# Patient Record
Sex: FEMALE | Race: WHITE | NOT HISPANIC OR LATINO | Employment: OTHER | URBAN - NONMETROPOLITAN AREA
[De-identification: names, ages, dates, MRNs, and addresses within clinical notes are randomized per-mention and may not be internally consistent; named-entity substitution may affect disease eponyms.]

---

## 2018-02-05 ENCOUNTER — DOCUMENTATION ONLY (OUTPATIENT)
Dept: FAMILY MEDICINE | Facility: OTHER | Age: 73
End: 2018-02-05

## 2018-02-05 PROBLEM — J45.909 REACTIVE AIRWAY DISEASE: Status: ACTIVE | Noted: 2018-02-05

## 2018-02-05 PROBLEM — I10 HYPERTENSION: Status: ACTIVE | Noted: 2018-02-05

## 2018-02-05 PROBLEM — N60.19 FIBROCYSTIC BREAST DISEASE: Status: ACTIVE | Noted: 2018-02-05

## 2018-02-05 RX ORDER — LISINOPRIL 40 MG/1
40 TABLET ORAL DAILY
Status: ON HOLD | COMMUNITY
Start: 2015-11-05 | End: 2024-06-25

## 2018-02-05 RX ORDER — ALBUTEROL SULFATE 90 UG/1
2 AEROSOL, METERED RESPIRATORY (INHALATION) 4 TIMES DAILY PRN
COMMUNITY
Start: 2015-01-14 | End: 2024-06-18

## 2018-02-05 RX ORDER — METOPROLOL TARTRATE 50 MG
50 TABLET ORAL 2 TIMES DAILY
COMMUNITY
Start: 2016-02-01 | End: 2024-06-18

## 2018-02-05 RX ORDER — HYDROCHLOROTHIAZIDE 25 MG/1
25 TABLET ORAL DAILY
COMMUNITY
Start: 2015-11-05 | End: 2024-06-18

## 2018-02-05 RX ORDER — SCOLOPAMINE TRANSDERMAL SYSTEM 1 MG/1
PATCH, EXTENDED RELEASE TRANSDERMAL
COMMUNITY
Start: 2016-05-03 | End: 2024-06-18

## 2018-02-05 RX ORDER — BACLOFEN 10 MG/1
10 TABLET ORAL EVERY EVENING
COMMUNITY
Start: 2014-10-29 | End: 2024-07-02

## 2018-03-19 ENCOUNTER — HOSPITAL ENCOUNTER (OUTPATIENT)
Dept: MAMMOGRAPHY | Facility: OTHER | Age: 73
Discharge: HOME | End: 2018-03-19
Attending: FAMILY MEDICINE | Admitting: FAMILY MEDICINE
Payer: MEDICARE

## 2018-03-19 DIAGNOSIS — Z12.31 VISIT FOR SCREENING MAMMOGRAM: ICD-10-CM

## 2018-03-19 PROCEDURE — 77067 SCR MAMMO BI INCL CAD: CPT

## 2023-06-07 ENCOUNTER — TRANSFERRED RECORDS (OUTPATIENT)
Dept: MULTI SPECIALTY CLINIC | Facility: CLINIC | Age: 78
End: 2023-06-07

## 2024-06-18 ENCOUNTER — HOSPITAL ENCOUNTER (INPATIENT)
Facility: OTHER | Age: 79
LOS: 7 days | Discharge: HOME | DRG: 871 | End: 2024-06-25
Attending: STUDENT IN AN ORGANIZED HEALTH CARE EDUCATION/TRAINING PROGRAM | Admitting: INTERNAL MEDICINE
Payer: MEDICARE

## 2024-06-18 ENCOUNTER — APPOINTMENT (OUTPATIENT)
Dept: CARDIOLOGY | Facility: OTHER | Age: 79
DRG: 871 | End: 2024-06-18
Attending: FAMILY MEDICINE
Payer: MEDICARE

## 2024-06-18 ENCOUNTER — APPOINTMENT (OUTPATIENT)
Dept: GENERAL RADIOLOGY | Facility: OTHER | Age: 79
DRG: 871 | End: 2024-06-18
Attending: STUDENT IN AN ORGANIZED HEALTH CARE EDUCATION/TRAINING PROGRAM
Payer: MEDICARE

## 2024-06-18 DIAGNOSIS — A41.9 SEPSIS WITHOUT ACUTE ORGAN DYSFUNCTION, DUE TO UNSPECIFIED ORGANISM (H): ICD-10-CM

## 2024-06-18 DIAGNOSIS — I63.9 ACUTE CVA (CEREBROVASCULAR ACCIDENT) (H): ICD-10-CM

## 2024-06-18 DIAGNOSIS — B34.9 ACUTE VIRAL SYNDROME: ICD-10-CM

## 2024-06-18 DIAGNOSIS — I48.91 ATRIAL FIBRILLATION WITH RAPID VENTRICULAR RESPONSE (H): ICD-10-CM

## 2024-06-18 DIAGNOSIS — N10 ACUTE PYELONEPHRITIS: ICD-10-CM

## 2024-06-18 DIAGNOSIS — Z20.822 LAB TEST NEGATIVE FOR COVID-19 VIRUS: Primary | ICD-10-CM

## 2024-06-18 LAB
ACANTHOCYTES BLD QL SMEAR: ABNORMAL
ALBUMIN SERPL BCG-MCNC: 3.3 G/DL (ref 3.5–5.2)
ALBUMIN SERPL BCG-MCNC: 4 G/DL (ref 3.5–5.2)
ALBUMIN UR-MCNC: 100 MG/DL
ALP SERPL-CCNC: 111 U/L (ref 40–150)
ALP SERPL-CCNC: 147 U/L (ref 40–150)
ALT SERPL W P-5'-P-CCNC: 56 U/L (ref 0–50)
ALT SERPL W P-5'-P-CCNC: 81 U/L (ref 0–50)
ANION GAP SERPL CALCULATED.3IONS-SCNC: 10 MMOL/L (ref 7–15)
ANION GAP SERPL CALCULATED.3IONS-SCNC: 13 MMOL/L (ref 7–15)
APPEARANCE UR: ABNORMAL
AST SERPL W P-5'-P-CCNC: 106 U/L (ref 0–45)
AST SERPL W P-5'-P-CCNC: 66 U/L (ref 0–45)
ATRIAL RATE - MUSE: 174 BPM
ATRIAL RATE - MUSE: 174 BPM
AUER BODIES BLD QL SMEAR: ABNORMAL
B BURGDOR IGG+IGM SER QL: 0.07
BASO STIPL BLD QL SMEAR: ABNORMAL
BASOPHILS # BLD AUTO: 0 10E3/UL (ref 0–0.2)
BASOPHILS NFR BLD AUTO: 1 %
BILIRUB DIRECT SERPL-MCNC: 0.23 MG/DL (ref 0–0.3)
BILIRUB SERPL-MCNC: 0.4 MG/DL
BILIRUB SERPL-MCNC: 0.8 MG/DL
BILIRUB UR QL STRIP: NEGATIVE
BITE CELLS BLD QL SMEAR: ABNORMAL
BLISTER CELLS BLD QL SMEAR: ABNORMAL
BUN SERPL-MCNC: 10.4 MG/DL (ref 8–23)
BUN SERPL-MCNC: 9 MG/DL (ref 8–23)
BURR CELLS BLD QL SMEAR: ABNORMAL
C PNEUM DNA SPEC QL NAA+PROBE: NOT DETECTED
CALCIUM SERPL-MCNC: 8.5 MG/DL (ref 8.8–10.2)
CALCIUM SERPL-MCNC: 9.4 MG/DL (ref 8.8–10.2)
CHLORIDE SERPL-SCNC: 101 MMOL/L (ref 98–107)
CHLORIDE SERPL-SCNC: 90 MMOL/L (ref 98–107)
COLOR UR AUTO: YELLOW
CREAT SERPL-MCNC: 0.55 MG/DL (ref 0.51–0.95)
CREAT SERPL-MCNC: 0.76 MG/DL (ref 0.51–0.95)
DACRYOCYTES BLD QL SMEAR: ABNORMAL
DEPRECATED HCO3 PLAS-SCNC: 24 MMOL/L (ref 22–29)
DEPRECATED HCO3 PLAS-SCNC: 28 MMOL/L (ref 22–29)
DIASTOLIC BLOOD PRESSURE - MUSE: NORMAL MMHG
DIASTOLIC BLOOD PRESSURE - MUSE: NORMAL MMHG
EGFRCR SERPLBLD CKD-EPI 2021: 81 ML/MIN/1.73M2
EGFRCR SERPLBLD CKD-EPI 2021: >90 ML/MIN/1.73M2
ELLIPTOCYTES BLD QL SMEAR: ABNORMAL
EOSINOPHIL # BLD AUTO: 0 10E3/UL (ref 0–0.7)
EOSINOPHIL NFR BLD AUTO: 0 %
ERYTHROCYTE [DISTWIDTH] IN BLOOD BY AUTOMATED COUNT: 11.7 % (ref 10–15)
ERYTHROCYTE [DISTWIDTH] IN BLOOD BY AUTOMATED COUNT: 11.8 % (ref 10–15)
FLUAV H1 2009 PAND RNA SPEC QL NAA+PROBE: NOT DETECTED
FLUAV H1 RNA SPEC QL NAA+PROBE: NOT DETECTED
FLUAV H3 RNA SPEC QL NAA+PROBE: NOT DETECTED
FLUAV RNA SPEC QL NAA+PROBE: NEGATIVE
FLUAV RNA SPEC QL NAA+PROBE: NOT DETECTED
FLUBV RNA RESP QL NAA+PROBE: NEGATIVE
FLUBV RNA SPEC QL NAA+PROBE: NOT DETECTED
FRAGMENTS BLD QL SMEAR: ABNORMAL
GIANT PLATELETS BLD QL SMEAR: ABNORMAL
GLUCOSE BLDC GLUCOMTR-MCNC: 129 MG/DL (ref 70–99)
GLUCOSE SERPL-MCNC: 121 MG/DL (ref 70–99)
GLUCOSE SERPL-MCNC: 163 MG/DL (ref 70–99)
GLUCOSE UR STRIP-MCNC: NEGATIVE MG/DL
HADV DNA SPEC QL NAA+PROBE: NOT DETECTED
HCOV PNL SPEC NAA+PROBE: NOT DETECTED
HCT VFR BLD AUTO: 39.8 % (ref 35–47)
HCT VFR BLD AUTO: 43.8 % (ref 35–47)
HGB BLD-MCNC: 13.9 G/DL (ref 11.7–15.7)
HGB BLD-MCNC: 15.5 G/DL (ref 11.7–15.7)
HGB C CRYSTALS: ABNORMAL
HGB UR QL STRIP: ABNORMAL
HMPV RNA SPEC QL NAA+PROBE: NOT DETECTED
HOLD SPECIMEN: NORMAL
HOWELL-JOLLY BOD BLD QL SMEAR: ABNORMAL
HPIV1 RNA SPEC QL NAA+PROBE: NOT DETECTED
HPIV2 RNA SPEC QL NAA+PROBE: NOT DETECTED
HPIV3 RNA SPEC QL NAA+PROBE: NOT DETECTED
HPIV4 RNA SPEC QL NAA+PROBE: NOT DETECTED
HYALINE CASTS: 42 /LPF
IMM GRANULOCYTES # BLD: 0 10E3/UL
IMM GRANULOCYTES NFR BLD: 0 %
INTERPRETATION ECG - MUSE: NORMAL
INTERPRETATION ECG - MUSE: NORMAL
KETONES UR STRIP-MCNC: 10 MG/DL
LACTATE SERPL-SCNC: 2 MMOL/L (ref 0.7–2)
LEUKOCYTE ESTERASE UR QL STRIP: ABNORMAL
LVEF ECHO: NORMAL
LYMPHOCYTES # BLD AUTO: 1.3 10E3/UL (ref 0.8–5.3)
LYMPHOCYTES NFR BLD AUTO: 28 %
M PNEUMO DNA SPEC QL NAA+PROBE: NOT DETECTED
MAGNESIUM SERPL-MCNC: 1.8 MG/DL (ref 1.7–2.3)
MCH RBC QN AUTO: 31.7 PG (ref 26.5–33)
MCH RBC QN AUTO: 32 PG (ref 26.5–33)
MCHC RBC AUTO-ENTMCNC: 34.9 G/DL (ref 31.5–36.5)
MCHC RBC AUTO-ENTMCNC: 35.4 G/DL (ref 31.5–36.5)
MCV RBC AUTO: 90 FL (ref 78–100)
MCV RBC AUTO: 92 FL (ref 78–100)
MONOCYTES # BLD AUTO: 0.5 10E3/UL (ref 0–1.3)
MONOCYTES NFR BLD AUTO: 11 %
MUCOUS THREADS #/AREA URNS LPF: PRESENT /LPF
NEUTROPHILS # BLD AUTO: 2.9 10E3/UL (ref 1.6–8.3)
NEUTROPHILS NFR BLD AUTO: 60 %
NEUTS HYPERSEG BLD QL SMEAR: ABNORMAL
NITRATE UR QL: NEGATIVE
NRBC # BLD AUTO: 0 10E3/UL
NRBC BLD AUTO-RTO: 0 /100
P AXIS - MUSE: NORMAL DEGREES
P AXIS - MUSE: NORMAL DEGREES
PATH REV: ABNORMAL
PH UR STRIP: 6 [PH] (ref 5–9)
PLAT MORPH BLD: ABNORMAL
PLATELET # BLD AUTO: 70 10E3/UL (ref 150–450)
PLATELET # BLD AUTO: 83 10E3/UL (ref 150–450)
POLYCHROMASIA BLD QL SMEAR: ABNORMAL
POTASSIUM SERPL-SCNC: 2.9 MMOL/L (ref 3.4–5.3)
POTASSIUM SERPL-SCNC: 3 MMOL/L (ref 3.4–5.3)
POTASSIUM SERPL-SCNC: 4.4 MMOL/L (ref 3.4–5.3)
PR INTERVAL - MUSE: NORMAL MS
PR INTERVAL - MUSE: NORMAL MS
PROCALCITONIN SERPL IA-MCNC: 0.52 NG/ML
PROT SERPL-MCNC: 6.2 G/DL (ref 6.4–8.3)
PROT SERPL-MCNC: 7.4 G/DL (ref 6.4–8.3)
QRS DURATION - MUSE: 84 MS
QRS DURATION - MUSE: 84 MS
QT - MUSE: 284 MS
QT - MUSE: 330 MS
QTC - MUSE: 419 MS
QTC - MUSE: 467 MS
R AXIS - MUSE: -12 DEGREES
R AXIS - MUSE: -14 DEGREES
RBC # BLD AUTO: 4.34 10E6/UL (ref 3.8–5.2)
RBC # BLD AUTO: 4.89 10E6/UL (ref 3.8–5.2)
RBC AGGLUT BLD QL: ABNORMAL
RBC MORPH BLD: ABNORMAL
RBC URINE: 9 /HPF
ROULEAUX BLD QL SMEAR: ABNORMAL
RSV RNA SPEC NAA+PROBE: NEGATIVE
RSV RNA SPEC QL NAA+PROBE: NOT DETECTED
RSV RNA SPEC QL NAA+PROBE: NOT DETECTED
RV+EV RNA SPEC QL NAA+PROBE: NOT DETECTED
SARS-COV-2 RNA RESP QL NAA+PROBE: NEGATIVE
SICKLE CELLS BLD QL SMEAR: ABNORMAL
SMUDGE CELLS BLD QL SMEAR: ABNORMAL
SODIUM SERPL-SCNC: 131 MMOL/L (ref 135–145)
SODIUM SERPL-SCNC: 135 MMOL/L (ref 135–145)
SP GR UR STRIP: 1.02 (ref 1–1.03)
SPHEROCYTES BLD QL SMEAR: ABNORMAL
SQUAMOUS EPITHELIAL: 1 /HPF
STOMATOCYTES BLD QL SMEAR: ABNORMAL
SYSTOLIC BLOOD PRESSURE - MUSE: NORMAL MMHG
SYSTOLIC BLOOD PRESSURE - MUSE: NORMAL MMHG
T AXIS - MUSE: -22 DEGREES
T AXIS - MUSE: 14 DEGREES
T4 FREE SERPL-MCNC: 1.24 NG/DL (ref 0.9–1.7)
TARGETS BLD QL SMEAR: ABNORMAL
TOXIC GRANULES BLD QL SMEAR: ABNORMAL
TRANSITIONAL EPI: 2 /HPF
TROPONIN T SERPL HS-MCNC: 23 NG/L
TROPONIN T SERPL HS-MCNC: 24 NG/L
TSH SERPL DL<=0.005 MIU/L-ACNC: 4.5 UIU/ML (ref 0.3–4.2)
UROBILINOGEN UR STRIP-MCNC: NORMAL MG/DL
VARIANT LYMPHS BLD QL SMEAR: PRESENT
VENTRICULAR RATE- MUSE: 163 BPM
VENTRICULAR RATE- MUSE: 97 BPM
WBC # BLD AUTO: 4 10E3/UL (ref 4–11)
WBC # BLD AUTO: 4.8 10E3/UL (ref 4–11)
WBC URINE: 52 /HPF

## 2024-06-18 PROCEDURE — 250N000011 HC RX IP 250 OP 636: Mod: JZ | Performed by: STUDENT IN AN ORGANIZED HEALTH CARE EDUCATION/TRAINING PROGRAM

## 2024-06-18 PROCEDURE — 84145 PROCALCITONIN (PCT): CPT | Performed by: FAMILY MEDICINE

## 2024-06-18 PROCEDURE — 82247 BILIRUBIN TOTAL: CPT | Performed by: FAMILY MEDICINE

## 2024-06-18 PROCEDURE — 80048 BASIC METABOLIC PNL TOTAL CA: CPT | Performed by: STUDENT IN AN ORGANIZED HEALTH CARE EDUCATION/TRAINING PROGRAM

## 2024-06-18 PROCEDURE — 250N000011 HC RX IP 250 OP 636: Mod: JZ | Performed by: FAMILY MEDICINE

## 2024-06-18 PROCEDURE — 258N000003 HC RX IP 258 OP 636: Mod: JZ | Performed by: STUDENT IN AN ORGANIZED HEALTH CARE EDUCATION/TRAINING PROGRAM

## 2024-06-18 PROCEDURE — 99222 1ST HOSP IP/OBS MODERATE 55: CPT | Mod: AI | Performed by: INTERNAL MEDICINE

## 2024-06-18 PROCEDURE — 86665 EPSTEIN-BARR CAPSID VCA: CPT | Performed by: FAMILY MEDICINE

## 2024-06-18 PROCEDURE — 84439 ASSAY OF FREE THYROXINE: CPT | Performed by: STUDENT IN AN ORGANIZED HEALTH CARE EDUCATION/TRAINING PROGRAM

## 2024-06-18 PROCEDURE — 96366 THER/PROPH/DIAG IV INF ADDON: CPT | Performed by: STUDENT IN AN ORGANIZED HEALTH CARE EDUCATION/TRAINING PROGRAM

## 2024-06-18 PROCEDURE — 82374 ASSAY BLOOD CARBON DIOXIDE: CPT | Performed by: FAMILY MEDICINE

## 2024-06-18 PROCEDURE — 99291 CRITICAL CARE FIRST HOUR: CPT | Mod: 25 | Performed by: STUDENT IN AN ORGANIZED HEALTH CARE EDUCATION/TRAINING PROGRAM

## 2024-06-18 PROCEDURE — 93005 ELECTROCARDIOGRAM TRACING: CPT | Mod: 76 | Performed by: STUDENT IN AN ORGANIZED HEALTH CARE EDUCATION/TRAINING PROGRAM

## 2024-06-18 PROCEDURE — 250N000013 HC RX MED GY IP 250 OP 250 PS 637: Performed by: FAMILY MEDICINE

## 2024-06-18 PROCEDURE — 93010 ELECTROCARDIOGRAM REPORT: CPT | Mod: 76 | Performed by: INTERNAL MEDICINE

## 2024-06-18 PROCEDURE — 84484 ASSAY OF TROPONIN QUANT: CPT | Performed by: STUDENT IN AN ORGANIZED HEALTH CARE EDUCATION/TRAINING PROGRAM

## 2024-06-18 PROCEDURE — 86666 EHRLICHIA ANTIBODY: CPT | Performed by: FAMILY MEDICINE

## 2024-06-18 PROCEDURE — 99207 PR NO CHARGE LOS: CPT | Performed by: FAMILY MEDICINE

## 2024-06-18 PROCEDURE — 93306 TTE W/DOPPLER COMPLETE: CPT

## 2024-06-18 PROCEDURE — 87637 SARSCOV2&INF A&B&RSV AMP PRB: CPT | Performed by: STUDENT IN AN ORGANIZED HEALTH CARE EDUCATION/TRAINING PROGRAM

## 2024-06-18 PROCEDURE — 36415 COLL VENOUS BLD VENIPUNCTURE: CPT | Performed by: FAMILY MEDICINE

## 2024-06-18 PROCEDURE — 86618 LYME DISEASE ANTIBODY: CPT | Performed by: STUDENT IN AN ORGANIZED HEALTH CARE EDUCATION/TRAINING PROGRAM

## 2024-06-18 PROCEDURE — 96376 TX/PRO/DX INJ SAME DRUG ADON: CPT | Mod: XS | Performed by: STUDENT IN AN ORGANIZED HEALTH CARE EDUCATION/TRAINING PROGRAM

## 2024-06-18 PROCEDURE — 87040 BLOOD CULTURE FOR BACTERIA: CPT | Performed by: FAMILY MEDICINE

## 2024-06-18 PROCEDURE — 36416 COLLJ CAPILLARY BLOOD SPEC: CPT | Performed by: FAMILY MEDICINE

## 2024-06-18 PROCEDURE — 85025 COMPLETE CBC W/AUTO DIFF WBC: CPT | Performed by: STUDENT IN AN ORGANIZED HEALTH CARE EDUCATION/TRAINING PROGRAM

## 2024-06-18 PROCEDURE — 93005 ELECTROCARDIOGRAM TRACING: CPT | Performed by: STUDENT IN AN ORGANIZED HEALTH CARE EDUCATION/TRAINING PROGRAM

## 2024-06-18 PROCEDURE — 99291 CRITICAL CARE FIRST HOUR: CPT | Performed by: STUDENT IN AN ORGANIZED HEALTH CARE EDUCATION/TRAINING PROGRAM

## 2024-06-18 PROCEDURE — 87086 URINE CULTURE/COLONY COUNT: CPT | Performed by: STUDENT IN AN ORGANIZED HEALTH CARE EDUCATION/TRAINING PROGRAM

## 2024-06-18 PROCEDURE — 84132 ASSAY OF SERUM POTASSIUM: CPT | Performed by: FAMILY MEDICINE

## 2024-06-18 PROCEDURE — 36415 COLL VENOUS BLD VENIPUNCTURE: CPT | Performed by: STUDENT IN AN ORGANIZED HEALTH CARE EDUCATION/TRAINING PROGRAM

## 2024-06-18 PROCEDURE — 87040 BLOOD CULTURE FOR BACTERIA: CPT | Performed by: STUDENT IN AN ORGANIZED HEALTH CARE EDUCATION/TRAINING PROGRAM

## 2024-06-18 PROCEDURE — 82248 BILIRUBIN DIRECT: CPT | Performed by: STUDENT IN AN ORGANIZED HEALTH CARE EDUCATION/TRAINING PROGRAM

## 2024-06-18 PROCEDURE — 96375 TX/PRO/DX INJ NEW DRUG ADDON: CPT | Mod: XS | Performed by: STUDENT IN AN ORGANIZED HEALTH CARE EDUCATION/TRAINING PROGRAM

## 2024-06-18 PROCEDURE — 200N000001 HC R&B ICU

## 2024-06-18 PROCEDURE — 84443 ASSAY THYROID STIM HORMONE: CPT | Performed by: STUDENT IN AN ORGANIZED HEALTH CARE EDUCATION/TRAINING PROGRAM

## 2024-06-18 PROCEDURE — 71045 X-RAY EXAM CHEST 1 VIEW: CPT | Mod: TC

## 2024-06-18 PROCEDURE — 86645 CMV ANTIBODY IGM: CPT | Performed by: FAMILY MEDICINE

## 2024-06-18 PROCEDURE — 36600 WITHDRAWAL OF ARTERIAL BLOOD: CPT | Performed by: FAMILY MEDICINE

## 2024-06-18 PROCEDURE — 93306 TTE W/DOPPLER COMPLETE: CPT | Mod: 26 | Performed by: STUDENT IN AN ORGANIZED HEALTH CARE EDUCATION/TRAINING PROGRAM

## 2024-06-18 PROCEDURE — 83735 ASSAY OF MAGNESIUM: CPT | Performed by: STUDENT IN AN ORGANIZED HEALTH CARE EDUCATION/TRAINING PROGRAM

## 2024-06-18 PROCEDURE — 85027 COMPLETE CBC AUTOMATED: CPT | Performed by: FAMILY MEDICINE

## 2024-06-18 PROCEDURE — 96365 THER/PROPH/DIAG IV INF INIT: CPT | Performed by: STUDENT IN AN ORGANIZED HEALTH CARE EDUCATION/TRAINING PROGRAM

## 2024-06-18 PROCEDURE — 87899 AGENT NOS ASSAY W/OPTIC: CPT | Performed by: FAMILY MEDICINE

## 2024-06-18 PROCEDURE — 96367 TX/PROPH/DG ADDL SEQ IV INF: CPT | Mod: XS | Performed by: STUDENT IN AN ORGANIZED HEALTH CARE EDUCATION/TRAINING PROGRAM

## 2024-06-18 PROCEDURE — 87633 RESP VIRUS 12-25 TARGETS: CPT | Performed by: FAMILY MEDICINE

## 2024-06-18 PROCEDURE — 250N000013 HC RX MED GY IP 250 OP 250 PS 637: Performed by: INTERNAL MEDICINE

## 2024-06-18 PROCEDURE — 999N000157 HC STATISTIC RCP TIME EA 10 MIN

## 2024-06-18 PROCEDURE — 81001 URINALYSIS AUTO W/SCOPE: CPT | Performed by: STUDENT IN AN ORGANIZED HEALTH CARE EDUCATION/TRAINING PROGRAM

## 2024-06-18 PROCEDURE — 83605 ASSAY OF LACTIC ACID: CPT | Performed by: STUDENT IN AN ORGANIZED HEALTH CARE EDUCATION/TRAINING PROGRAM

## 2024-06-18 RX ORDER — MAGNESIUM SULFATE HEPTAHYDRATE 40 MG/ML
2 INJECTION, SOLUTION INTRAVENOUS ONCE
Status: COMPLETED | OUTPATIENT
Start: 2024-06-18 | End: 2024-06-18

## 2024-06-18 RX ORDER — CEFTRIAXONE 2 G/1
2 INJECTION, POWDER, FOR SOLUTION INTRAMUSCULAR; INTRAVENOUS EVERY 24 HOURS
Status: DISCONTINUED | OUTPATIENT
Start: 2024-06-18 | End: 2024-06-22

## 2024-06-18 RX ORDER — ONDANSETRON 2 MG/ML
4 INJECTION INTRAMUSCULAR; INTRAVENOUS EVERY 6 HOURS PRN
Status: DISCONTINUED | OUTPATIENT
Start: 2024-06-18 | End: 2024-06-25 | Stop reason: HOSPADM

## 2024-06-18 RX ORDER — BACLOFEN 10 MG/1
10 TABLET ORAL EVERY EVENING
Status: DISCONTINUED | OUTPATIENT
Start: 2024-06-18 | End: 2024-06-25 | Stop reason: HOSPADM

## 2024-06-18 RX ORDER — MONTELUKAST SODIUM 5 MG/1
10 TABLET, CHEWABLE ORAL AT BEDTIME
Status: DISCONTINUED | OUTPATIENT
Start: 2024-06-18 | End: 2024-06-25 | Stop reason: HOSPADM

## 2024-06-18 RX ORDER — AMLODIPINE BESYLATE 2.5 MG/1
2.5 TABLET ORAL DAILY
COMMUNITY
End: 2024-06-22

## 2024-06-18 RX ORDER — POTASSIUM CHLORIDE 7.45 MG/ML
10 INJECTION INTRAVENOUS ONCE
Status: COMPLETED | OUTPATIENT
Start: 2024-06-18 | End: 2024-06-18

## 2024-06-18 RX ORDER — POTASSIUM CHLORIDE 1500 MG/1
40 TABLET, EXTENDED RELEASE ORAL ONCE
Status: COMPLETED | OUTPATIENT
Start: 2024-06-18 | End: 2024-06-18

## 2024-06-18 RX ORDER — CALCIUM GLUCONATE 94 MG/ML
1 INJECTION, SOLUTION INTRAVENOUS ONCE
Status: COMPLETED | OUTPATIENT
Start: 2024-06-18 | End: 2024-06-18

## 2024-06-18 RX ORDER — ACETAMINOPHEN 325 MG/1
650 TABLET ORAL EVERY 4 HOURS PRN
Status: DISCONTINUED | OUTPATIENT
Start: 2024-06-18 | End: 2024-06-25 | Stop reason: HOSPADM

## 2024-06-18 RX ORDER — KETOROLAC TROMETHAMINE 15 MG/ML
15 INJECTION, SOLUTION INTRAMUSCULAR; INTRAVENOUS ONCE
Status: COMPLETED | OUTPATIENT
Start: 2024-06-18 | End: 2024-06-18

## 2024-06-18 RX ORDER — MONTELUKAST SODIUM 10 MG/1
10 TABLET ORAL AT BEDTIME
COMMUNITY
End: 2024-07-02

## 2024-06-18 RX ORDER — METOPROLOL TARTRATE 50 MG
50 TABLET ORAL 2 TIMES DAILY
Status: DISCONTINUED | OUTPATIENT
Start: 2024-06-18 | End: 2024-06-25 | Stop reason: HOSPADM

## 2024-06-18 RX ORDER — POTASSIUM CHLORIDE 1500 MG/1
20 TABLET, EXTENDED RELEASE ORAL ONCE
Status: COMPLETED | OUTPATIENT
Start: 2024-06-18 | End: 2024-06-18

## 2024-06-18 RX ORDER — ACETAMINOPHEN 500 MG
1000 TABLET ORAL ONCE
Status: DISCONTINUED | OUTPATIENT
Start: 2024-06-18 | End: 2024-06-19

## 2024-06-18 RX ORDER — NICOTINE POLACRILEX 4 MG
15-30 LOZENGE BUCCAL
Status: DISCONTINUED | OUTPATIENT
Start: 2024-06-18 | End: 2024-06-20

## 2024-06-18 RX ORDER — DOXYCYCLINE 100 MG/10ML
100 INJECTION, POWDER, LYOPHILIZED, FOR SOLUTION INTRAVENOUS EVERY 12 HOURS
Status: DISCONTINUED | OUTPATIENT
Start: 2024-06-18 | End: 2024-06-19

## 2024-06-18 RX ORDER — AMLODIPINE BESYLATE 2.5 MG/1
2.5 TABLET ORAL DAILY
Status: DISCONTINUED | OUTPATIENT
Start: 2024-06-18 | End: 2024-06-18

## 2024-06-18 RX ORDER — ONDANSETRON 2 MG/ML
4 INJECTION INTRAMUSCULAR; INTRAVENOUS ONCE
Status: COMPLETED | OUTPATIENT
Start: 2024-06-18 | End: 2024-06-18

## 2024-06-18 RX ORDER — ONDANSETRON 4 MG/1
4 TABLET, ORALLY DISINTEGRATING ORAL EVERY 6 HOURS PRN
Status: DISCONTINUED | OUTPATIENT
Start: 2024-06-18 | End: 2024-06-25 | Stop reason: HOSPADM

## 2024-06-18 RX ORDER — DEXTROSE MONOHYDRATE 25 G/50ML
25-50 INJECTION, SOLUTION INTRAVENOUS
Status: DISCONTINUED | OUTPATIENT
Start: 2024-06-18 | End: 2024-06-20

## 2024-06-18 RX ORDER — DILTIAZEM HYDROCHLORIDE 5 MG/ML
10 INJECTION INTRAVENOUS ONCE
Status: COMPLETED | OUTPATIENT
Start: 2024-06-18 | End: 2024-06-18

## 2024-06-18 RX ORDER — PRAVASTATIN SODIUM 10 MG
20 TABLET ORAL AT BEDTIME
Status: DISCONTINUED | OUTPATIENT
Start: 2024-06-18 | End: 2024-06-25 | Stop reason: HOSPADM

## 2024-06-18 RX ORDER — CEFTRIAXONE 1 G/1
1 INJECTION, POWDER, FOR SOLUTION INTRAMUSCULAR; INTRAVENOUS EVERY 24 HOURS
Status: DISCONTINUED | OUTPATIENT
Start: 2024-06-18 | End: 2024-06-18

## 2024-06-18 RX ORDER — DILTIAZEM HCL/D5W 125 MG/125
5-15 PLASTIC BAG, INJECTION (ML) INTRAVENOUS CONTINUOUS
Status: DISCONTINUED | OUTPATIENT
Start: 2024-06-18 | End: 2024-06-19

## 2024-06-18 RX ORDER — SODIUM CHLORIDE 9 MG/ML
INJECTION, SOLUTION INTRAVENOUS CONTINUOUS
Status: DISCONTINUED | OUTPATIENT
Start: 2024-06-18 | End: 2024-06-19

## 2024-06-18 RX ORDER — CEFTRIAXONE 1 G/1
1 INJECTION, POWDER, FOR SOLUTION INTRAMUSCULAR; INTRAVENOUS ONCE
Status: COMPLETED | OUTPATIENT
Start: 2024-06-18 | End: 2024-06-18

## 2024-06-18 RX ORDER — PRAVASTATIN SODIUM 20 MG
20 TABLET ORAL DAILY
COMMUNITY
End: 2024-07-02

## 2024-06-18 RX ORDER — FLUTICASONE PROPIONATE 50 MCG
1 SPRAY, SUSPENSION (ML) NASAL DAILY
COMMUNITY

## 2024-06-18 RX ADMIN — ACETAMINOPHEN 650 MG: 325 TABLET, FILM COATED ORAL at 19:56

## 2024-06-18 RX ADMIN — DILTIAZEM HYDROCHLORIDE 10 MG: 5 INJECTION INTRAVENOUS at 01:06

## 2024-06-18 RX ADMIN — ONDANSETRON 4 MG: 2 INJECTION INTRAMUSCULAR; INTRAVENOUS at 01:25

## 2024-06-18 RX ADMIN — PRAVASTATIN SODIUM 20 MG: 10 TABLET ORAL at 22:28

## 2024-06-18 RX ADMIN — AMIODARONE HYDROCHLORIDE 0.5 MG/MIN: 1.8 INJECTION, SOLUTION INTRAVENOUS at 19:56

## 2024-06-18 RX ADMIN — CEFTRIAXONE 2 G: 2 INJECTION, POWDER, FOR SOLUTION INTRAMUSCULAR; INTRAVENOUS at 20:44

## 2024-06-18 RX ADMIN — AMIODARONE HYDROCHLORIDE 1 MG/MIN: 1.8 INJECTION, SOLUTION INTRAVENOUS at 03:01

## 2024-06-18 RX ADMIN — DOXYCYCLINE 100 MG: 100 INJECTION, POWDER, LYOPHILIZED, FOR SOLUTION INTRAVENOUS at 09:18

## 2024-06-18 RX ADMIN — Medication 5 MG/HR: at 01:40

## 2024-06-18 RX ADMIN — CEFTRIAXONE SODIUM 1 G: 1 INJECTION, POWDER, FOR SOLUTION INTRAMUSCULAR; INTRAVENOUS at 03:50

## 2024-06-18 RX ADMIN — POTASSIUM CHLORIDE 10 MEQ: 7.46 INJECTION, SOLUTION INTRAVENOUS at 01:27

## 2024-06-18 RX ADMIN — CALCIUM GLUCONATE 1 G: 98 INJECTION, SOLUTION INTRAVENOUS at 01:17

## 2024-06-18 RX ADMIN — AMIODARONE HYDROCHLORIDE 150 MG: 1.5 INJECTION, SOLUTION INTRAVENOUS at 02:41

## 2024-06-18 RX ADMIN — MAGNESIUM SULFATE HEPTAHYDRATE 2 G: 40 INJECTION, SOLUTION INTRAVENOUS at 01:14

## 2024-06-18 RX ADMIN — METOPROLOL TARTRATE 50 MG: 50 TABLET, FILM COATED ORAL at 21:26

## 2024-06-18 RX ADMIN — SODIUM CHLORIDE 1000 ML: 9 INJECTION, SOLUTION INTRAVENOUS at 01:24

## 2024-06-18 RX ADMIN — DOXYCYCLINE 100 MG: 100 INJECTION, POWDER, LYOPHILIZED, FOR SOLUTION INTRAVENOUS at 21:26

## 2024-06-18 RX ADMIN — AMIODARONE HYDROCHLORIDE 0.5 MG/MIN: 1.8 INJECTION, SOLUTION INTRAVENOUS at 08:56

## 2024-06-18 RX ADMIN — POTASSIUM CHLORIDE 20 MEQ: 1500 TABLET, EXTENDED RELEASE ORAL at 12:31

## 2024-06-18 RX ADMIN — BACLOFEN 10 MG: 10 TABLET ORAL at 20:46

## 2024-06-18 RX ADMIN — KETOROLAC TROMETHAMINE 15 MG: 15 INJECTION, SOLUTION INTRAMUSCULAR; INTRAVENOUS at 01:48

## 2024-06-18 RX ADMIN — POTASSIUM CHLORIDE 40 MEQ: 1500 TABLET, EXTENDED RELEASE ORAL at 09:52

## 2024-06-18 RX ADMIN — SODIUM CHLORIDE: 900 INJECTION INTRAVENOUS at 02:09

## 2024-06-18 RX ADMIN — Medication 5 MG/HR: at 11:05

## 2024-06-18 RX ADMIN — MONTELUKAST SODIUM 10 MG: 5 TABLET, CHEWABLE ORAL at 21:26

## 2024-06-18 RX ADMIN — METOPROLOL TARTRATE 50 MG: 50 TABLET, FILM COATED ORAL at 12:31

## 2024-06-18 RX ADMIN — SODIUM CHLORIDE: 900 INJECTION INTRAVENOUS at 11:29

## 2024-06-18 RX ADMIN — ACETAMINOPHEN 650 MG: 325 TABLET, FILM COATED ORAL at 10:30

## 2024-06-18 ASSESSMENT — ACTIVITIES OF DAILY LIVING (ADL)
ADLS_ACUITY_SCORE: 33
ADLS_ACUITY_SCORE: 35
DIFFICULTY_COMMUNICATING: NO
ADLS_ACUITY_SCORE: 29
HEARING_DIFFICULTY_OR_DEAF: NO
ADLS_ACUITY_SCORE: 29
DRESSING/BATHING_DIFFICULTY: NO
ADLS_ACUITY_SCORE: 20
ADLS_ACUITY_SCORE: 22
FALL_HISTORY_WITHIN_LAST_SIX_MONTHS: NO
CHANGE_IN_FUNCTIONAL_STATUS_SINCE_ONSET_OF_CURRENT_ILLNESS/INJURY: NO
ADLS_ACUITY_SCORE: 29
ADLS_ACUITY_SCORE: 33
ADLS_ACUITY_SCORE: 20
ADLS_ACUITY_SCORE: 20
ADLS_ACUITY_SCORE: 35
CONCENTRATING,_REMEMBERING_OR_MAKING_DECISIONS_DIFFICULTY: NO
ADLS_ACUITY_SCORE: 35
ADLS_ACUITY_SCORE: 20
ADLS_ACUITY_SCORE: 22
ADLS_ACUITY_SCORE: 35
ADLS_ACUITY_SCORE: 29
TOILETING_ISSUES: NO
ADLS_ACUITY_SCORE: 35
DOING_ERRANDS_INDEPENDENTLY_DIFFICULTY: NO
ADLS_ACUITY_SCORE: 33
ADLS_ACUITY_SCORE: 35
WALKING_OR_CLIMBING_STAIRS_DIFFICULTY: NO
ADLS_ACUITY_SCORE: 33
ADLS_ACUITY_SCORE: 29
ADLS_ACUITY_SCORE: 22
WEAR_GLASSES_OR_BLIND: NO
ADLS_ACUITY_SCORE: 22
DIFFICULTY_EATING/SWALLOWING: NO

## 2024-06-18 ASSESSMENT — COLUMBIA-SUICIDE SEVERITY RATING SCALE - C-SSRS
2. HAVE YOU ACTUALLY HAD ANY THOUGHTS OF KILLING YOURSELF IN THE PAST MONTH?: NO
1. IN THE PAST MONTH, HAVE YOU WISHED YOU WERE DEAD OR WISHED YOU COULD GO TO SLEEP AND NOT WAKE UP?: NO
6. HAVE YOU EVER DONE ANYTHING, STARTED TO DO ANYTHING, OR PREPARED TO DO ANYTHING TO END YOUR LIFE?: NO

## 2024-06-18 NOTE — ED NOTES
Heart rate 110's to 120's with the Cardizem and Amiodarone.     Assisted pt to the bedside commode.   Heart rate jumped to the 150's-160's.   Pt was SOB with the activity.   Heart rate recovered a short time after getting back into bed.  Analia Gardner RN on 6/18/2024 at 4:02 AM

## 2024-06-18 NOTE — PHARMACY-ADMISSION MEDICATION HISTORY
Pharmacist Admission Medication History    Admission medication history is complete. The information provided in this note is only as accurate as the sources available at the time of the update.    Information Source(s): Patient and CareEverywhere/SureScripts via in-person    Pertinent Information: Patient was good historian of medications. Able to speak on medications without prompting. Patient attests to good adherence.  Patient has had poor side effects with albuterol (extreme headache), Scopolamine patch (extreme dry mouth). Patient attests to using an un-identified OTC eyedrop. Patient does not always take baclofen as Rxed. Sometimes will do additional 5mg during the day for pain in neck and back.     Changes made to PTA medication list:  Added: singulair, Flonase, pravastatin, amlodipine   Deleted: Scopolamine patch, hydrochlorothiazide, lopressor, albuterol,   Changed: Baclofen sig TID -> QPM     Allergies reviewed with patient: yes    Medication History Completed By: Adonis Argueta [facility] 6/18/2024 11:08 AM    PTA Med List   Medication Sig Last Dose    amLODIPine (NORVASC) 2.5 MG tablet Take 2.5 mg by mouth daily 6/17/2024 at PM    baclofen (LIORESAL) 10 MG tablet Take 10 mg by mouth every evening 6/17/2024 at PM    fluticasone (FLONASE) 50 MCG/ACT nasal spray Spray 1 spray into both nostrils daily 6/17/2024 at AM    lisinopril (PRINIVIL/ZESTRIL) 40 MG tablet Take 40 mg by mouth daily 6/17/2024 at PM    montelukast (SINGULAIR) 10 MG tablet Take 10 mg by mouth at bedtime 6/17/2024 at PM    pravastatin (PRAVACHOL) 20 MG tablet Take 20 mg by mouth daily 6/17/2024 at PM

## 2024-06-18 NOTE — H&P
[facility]    History and Physical - Hospitalist Service       Date of Admission:  6/18/2024    Assessment & Plan    Sepsis  Viral syndrome  -blood and urine cultures pending  -CXR: clear  -COVID/flu/RSV negative  -lactic acid wnl  -continue IVF and symptomatic treatment    UTI  -ceftriaxone IV daily  -urine culture pending    Afib with RVR  -on amio drip currently, off diltiazem; now rate controlled  -new onset  -CHADS-VASC 4; would need anticoagulation however given thrombocytopenia, will hold off for now    Hyponatremia  -cont IVF, repeat BMP    Hypokalemia  -replacement protocol    Transaminitis  thrombocytopenia  -may be due to underlying viral illness  -CMP and CBC in AM    Prediabetes    HTN  amlodipine     Diet:  regular  DVT Prophylaxis: scds  Tran Catheter: Not present  Lines: None     Code Status:  FULL    Clinically Significant Risk Factors Present on Admission        # Hypokalemia: Lowest K = 3 mmol/L in last 2 days, will replace as needed         # Thrombocytopenia: Lowest platelets = 83 in last 2 days, will monitor for bleeding   # Hypertension: Noted on problem list                 # Asthma: noted on problem list        Disposition Plan     The patient's care was discussed with the Patient.        ISABELA PENDLETON MD  [facility]  Securely message with the Vocera Web Console (learn more here)  Text page via [facility] Paging/Directory      Visit/Communication Style   Virtual (Video) communication was used to evaluate Sherri.  Sherri consented to the use of video communication: yes  Video START time: 0625 , 6/18/2024  Video STOP time: 0635, 6/18/2024   Patient's location: [facility]   Provider's location during the visit: [facility] Tele-medicine site        ______________________________________________________________________    Chief Complaint   Fever    History of Present Illness   78yoF with HTN, HLD, prediabetes, and asthma presented  to the ED with seven days of dry cough, fever, malaise, body aches, anorexia, and nausea.  Her  had a short bout of diarrhea but no other symptoms.  She otherwise denies sick contacts.  She denies a history of afib.     Review of Systems    General: negative for , , sweats,   Eyes: negative for blurred vision, loss of vision  Ear Nose and Throat: negative for pharyngitis, speech or swallowing difficulties  Respiratory:  negative for sputum production, wheezing, FAYE, pleuritic pain, sob or   Cardiology:  negative for chest pain, palpitations, orthopnea, PND, edema, syncope   Gastrointestinal: negative for abdominal pain, , vomiting, diarrhea, constipation, hematemesis, melena or hematochezia  Genitourinary: negative for frequency, urgency, dysuria, hematuria   Neurological: negative for focal weakness, paresthesia    Past Medical History    I have reviewed this patient's medical history and updated it with pertinent information if needed.   Past Medical History:   Diagnosis Date    Hormone replacement therapy (postmenopausal)     for surgical menopause       Past Surgical History   I have reviewed this patient's surgical history and updated it with pertinent information if needed.  Past Surgical History:   Procedure Laterality Date    BIOPSY BREAST       2002, left breast    EXTRACAPSULAR CATARACT EXTRATION WITH INTRAOCULAR LENS IMPLANT      No Comments Provided    HYSTERECTOMY TOTAL ABDOMINAL, BILATERAL SALPINGO-OOPHORECTOMY, COMBINED      because of endometriosis    RELEASE CARPAL TUNNEL      No Comments Provided       Social History   I have reviewed this patient's social history and updated it with pertinent information if needed.  Social History     Tobacco Use    Smoking status: Never    Smokeless tobacco: Never   Substance Use Topics    Alcohol use: Yes     Comment: Alcoholic Drinks/day: social    Drug use: Unknown     Types: Other     Comment: Drug use: No       Family History   I have reviewed this  patient's family history and updated it with pertinent information if needed.  Family History   Problem Relation Age of Onset    Other - See Comments Father         Stroke    Heart Disease Father         Heart Disease    Unknown/Adopted Mother         Unknown    Other - See Comments Other         No history of female cancers, thyroid cancer, diabetes, no significant early heart disease    Breast Cancer No family hx of         Cancer-breast       Prior to Admission Medications   Prior to Admission Medications   Prescriptions Last Dose Informant Patient Reported? Taking?   albuterol (PROAIR HFA/PROVENTIL HFA/VENTOLIN HFA) 108 (90 BASE) MCG/ACT Inhaler   Yes No   Sig: Inhale 2 puffs into the lungs 4 times daily as needed   baclofen (LIORESAL) 10 MG tablet   Yes No   Sig: Take 10 mg by mouth 3 times daily   hydrochlorothiazide (HYDRODIURIL) 25 MG tablet   Yes No   Sig: Take 25 mg by mouth daily   lisinopril (PRINIVIL/ZESTRIL) 40 MG tablet   Yes No   Sig: Take 40 mg by mouth daily   metoprolol tartrate (LOPRESSOR) 50 MG tablet   Yes No   Sig: Take 50 mg by mouth 2 times daily   order for DME   Yes No   Sig: Neoprene glove for right hand for DJD and Raynaud's, custom.   scopolamine (TRANSDERM) 72 hr patch   Yes No   Sig: Apply 1 Patch on dry, clean, hairless skin every 72 hours.      Facility-Administered Medications: None     Allergies   Allergies   Allergen Reactions    Amoxicillin-Pot Clavulanate Diarrhea and GI Disturbance    Aspirin      Other reaction(s): Bronchospasm  Tolerates 81 mg aspirin    Milk (Cow) GI Disturbance       Physical Exam   Vital Signs: Temp: 100.4  F (38  C) Temp src: Tympanic BP: 118/84 Pulse: 92   Resp: 23 SpO2: 91 % O2 Device: None (Room air)    Weight: 125 lbs 0 oz    Gen:  ill-appearing; lying semi-supine in hospital stretcher  HEENT:  Anicteric sclera, PER, hearing intact to voice  Resp:  No accessory muscle use, breath sounds clear; no wheezes no rales no rhonchi  Card:  irreg irreg  Abd:   Soft per RN exam, no TTP, non-distended, normoactive bowel sounds are present  Musc:  Normal strength and movement of the major muscle groups without obvious deformity  Psych:  Good insight, oriented to person, place and time, not anxious, not agitated    Data     Recent Labs   Lab 06/18/24  0031   WBC 4.8   HGB 15.5   MCV 90   PLT 83*   *   POTASSIUM 3.0*   CHLORIDE 90*   CO2 28   BUN 10.4   CR 0.76   ANIONGAP 13   NANI 9.4   *   ALBUMIN 4.0   PROTTOTAL 7.4   BILITOTAL 0.8   ALKPHOS 147   ALT 81*   *         Recent Results (from the past 24 hour(s))   XR Chest Port 1 View    Narrative    PROCEDURE INFORMATION:   Exam: XR Chest   Exam date and time: 6/18/2024 1:40 AM   Age: 78 years old   Clinical indication: Cough and fever; Additional info: Cough fever     TECHNIQUE:   Imaging protocol: Radiologic exam of the chest.   Views: 1 view.     COMPARISON:   No relevant prior studies available.     FINDINGS:   Lungs: Unremarkable. No consolidation.   Pleural spaces: No evidence of pleural effusion. No pneumothorax.   Heart/Mediastinum: The cardiac silhouette is enlarged. Cardiac monitor leads   overlie the thorax.   Vasculature: There is atherosclerotic calcification of the aortic arch.   Bones/joints: There are degenerative changes of the spine.       Impression    IMPRESSION:   Cardiomegaly. Otherwise, no radiographic evidence seen for an acute   cardiopulmonary process.     THIS DOCUMENT HAS BEEN ELECTRONICALLY SIGNED BY TARA IZQUIERDO MD

## 2024-06-18 NOTE — PROGRESS NOTES
"[facility] [place] [facility] ADMISSION NOTE    Patient admitted to room 916 at approximately 0600 via cart from emergency room. Patient was accompanied by nurse.     No change in nursing care team with transfer    Patient trasferred to bed via assist of 3. Patient alert and oriented X 4. The patient is not having any pain.  . Admission vital signs: Blood pressure 137/85, pulse 108, temperature 98.6  F (37  C), temperature source Tympanic, resp. rate 20, height 1.6 m (5' 3\"), weight 56.7 kg (125 lb), SpO2 98%. Patient was oriented to plan of care, call light, bed controls, tv, telephone, bathroom, and visiting hours.     Risk Assessment    The following safety risks were identified during admission: fall and skin. Yellow risk band applied: YES.     Skin Initial Assessment    This writer admitted this patient and completed a full skin assessment and Nuno score in the Adult PCS flowsheet.   Photo documentation of skin problem and/or wound competed via Mob[employer] application (located under Media):  No    Appropriate interventions initiated as needed.       Nuno Risk Assessment  Sensory Perception: 4-->no impairment  Moisture: 3-->occasionally moist  Activity: 2-->chairfast  Mobility: 3-->slightly limited  Nutrition: 2-->probably inadequate  Friction and Shear: 2-->potential problem  Nuno Score: 16  Moisture Interventions: Encourage regular toileting, No brief in bed  Nutrition Interventions: Encourage protein intake, Maintain adequate hydration  Friction/Shear Interventions: HOB 30 degrees or less  Sensory/Activity/Mobility Interventions: Encourage activity/OOB  Mattress: Standard gel/foam mattress (IsoFlex, Atmos Air, etc.)  Bed Frame: Standard width and length    Education    Patient has a [place] to Observation order: No  Observation education completed and documented: N/A      Analia Gardner RN      "

## 2024-06-18 NOTE — PROGRESS NOTES
Interdisciplinary Discharge Planning Note    Anticipated Discharge Date: 6/20-6/21    Anticipated Discharge Location: Home    Clinical Needs Before Discharge:  stable cardiac status (angina, heart failure, arrhythmia), fluids, culture results    Treatment Needs After Discharge:  None identified    Potential Barriers to Discharge: None identified     NIKOLE Sommer  6/18/2024,  12:53 PM

## 2024-06-18 NOTE — ED TRIAGE NOTES
Pt arrives to ED with c/o not feeling well. Pt has body aches, dry cough, fatigue. Pt has fever in triage. Pt has some nausea as well.    Sue Mcrae RN on 6/18/2024 at 12:18 AM       Triage Assessment (Adult)       Row Name 06/18/24 0017          Respiratory WDL    Respiratory WDL X;cough     Cough Frequency infrequent     Cough Type dry        Skin Circulation/Temperature WDL    Skin Circulation/Temperature WDL X  hot        Cognitive/Neuro/Behavioral WDL    Cognitive/Neuro/Behavioral WDL WDL

## 2024-06-18 NOTE — ED PROVIDER NOTES
History     Chief Complaint   Patient presents with    Flu Symptoms    Irregular Heart Beat       Sherri Bennett is a 78 year old female who presents for viral type symptoms.  Onset 4 days ago.  Symptoms include nonproductive cough, body aches, fatigue, nausea, anorexia, body aches.   did have 1 day of diarrhea but patient has not had this.  Denies known history of atrial fibrillation.  Denies sense of heart racing or palpitations currently.  She has had some lower intermittent lower chest pain. No known tick exposures.    Allergies   Allergen Reactions    Amoxicillin-Pot Clavulanate Diarrhea and GI Disturbance    Aspirin      Other reaction(s): Bronchospasm  Tolerates 81 mg aspirin    Milk (Cow) GI Disturbance       Patient Active Problem List    Diagnosis Date Noted    Atrial fibrillation with rapid ventricular response (H) 06/18/2024     Priority: Medium    Acute viral syndrome 06/18/2024     Priority: Medium    Sepsis without acute organ dysfunction, due to unspecified organism (H) 06/18/2024     Priority: Medium    Fibrocystic breast disease 02/05/2018     Priority: Medium    Hypertension 02/05/2018     Priority: Medium    Reactive airway disease 02/05/2018     Priority: Medium    Backache 09/05/2013     Priority: Medium    CMC arthritis, thumb, degenerative 12/19/2012     Priority: Medium    Raynaud's syndrome 12/19/2012     Priority: Medium    Arthritis of hand 05/24/2011     Priority: Medium       Past Medical History:   Diagnosis Date    Hormone replacement therapy (postmenopausal)        Past Surgical History:   Procedure Laterality Date    BIOPSY BREAST       2002, left breast    EXTRACAPSULAR CATARACT EXTRATION WITH INTRAOCULAR LENS IMPLANT      No Comments Provided    HYSTERECTOMY TOTAL ABDOMINAL, BILATERAL SALPINGO-OOPHORECTOMY, COMBINED      because of endometriosis    RELEASE CARPAL TUNNEL      No Comments Provided       Family History   Problem Relation Age of Onset    Other - See Comments  "Father         Stroke    Heart Disease Father         Heart Disease    Unknown/Adopted Mother         Unknown    Other - See Comments Other         No history of female cancers, thyroid cancer, diabetes, no significant early heart disease    Breast Cancer No family hx of         Cancer-breast       Social History     Tobacco Use    Smoking status: Never    Smokeless tobacco: Never   Substance Use Topics    Alcohol use: Yes     Comment: Alcoholic Drinks/day: social    Drug use: Unknown     Types: Other     Comment: Drug use: No       Medications:    albuterol (PROAIR HFA/PROVENTIL HFA/VENTOLIN HFA) 108 (90 BASE) MCG/ACT Inhaler  baclofen (LIORESAL) 10 MG tablet  hydrochlorothiazide (HYDRODIURIL) 25 MG tablet  lisinopril (PRINIVIL/ZESTRIL) 40 MG tablet  metoprolol tartrate (LOPRESSOR) 50 MG tablet  order for DME  scopolamine (TRANSDERM) 72 hr patch        Review of Systems: See HPI for pertinent negatives and positives. All other systems reviewed and found to be negative.    Physical Exam   /84   Pulse 92   Temp 100.4  F (38  C) (Tympanic)   Resp 23   Ht 1.6 m (5' 3\")   Wt 56.7 kg (125 lb)   SpO2 91%   BMI 22.14 kg/m       General: awake, comfortable  HEENT: atraumatic  Respiratory: normal effort, clear to auscultation bilaterally  Cardiovascular: tachycardic, radial pulses pulses 2+  Abdomen: soft, nontender, nondistended  Extremities: no deformities, tenderness, edema  Skin: warm, dry, no rashes  Neuro: alert, no focal deficits    ED Course      ED Course as of 06/18/24 0527   Tue Jun 18, 2024   0121 EKG: AF with RVR rate 163 with a couple PVCs, nonischemic with no ST abnormalities, LBBB, I/II/V3-6 TWI.      0335 Plan to board patient in ED until near morning shift change with RN staffing will be available for ICU transfer.   0355 EKG: Rate-controlled AF HR 97, nonischemic with no ST abnormalities, LBBB, I/II/V3-6 TWI.         Results for orders placed or performed during the hospital encounter of " 06/18/24 (from the past 24 hour(s))   CBC with platelets differential    Narrative    The following orders were created for panel order CBC with platelets differential.  Procedure                               Abnormality         Status                     ---------                               -----------         ------                     CBC with platelets and d...[[id number]]  Abnormal            Final result               RBC and Platelet Morphology[[id number]]  Abnormal            Final result                 Please view results for these tests on the individual orders.   Basic metabolic panel   Result Value Ref Range    Sodium 131 (L) 135 - 145 mmol/L    Potassium 3.0 (L) 3.4 - 5.3 mmol/L    Chloride 90 (L) 98 - 107 mmol/L    Carbon Dioxide (CO2) 28 22 - 29 mmol/L    Anion Gap 13 7 - 15 mmol/L    Urea Nitrogen 10.4 8.0 - 23.0 mg/dL    Creatinine 0.76 0.51 - 0.95 mg/dL    GFR Estimate 81 >60 mL/min/1.73m2    Calcium 9.4 8.8 - 10.2 mg/dL    Glucose 163 (H) 70 - 99 mg/dL   [place] Draw    Narrative    The following orders were created for panel order [place] Draw.  Procedure                               Abnormality         Status                     ---------                               -----------         ------                     Extra Blue Top Tube[[id number]]                              Final result               Extra Red Top Tube[[id number]]                               Final result               Extra Green Top (Lithium...[[id number]]                                                 Extra Purple Top Tube[[id number]]                                                       Extra Green Top (Lithium...[[id number]]                      Final result                 Please view results for these tests on the individual orders.   CBC with platelets and differential   Result Value Ref Range    WBC Count 4.8 4.0 - 11.0 10e3/uL    RBC Count 4.89 3.80 - 5.20 10e6/uL    Hemoglobin 15.5 11.7 - 15.7 g/dL    Hematocrit 43.8  35.0 - 47.0 %    MCV 90 78 - 100 fL    MCH 31.7 26.5 - 33.0 pg    MCHC 35.4 31.5 - 36.5 g/dL    RDW 11.7 10.0 - 15.0 %    Platelet Count 83 (L) 150 - 450 10e3/uL    % Neutrophils 60 %    % Lymphocytes 28 %    % Monocytes 11 %    % Eosinophils 0 %    % Basophils 1 %    % Immature Granulocytes 0 %    NRBCs per 100 WBC 0 <1 /100    Absolute Neutrophils 2.9 1.6 - 8.3 10e3/uL    Absolute Lymphocytes 1.3 0.8 - 5.3 10e3/uL    Absolute Monocytes 0.5 0.0 - 1.3 10e3/uL    Absolute Eosinophils 0.0 0.0 - 0.7 10e3/uL    Absolute Basophils 0.0 0.0 - 0.2 10e3/uL    Absolute Immature Granulocytes 0.0 <=0.4 10e3/uL    Absolute NRBCs 0.0 10e3/uL   Extra Blue Top Tube   Result Value Ref Range    Hold Specimen JIC    Extra Red Top Tube   Result Value Ref Range    Hold Specimen JIC    Extra Green Top (Lithium Heparin) ON ICE   Result Value Ref Range    Hold Specimen JIC    RBC and Platelet Morphology   Result Value Ref Range    RBC Morphology Confirmed RBC Indices     Platelet Assessment  Automated Count Confirmed. Platelet morphology is normal.     Automated Count Confirmed. Platelet morphology is normal.    Giant Platelets      Acanthocytes      Kenyetta Rods      Basophilic Stippling      Bite Cells      Blister Cells      [place] Cells      Elliptocytes      Hgb C Crystals      [place] Bodies      Hypersegmented Neutrophils      Polychromasia      RBC agglutination      RBC Fragments      Reactive Lymphocytes Present (A) None Seen    Rouleaux      Sickle Cells      Smudge Cells      Spherocytes      Stomatocytes      Target Cells      Teardrop Cells      Toxic Neutrophils      Pathologist Review Comments (Blood)     Lactic Acid Whole Blood w/ 1x repeat in 2 hrs when >2   Result Value Ref Range    Lactic Acid, Initial 2.0 0.7 - 2.0 mmol/L   Magnesium   Result Value Ref Range    Magnesium 1.8 1.7 - 2.3 mg/dL   TSH Reflex GH   Result Value Ref Range    TSH 4.50 (H) 0.30 - 4.20 uIU/mL   Troponin T, High Sensitivity   Result Value Ref  Range    Troponin T, High Sensitivity 24 (H) <=14 ng/L   T4 free   Result Value Ref Range    Free T4 1.24 0.90 - 1.70 ng/dL   Hepatic panel   Result Value Ref Range    Protein Total 7.4 6.4 - 8.3 g/dL    Albumin 4.0 3.5 - 5.2 g/dL    Bilirubin Total 0.8 <=1.2 mg/dL    Alkaline Phosphatase 147 40 - 150 U/L     (H) 0 - 45 U/L    ALT 81 (H) 0 - 50 U/L    Bilirubin Direct 0.23 0.00 - 0.30 mg/dL   Symptomatic Influenza A/B, RSV, & SARS-CoV2 PCR (COVID-19) Nose    Specimen: Nose; Swab   Result Value Ref Range    Influenza A PCR Negative Negative    Influenza B PCR Negative Negative    RSV PCR Negative Negative    SARS CoV2 PCR Negative Negative    Narrative    Testing was performed using the Xpert Xpress CoV2/Flu/RSV Assay on the [employer] GeneXpert Instrument. This test should be ordered for the detection of SARS-CoV-2, influenza, and RSV viruses in individuals who meet clinical and/or epidemiological criteria. Test performance is unknown in asymptomatic patients. This test is for in vitro diagnostic use under the FDA EUA for laboratories certified under CLIA to perform high or moderate complexity testing. This test has not been FDA cleared or approved. A negative result does not rule out the presence of PCR inhibitors in the specimen or target RNA in concentration below the limit of detection for the assay. If only one viral target is positive but coinfection with multiple targets is suspected, the sample should be re-tested with another FDA cleared, approved, or authorized test, if coinfection would change clinical management. This test was validated by the [facility]. These laboratories are certified under the Clinical Laboratory Improvement Amendments of 1988 (CLIA-88) as qualified to perform high complexity laboratory testing.   XR Chest Port 1 View    Narrative    PROCEDURE INFORMATION:   Exam: XR Chest   Exam date and time: 6/18/2024 1:40 AM   Age: 78 years old   Clinical indication: Cough  and fever; Additional info: Cough fever     TECHNIQUE:   Imaging protocol: Radiologic exam of the chest.   Views: 1 view.     COMPARISON:   No relevant prior studies available.     FINDINGS:   Lungs: Unremarkable. No consolidation.   Pleural spaces: No evidence of pleural effusion. No pneumothorax.   Heart/Mediastinum: The cardiac silhouette is enlarged. Cardiac monitor leads   overlie the thorax.   Vasculature: There is atherosclerotic calcification of the aortic arch.   Bones/joints: There are degenerative changes of the spine.       Impression    IMPRESSION:   Cardiomegaly. Otherwise, no radiographic evidence seen for an acute   cardiopulmonary process.     THIS DOCUMENT HAS BEEN ELECTRONICALLY SIGNED BY TARA IZQUIERDO MD   Troponin T, High Sensitivity   Result Value Ref Range    Troponin T, High Sensitivity 23 (H) <=14 ng/L   UA with Microscopic reflex to Culture    Specimen: Urine, Clean Catch   Result Value Ref Range    Color Urine Yellow Colorless, Straw, Light Yellow, Yellow    Appearance Urine Slightly Cloudy (A) Clear    Glucose Urine Negative Negative mg/dL    Bilirubin Urine Negative Negative    Ketones Urine 10 (A) Negative mg/dL    Specific Gravity Urine 1.020 1.000 - 1.030    Blood Urine Moderate (A) Negative    pH Urine 6.0 5.0 - 9.0    Protein Albumin Urine 100 (A) Negative mg/dL    Urobilinogen Urine Normal Normal, 2.0 mg/dL    Nitrite Urine Negative Negative    Leukocyte Esterase Urine Large (A) Negative    Mucus Urine Present (A) None Seen /LPF    RBC Urine 9 (H) <=2 /HPF    WBC Urine 52 (H) <=5 /HPF    Squamous Epithelials Urine 1 <=1 /HPF    Transitional Epithelials Urine 2 (H) <=1 /HPF    Hyaline Casts Urine 42 (H) <=2 /LPF    Narrative    Urine Culture ordered based on laboratory criteria       Medications   diltiazem (CARDIZEM) 125 mg in dextrose 5 % 125 mL infusion (0 mg/hr Intravenous Paused 6/18/24 2017)   sodium chloride 0.9 % infusion ( Intravenous Canceled Entry 6/18/24 0402)    amiodarone (NEXTERONE) 1.8 mg/mL in dextrose 5% 200 mL ADULT STANDARD infusion (1 mg/min Intravenous $New Bag 6/18/24 0301)   amiodarone (NEXTERONE) 1.8 mg/mL in dextrose 5% 200 mL ADULT STANDARD infusion (has no administration in time range)   acetaminophen (TYLENOL) tablet 1,000 mg (0 mg Oral Hold 6/18/24 0252)   cefTRIAXone (ROCEPHIN) 1 g vial to attach to  mL bag for ADULTS or NS 50 mL bag for PEDS (has no administration in time range)   diltiazem (CARDIZEM) injection 10 mg (10 mg Intravenous $Given 6/18/24 0106)   magnesium sulfate 2 g in 50 mL sterile water intermittent infusion (0 g Intravenous Stopped 6/18/24 0205)   calcium gluconate 10 % injection 1 g (0 g Intravenous Stopped 6/18/24 0154)   sodium chloride 0.9% BOLUS 1,000 mL (0 mLs Intravenous Stopped 6/18/24 0205)   ondansetron (ZOFRAN) injection 4 mg (4 mg Intravenous $Given 6/18/24 0125)   potassium chloride 10 mEq in 100 mL sterile water infusion (0 mEq Intravenous Stopped 6/18/24 0223)   ketorolac (TORADOL) injection 15 mg (15 mg Intravenous $Given 6/18/24 0148)   amiodarone (NEXTERONE) bolus 150 mg (0 mg Intravenous Stopped 6/18/24 0304)   cefTRIAXone (ROCEPHIN) 1 g vial to attach to  mL bag for ADULTS or NS 50 mL bag for PEDS (0 g Intravenous Stopped 6/18/24 0420)       Assessments & Plan (with Medical Decision Making)     I have reviewed the nursing notes.    78 year old female evaluated for viral type symptoms including nonproductive cough, body aches, fatigue, nausea, anorexia.  Found to be febrile here.  Initially with normal rate but soon developing AF with RVR heart rate 163.  Diltiazem push given and then IV diltiazem drip started.  Ultimately required additional rate control with amiodarone eventually achieving rate control heart rate 90s.  Evaluation including labs, chest x-ray, UA with questionable UTI.  Given ceftriaxone to ensure coverage for possible bacterial etiology (potential UTI versus tickborne illness versus  other) with patient meeting sepsis.  Will need admission for management of AF RVR and to follow blood and urine cultures.  Suspect viral etiology however.  Case discussed with Dr. Bell who accepts ICU admission.     I have reviewed the findings, diagnosis, plan with the patient.    Critical care time:  45 minutes of critical care time was spent with this patient, exclusive of all other separately billable services, including EKG/labs/imaging review, managing acute condition, and communications with specialists/hospitalists.      New Prescriptions    No medications on file       Final diagnoses:   Acute viral syndrome   Atrial fibrillation with rapid ventricular response (H)   Sepsis without acute organ dysfunction, due to unspecified organism (H)       6/18/2024   [facility]       Andreas Rios MD  06/18/24 2359

## 2024-06-18 NOTE — PROGRESS NOTES
[facility]    Medicine Progress Note - Hospitalist Service    Date of Admission:  6/18/2024    Assessment & Plan     Afib with RVR  New onset. Induced by illness.  Received diltiazem in ED, then started on amio drip and off diltiazem  Rate in the 100s. Continue amiodarone  Normal TSH. Obtain ECHO  CHADS-VASC at least 3 - hypertension, age, female; would need anticoagulation however given thrombocytopenia, will hold off for now      Sepsis, potential  Viral syndrome  Blood and urine cultures pending  CXR: clear  COVID/flu/RSV negative  Lactic acid wnl  Potential anaplasma with elevated LFTs and thrombocytopenia  Start doxycycline 100 mg BID IV due to nausea  Continue IVF and symptomatic treatment    Abnormal UA  Received 1 dose ceftriaxone IV  Urine culture pending      Hyponatremia  Cont IVF, repeat BMP     Hypokalemia  Replacement protocol     Transaminitis  Thrombocytopenia  -may be due to underlying viral illness or tick disease   -Check CMV and EBV  -Trend labs    Hypertension  Stop amlodipine, use rate controlling medication after ECHO results          Diet: Combination Diet Regular Diet    DVT Prophylaxis: Pneumatic Compression Devices  Rtan Catheter: Not present  Lines: None     Cardiac Monitoring: ACTIVE order. Indication: Tachyarrhythmias, acute (48 hours)  Code Status: Full Code      Clinically Significant Risk Factors Present on Admission        # Hypokalemia: Lowest K = 3 mmol/L in last 2 days, will replace as needed         # Thrombocytopenia: Lowest platelets = 83 in last 2 days, will monitor for bleeding   # Hypertension: Noted on problem list                 # Asthma: noted on problem list        Disposition Plan     Medically Ready for Discharge: Anticipated in 2-4 Days             Kiko Cain MD  Hospitalist Service  [facility]  Securely message with ubigrate (more info)  Text page via [facility] Paging/Directory    ______________________________________________________________________    Interval History   Feeling a little better this morning. Lacked appetite, but would like to try liquids. No diarrhea. Had some diffuse body aches and general malaise before admission. Not out in the woods, but her  and cat are outside a lot.    Physical Exam   Vital Signs: Temp: 98.6  F (37  C) Temp src: Tympanic BP: 137/85 Pulse: 108   Resp: 20 SpO2: 98 % O2 Device: Nasal cannula Oxygen Delivery: 2 LPM  Weight: 125 lbs 0 oz    General Appearance: Alert. No acute distress  Chest/Respiratory Exam: Left greater than right base crackles.  Cardiovascular Exam: Regular rate and rhythm. S1, S2, no murmur, gallop, or rubs.  Gastrointestinal Exam: Soft, nontender  Extremities: No lower extremity edema.  Psychiatric: Normal affect and mentation      Medical Decision Making             Data     I have personally reviewed the following data over the past 24 hrs:    4.8  \   15.5   / 83 (L)     131 (L) 90 (L) 10.4 /  163 (H)   3.0 (L) 28 0.76 \     ALT: 81 (H) AST: 106 (H) AP: 147 TBILI: 0.8   ALB: 4.0 TOT PROTEIN: 7.4 LIPASE: N/A     Trop: 23 (H) BNP: N/A     TSH: 4.50 (H) T4: 1.24 A1C: N/A     Procal: N/A CRP: N/A Lactic Acid: 2.0         Imaging results reviewed over the past 24 hrs:   Recent Results (from the past 24 hour(s))   XR Chest Port 1 View    Narrative    PROCEDURE INFORMATION:   Exam: XR Chest   Exam date and time: 6/18/2024 1:40 AM   Age: 78 years old   Clinical indication: Cough and fever; Additional info: Cough fever     TECHNIQUE:   Imaging protocol: Radiologic exam of the chest.   Views: 1 view.     COMPARISON:   No relevant prior studies available.     FINDINGS:   Lungs: Unremarkable. No consolidation.   Pleural spaces: No evidence of pleural effusion. No pneumothorax.   Heart/Mediastinum: The cardiac silhouette is enlarged. Cardiac monitor leads   overlie the thorax.   Vasculature: There is atherosclerotic calcification of  the aortic arch.   Bones/joints: There are degenerative changes of the spine.       Impression    IMPRESSION:   Cardiomegaly. Otherwise, no radiographic evidence seen for an acute   cardiopulmonary process.     THIS DOCUMENT HAS BEEN ELECTRONICALLY SIGNED BY TARA IZQUIERDO MD

## 2024-06-18 NOTE — ED NOTES
At 0334 was able to reduce Cardizem to 10mg/hr.   At 0359 reduced the Cardizem to 5 mg.hr.   Heart rate is currently 80's to 100's. Repeat EKG preformed, shows AFIB.  BP remains stable with a good MAP.    Pt is attempting to rest at this time.  She has been able to eat ice chips.  Analia Gardner RN on 6/18/2024 at 4:06 AM

## 2024-06-19 ENCOUNTER — APPOINTMENT (OUTPATIENT)
Dept: GENERAL RADIOLOGY | Facility: OTHER | Age: 79
DRG: 871 | End: 2024-06-19
Attending: STUDENT IN AN ORGANIZED HEALTH CARE EDUCATION/TRAINING PROGRAM
Payer: MEDICARE

## 2024-06-19 LAB
ALBUMIN SERPL BCG-MCNC: 3.4 G/DL (ref 3.5–5.2)
ALLEN'S TEST: NO
ALP SERPL-CCNC: 162 U/L (ref 40–150)
ALT SERPL W P-5'-P-CCNC: 81 U/L (ref 0–50)
ANION GAP SERPL CALCULATED.3IONS-SCNC: 13 MMOL/L (ref 7–15)
AST SERPL W P-5'-P-CCNC: 91 U/L (ref 0–45)
BACTERIA UR CULT: NORMAL
BASE EXCESS BLDA CALC-SCNC: -3 MMOL/L (ref -3–3)
BILIRUB SERPL-MCNC: 0.6 MG/DL
BUN SERPL-MCNC: 14.6 MG/DL (ref 8–23)
CALCIUM SERPL-MCNC: 9 MG/DL (ref 8.8–10.2)
CHLORIDE SERPL-SCNC: 97 MMOL/L (ref 98–107)
CMV IGM SERPL IA-ACNC: <8 AU/ML
CMV IGM SERPL IA-ACNC: NEGATIVE
COHGB MFR BLD: 99.6 % (ref 95–96)
CREAT SERPL-MCNC: 0.55 MG/DL (ref 0.51–0.95)
DEPRECATED HCO3 PLAS-SCNC: 20 MMOL/L (ref 22–29)
EBV VCA IGM SER IA-ACNC: <10 U/ML
EBV VCA IGM SER IA-ACNC: NORMAL
EGFRCR SERPLBLD CKD-EPI 2021: >90 ML/MIN/1.73M2
ERYTHROCYTE [DISTWIDTH] IN BLOOD BY AUTOMATED COUNT: 12.1 % (ref 10–15)
GLUCOSE BLDC GLUCOMTR-MCNC: 153 MG/DL (ref 70–99)
GLUCOSE SERPL-MCNC: 149 MG/DL (ref 70–99)
HCO3 BLD-SCNC: 23 MMOL/L (ref 21–28)
HCT VFR BLD AUTO: 41.5 % (ref 35–47)
HGB BLD-MCNC: 14.2 G/DL (ref 11.7–15.7)
L PNEUMO1 AG UR QL IA: NEGATIVE
LACTATE SERPL-SCNC: 2.2 MMOL/L (ref 0.7–2)
MAGNESIUM SERPL-MCNC: 1.9 MG/DL (ref 1.7–2.3)
MCH RBC QN AUTO: 31.6 PG (ref 26.5–33)
MCHC RBC AUTO-ENTMCNC: 34.2 G/DL (ref 31.5–36.5)
MCV RBC AUTO: 92 FL (ref 78–100)
O2/TOTAL GAS SETTING VFR VENT: 100 %
PCO2 BLD: 45 MM HG (ref 35–45)
PH BLD: 7.32 [PH] (ref 7.35–7.45)
PLATELET # BLD AUTO: 119 10E3/UL (ref 150–450)
PO2 BLD: 145 MM HG (ref 80–105)
POTASSIUM SERPL-SCNC: 4.1 MMOL/L (ref 3.4–5.3)
PROCALCITONIN SERPL IA-MCNC: 0.71 NG/ML
PROT SERPL-MCNC: 6.6 G/DL (ref 6.4–8.3)
RBC # BLD AUTO: 4.5 10E6/UL (ref 3.8–5.2)
SAO2 % BLDA: 98 % (ref 92–100)
SODIUM SERPL-SCNC: 130 MMOL/L (ref 135–145)
WBC # BLD AUTO: 12.4 10E3/UL (ref 4–11)

## 2024-06-19 PROCEDURE — 99291 CRITICAL CARE FIRST HOUR: CPT | Performed by: STUDENT IN AN ORGANIZED HEALTH CARE EDUCATION/TRAINING PROGRAM

## 2024-06-19 PROCEDURE — 999N000157 HC STATISTIC RCP TIME EA 10 MIN

## 2024-06-19 PROCEDURE — 71045 X-RAY EXAM CHEST 1 VIEW: CPT | Mod: TC

## 2024-06-19 PROCEDURE — 84145 PROCALCITONIN (PCT): CPT | Performed by: FAMILY MEDICINE

## 2024-06-19 PROCEDURE — 36600 WITHDRAWAL OF ARTERIAL BLOOD: CPT | Performed by: STUDENT IN AN ORGANIZED HEALTH CARE EDUCATION/TRAINING PROGRAM

## 2024-06-19 PROCEDURE — 250N000011 HC RX IP 250 OP 636: Mod: JZ | Performed by: FAMILY MEDICINE

## 2024-06-19 PROCEDURE — 83735 ASSAY OF MAGNESIUM: CPT | Performed by: FAMILY MEDICINE

## 2024-06-19 PROCEDURE — 99233 SBSQ HOSP IP/OBS HIGH 50: CPT | Performed by: FAMILY MEDICINE

## 2024-06-19 PROCEDURE — 94640 AIRWAY INHALATION TREATMENT: CPT

## 2024-06-19 PROCEDURE — 250N000013 HC RX MED GY IP 250 OP 250 PS 637: Performed by: INTERNAL MEDICINE

## 2024-06-19 PROCEDURE — 82805 BLOOD GASES W/O2 SATURATION: CPT | Performed by: STUDENT IN AN ORGANIZED HEALTH CARE EDUCATION/TRAINING PROGRAM

## 2024-06-19 PROCEDURE — 80053 COMPREHEN METABOLIC PANEL: CPT | Performed by: INTERNAL MEDICINE

## 2024-06-19 PROCEDURE — 250N000013 HC RX MED GY IP 250 OP 250 PS 637: Performed by: FAMILY MEDICINE

## 2024-06-19 PROCEDURE — 94660 CPAP INITIATION&MGMT: CPT

## 2024-06-19 PROCEDURE — 250N000009 HC RX 250: Performed by: STUDENT IN AN ORGANIZED HEALTH CARE EDUCATION/TRAINING PROGRAM

## 2024-06-19 PROCEDURE — 250N000011 HC RX IP 250 OP 636: Mod: JZ | Performed by: STUDENT IN AN ORGANIZED HEALTH CARE EDUCATION/TRAINING PROGRAM

## 2024-06-19 PROCEDURE — 5A09357 ASSISTANCE WITH RESPIRATORY VENTILATION, LESS THAN 24 CONSECUTIVE HOURS, CONTINUOUS POSITIVE AIRWAY PRESSURE: ICD-10-PCS | Performed by: FAMILY MEDICINE

## 2024-06-19 PROCEDURE — 85027 COMPLETE CBC AUTOMATED: CPT | Performed by: INTERNAL MEDICINE

## 2024-06-19 PROCEDURE — 200N000001 HC R&B ICU

## 2024-06-19 PROCEDURE — 83605 ASSAY OF LACTIC ACID: CPT | Performed by: STUDENT IN AN ORGANIZED HEALTH CARE EDUCATION/TRAINING PROGRAM

## 2024-06-19 RX ORDER — SODIUM CHLORIDE 9 MG/ML
INJECTION, SOLUTION INTRAVENOUS CONTINUOUS PRN
Status: DISCONTINUED | OUTPATIENT
Start: 2024-06-19 | End: 2024-06-19

## 2024-06-19 RX ORDER — ALBUTEROL SULFATE 0.83 MG/ML
2.5 SOLUTION RESPIRATORY (INHALATION)
Status: DISCONTINUED | OUTPATIENT
Start: 2024-06-19 | End: 2024-06-25 | Stop reason: HOSPADM

## 2024-06-19 RX ORDER — DILTIAZEM HCL/D5W 125 MG/125
5-15 PLASTIC BAG, INJECTION (ML) INTRAVENOUS CONTINUOUS
Status: DISCONTINUED | OUTPATIENT
Start: 2024-06-19 | End: 2024-06-20

## 2024-06-19 RX ORDER — SODIUM CHLORIDE 9 MG/ML
INJECTION, SOLUTION INTRAVENOUS CONTINUOUS PRN
Status: DISCONTINUED | OUTPATIENT
Start: 2024-06-19 | End: 2024-06-23

## 2024-06-19 RX ORDER — METOPROLOL TARTRATE 50 MG
50 TABLET ORAL ONCE
Status: COMPLETED | OUTPATIENT
Start: 2024-06-19 | End: 2024-06-19

## 2024-06-19 RX ORDER — LANOLIN ALCOHOL/MO/W.PET/CERES
3 CREAM (GRAM) TOPICAL
Status: DISCONTINUED | OUTPATIENT
Start: 2024-06-19 | End: 2024-06-25 | Stop reason: HOSPADM

## 2024-06-19 RX ORDER — FUROSEMIDE 10 MG/ML
20 INJECTION INTRAMUSCULAR; INTRAVENOUS ONCE
Status: COMPLETED | OUTPATIENT
Start: 2024-06-19 | End: 2024-06-19

## 2024-06-19 RX ORDER — FUROSEMIDE 10 MG/ML
40 INJECTION INTRAMUSCULAR; INTRAVENOUS ONCE
Status: COMPLETED | OUTPATIENT
Start: 2024-06-19 | End: 2024-06-19

## 2024-06-19 RX ORDER — DOXYCYCLINE 100 MG/1
100 CAPSULE ORAL EVERY 12 HOURS SCHEDULED
Status: DISCONTINUED | OUTPATIENT
Start: 2024-06-19 | End: 2024-06-19

## 2024-06-19 RX ADMIN — CEFTRIAXONE 2 G: 2 INJECTION, POWDER, FOR SOLUTION INTRAMUSCULAR; INTRAVENOUS at 20:00

## 2024-06-19 RX ADMIN — METOPROLOL TARTRATE 50 MG: 50 TABLET, FILM COATED ORAL at 14:33

## 2024-06-19 RX ADMIN — PRAVASTATIN SODIUM 20 MG: 10 TABLET ORAL at 22:06

## 2024-06-19 RX ADMIN — ALBUTEROL SULFATE 2.5 MG: 2.5 SOLUTION RESPIRATORY (INHALATION) at 10:22

## 2024-06-19 RX ADMIN — ACETAMINOPHEN 650 MG: 325 TABLET, FILM COATED ORAL at 18:41

## 2024-06-19 RX ADMIN — BACLOFEN 10 MG: 10 TABLET ORAL at 22:06

## 2024-06-19 RX ADMIN — DOXYCYCLINE 100 MG: 100 INJECTION, POWDER, LYOPHILIZED, FOR SOLUTION INTRAVENOUS at 09:38

## 2024-06-19 RX ADMIN — FUROSEMIDE 20 MG: 10 INJECTION, SOLUTION INTRAVENOUS at 10:20

## 2024-06-19 RX ADMIN — FUROSEMIDE 40 MG: 10 INJECTION, SOLUTION INTRAVENOUS at 04:18

## 2024-06-19 RX ADMIN — MONTELUKAST SODIUM 10 MG: 5 TABLET, CHEWABLE ORAL at 22:06

## 2024-06-19 RX ADMIN — Medication 5 MG/HR: at 16:47

## 2024-06-19 RX ADMIN — Medication 3 MG: at 22:06

## 2024-06-19 RX ADMIN — METOPROLOL TARTRATE 50 MG: 50 TABLET, FILM COATED ORAL at 07:56

## 2024-06-19 RX ADMIN — ALBUTEROL SULFATE 2.5 MG: 2.5 SOLUTION RESPIRATORY (INHALATION) at 04:04

## 2024-06-19 RX ADMIN — NITROGLYCERIN 10 MCG/MIN: 20 INJECTION INTRAVENOUS at 04:26

## 2024-06-19 RX ADMIN — METOPROLOL TARTRATE 50 MG: 50 TABLET, FILM COATED ORAL at 22:06

## 2024-06-19 ASSESSMENT — ACTIVITIES OF DAILY LIVING (ADL)
ADLS_ACUITY_SCORE: 36
ADLS_ACUITY_SCORE: 32
ADLS_ACUITY_SCORE: 27
ADLS_ACUITY_SCORE: 33
ADLS_ACUITY_SCORE: 39
ADLS_ACUITY_SCORE: 39
ADLS_ACUITY_SCORE: 33
ADLS_ACUITY_SCORE: 27
ADLS_ACUITY_SCORE: 33
ADLS_ACUITY_SCORE: 39
ADLS_ACUITY_SCORE: 36
ADLS_ACUITY_SCORE: 27
ADLS_ACUITY_SCORE: 27
ADLS_ACUITY_SCORE: 33
ADLS_ACUITY_SCORE: 39
ADLS_ACUITY_SCORE: 27
ADLS_ACUITY_SCORE: 39
ADLS_ACUITY_SCORE: 39
ADLS_ACUITY_SCORE: 27
ADLS_ACUITY_SCORE: 27
ADLS_ACUITY_SCORE: 32
ADLS_ACUITY_SCORE: 33
ADLS_ACUITY_SCORE: 39

## 2024-06-19 NOTE — PROGRESS NOTES
Pt having new onset respiratory failure. Bipap initiated 16/8 at 50% Fio2. Pt breathing in the high 40's. Pt appears to have a hard time controlling her breathing, unable to slow her breathing down. PRN neb given inline. No other Respiratory interventions at this time. RT on standby.

## 2024-06-19 NOTE — PROGRESS NOTES
Dr. Cain called by this writer; wondering if a new lactic acid should be drawn due to elevated level at 2.2 this morning.  No new orders at this time.  Marisabel Moura RN on 6/19/2024 at 3:28 PM

## 2024-06-19 NOTE — PROGRESS NOTES
Pt up to chair for dinner. After getting up, pt complained of dizziness.  Tele-monitor showing A-Fib.  Pt had converted back to A-Fib at 1630.   Dr. Cain notified.   Diltiazem infusion started at 5 mg/hr at 1647.    Marisabel Moura RN on 6/19/2024 at 4:50 PM

## 2024-06-19 NOTE — PROGRESS NOTES
Patient location: [facility]  My location: [facility] ICU  Time: 4:20a  Mode of transmission: tele icu video    79yo f who is in the ICU for a fib with rvr on an amiodarone drip. Overnight, patient had sudden onset of dyspnea and hypoxic respiratory failure, failing nasal canula oxygen therapy. When I called into see her, she is on 100% bipap in respiratory distress using neck and abdominal accessory muscles. Her spo2 at this time was fluctuating in between the upper 80s and lower 90s.     I adminsiterd a nebulizer treatment for her with mild relief.     A chest x-ray was obtained which showed a bat wing pattern suggestive of pulmoanry edema. This pictures makes the most sense as her SBP during this hypoxic event has been in the 200s.     I have ordered her 40mg of IV lasix, asked the staff to place a elias. Additionally, I will place an order for a nitroglycerin drip to help offload her heart.    Will continue to monitor her status. VBG pending.     Critical care time independent of procedures 43 min.

## 2024-06-19 NOTE — PROGRESS NOTES
[facility]    Medicine Progress Note - Hospitalist Service    Date of Admission:  6/18/2024    Assessment & Plan   78yoF with HTN, HLD, prediabetes, and asthma presented to the ED with seven days of dry cough, fever, malaise, body aches, anorexia, and nausea.  Her  had a short bout of diarrhea but no other symptoms.  She otherwise denies sick contacts.  She denies a history of afib. Felt to have a viral syndrome triggering a-fib. Admitted to ICU for rate control.    Afib with RVR  New onset. Induced by illness.  Received diltiazem in ED, then started on amio drip and off diltiazem  Completed amiodarone load  Normal TSH. Obtained ECHO showing normal EF  CHADS-VASC at least 3 - hypertension, age, female; would need anticoagulation however given thrombocytopenia, will hold off for now  Started on metoprolol  Required restart on diltiazem 6/18, then spontaneously converted  Now just went into RVR again on 6/19, restart diltiazem    Sepsis  Viral syndrome  Blood and urine cultures pending  CXR: clear  COVID/flu/RSV negative  Lactic acid wnl  Consider anaplasma with elevated LFTs and thrombocytopenia, started doxycycline  She spiked a fever on 6/18 to 102.1  Repeat blood cultures  Started ceftriaxone, continue day 2  Most likely has pneumonia with elevated procalcitonin    Acute respiratory failure with hypoxia  Not POA, from underlying pneumonia and pulmonary edema with a-fib  Treated with BiPAP, furosemide, nitro drip  Off BiPAP, now on 3 L O2  Wean as tolerated  Treat likely pneumonia    Abnormal UA  Received 1 dose ceftriaxone IV  Urine culture with mixed hannah      Hyponatremia  Improved on IVF     Hypokalemia  Replacement protocol     Transaminitis  Thrombocytopenia  -may be due to underlying viral illness or tick disease   CMV and EBV negative  -Trend labs    Hypertension  Stop amlodipine, use medications for a-fib          Diet: Combination Diet Regular Diet    DVT Prophylaxis:  "Pneumatic Compression Devices  Tran Catheter: Not present  Lines: None     Cardiac Monitoring: ACTIVE order. Indication: Tachyarrhythmias, acute (48 hours)  Code Status: Full Code      Clinically Significant Risk Factors        # Hypokalemia: Lowest K = 2.9 mmol/L in last 2 days, will replace as needed       # Hypoalbuminemia: Lowest albumin = 3.3 g/dL at 6/18/2024  8:21 AM, will monitor as appropriate   # Thrombocytopenia: Lowest platelets = 70 in last 2 days, will monitor for bleeding   # Hypertension: Noted on problem list                # Overweight: Estimated body mass index is 25.19 kg/m  as calculated from the following:    Height as of this encounter: 1.6 m (5' 3\").    Weight as of this encounter: 64.5 kg (142 lb 3.2 oz)., PRESENT ON ADMISSION     # Asthma: noted on problem list        Disposition Plan     Medically Ready for Discharge: Anticipated in 2-4 Days             Kiko Cain MD  Hospitalist Service  [facility]  Securely message with [employer] (more info)  Text page via [employer] Paging/Directory   ______________________________________________________________________    Interval History   Had respiratory distress in the night. Required BiPAP and furosemide + nitroglycerin drip  Improved this morning, now on 3 L O2.    Physical Exam   Vital Signs: Temp: 97.5  F (36.4  C) Temp src: Tympanic BP: 125/61 Pulse: 75   Resp: 24 SpO2: 92 % O2 Device: Nasal cannula Oxygen Delivery: 3 LPM  Weight: 142 lbs 3.15 oz    General Appearance: Alert. No acute distress  Chest/Respiratory Exam: Left greater than right base crackles.  Cardiovascular Exam: Regular rate and rhythm. S1, S2, no murmur, gallop, or rubs.  Gastrointestinal Exam: Soft, nontender  Extremities: No lower extremity edema.  Psychiatric: Normal affect and mentation      Medical Decision Making             Data     I have personally reviewed the following data over the past 24 hrs:    12.4 (H)  \   14.2   / 119 (L)     130 (L) 97 " (L) 14.6 /  149 (H)   4.1 20 (L) 0.55 \     ALT: 81 (H) AST: 91 (H) AP: 162 (H) TBILI: 0.6   ALB: 3.4 (L) TOT PROTEIN: 6.6 LIPASE: N/A     Procal: 0.71 (H) CRP: N/A Lactic Acid: 2.2 (H)         Imaging results reviewed over the past 24 hrs:   Recent Results (from the past 24 hour(s))   XR Chest Port 1 View    Narrative    PROCEDURE INFORMATION:   Exam: XR Chest   Exam date and time: 6/19/2024 4:10 AM   Age: 78 years old   Clinical indication: Other: Resp distress; Additional info: Respiratory   distress     TECHNIQUE:   Imaging protocol: Radiologic exam of the chest.   Views: 1 view.     COMPARISON:   CR XR CHEST PORT 1 VIEW 6/18/2024 1:40 AM     FINDINGS:   Lungs: Diffuse interstitial thickening. Hazy ground-glass opacities bilaterally   with central predominance.   Pleural spaces:  Suspect small volume bilateral pleural effusions. No   pneumothorax.   Heart/Mediastinum: Cardiac enlargement.   Bones/joints: Unremarkable.       Impression    IMPRESSION:   Interval worsening of interstitial and alveolar pulmonary edema features.     THIS DOCUMENT HAS BEEN ELECTRONICALLY SIGNED BY AME SHERMAN MD

## 2024-06-19 NOTE — PROGRESS NOTES
SAFETY CHECKLIST  ID Bands and Risk clasps correct and in place (DNR, Fall risk, Allergy, Latex, Limb):  Yes  All Lines Reconciled and labeled correctly: Yes  Whiteboard updated:Yes  Environmental interventions: Yes    Marisabel Moura RN on 6/19/2024 at 7:16 AM

## 2024-06-19 NOTE — PROGRESS NOTES
Per report, Amiodarone infusion was stopped overnight.  Marisabel Moura RN on 6/19/2024 at 7:30 AM

## 2024-06-19 NOTE — PROGRESS NOTES
Shift Summary    Pt is currently on 1 LPM which has been used mainly when sleeping due to SpO2 dropping when sleeping on Room Air. Pt on Room Air while awake for most of the day. No further interventions at this time.    Jumana Hewitt, RT

## 2024-06-19 NOTE — PROGRESS NOTES
Pt arrived on unit this AM, at start of shift, Pt was on amiodarone gtt and NS fluids at shift change. Diltiazem was restarted due to HR increasing to 140-160's, then stopped again due to BP's decreased and HR maintained in 70's-90's, however pt's HR increased again and diltiazem was restarted which caused pt's rhythm to convert to normal sinus, EKG confirmed. Diltiazem gtt continued per provider orders but then again stopped due to decreased blood pressures at 1730, has remained off since. Pt has been on 1-2 L of O2 NC throughout shift, O2 drops while sleeping. She was able to stand pivot to bedside commode but noted generalized weakness. Pt did develop a low grade fever, and reported a headache, oral Tylenol given w/ improvement. Skin has been cool and clammy. Pt able to reposition self. SCD's applied but pt requested off shortly after application.     Milan Arevalo RN on 6/18/2024 at 7:15 PM

## 2024-06-19 NOTE — PLAN OF CARE
Goal Outcome Evaluation:  Pt is A&O X4. Headache and neck pain 5/10, PRN Tylenol given.  Temps highest at 99.6.  Taken off BIPAP this AM by RT. Attempted O2 at 1L and pt dropped to 87%, titrated up to 3L NC. Lung sounds with expiratory wheezes this morning, fine crackles now throughout. FAYE. Abdominal muscle use with breathing.  Skin intact.   Abdomen rounded, soft, audible bowel sounds. Oral intake poor.  Tran catheter removed today at 1617. Output 1,000 mL prior to removal.   No void thus far since removal. Received 1X IV Lasix this AM.  Up to chair assist of 1.  Diltiazem infusion running through rt PIV now at 15 mg/hr, left PIV saline locked.  Marisabel Moura RN on 6/19/2024 at 6:46 PM        Plan of Care Reviewed With: patient    Overall Patient Progress: improving

## 2024-06-19 NOTE — PLAN OF CARE
Problem: Risk for Delirium  Goal: Optimal Coping  Outcome: Progressing  Goal: Improved Behavioral Control  Outcome: Progressing  Intervention: Minimize Safety Risk  Recent Flowsheet Documentation  Taken 6/19/2024 0041 by Piotr Nguyen RN  Trust Relationship/Rapport:   care explained   questions answered   questions encouraged  Taken 6/18/2024 1939 by Piotr Nguyen RN  Trust Relationship/Rapport:   care explained   questions answered   questions encouraged  Goal: Improved Attention and Thought Clarity  Outcome: Progressing  Goal: Improved Sleep  Outcome: Progressing  Intervention: Promote Sleep  Recent Flowsheet Documentation  Taken 6/19/2024 0041 by Piotr Nguyen RN  Sleep/Rest Enhancement:   awakenings minimized   noise level reduced   room darkened   Goal Outcome Evaluation:

## 2024-06-19 NOTE — PROGRESS NOTES
Found patient about 0200 shivering in bed and was given warm blanket and temp in room was increased. Patient had a temp at the beginning of the shift but had been normothermic after one dose of tylenol. Patient declined any further problems at that time and no respiratory distress noted at that time. About 0325 Patient put on call light and was seen to be visibly shaking more so then prior encounter and was having a difficult time breathing. Patient O2 was turned up and then placed on Oxymask. Lung sounds were course with expiratory wheezes. Charge nurse called and TeleICU contacted. TeleICU placed orders for Blood work, chest x-ray, BiPAP, etc. Patient placed on BiPAP and after some time work of breathing decreased patient appeared more relaxed and shaking subsided. Patient was normothermic at that time. Orders were given for lasix push and nitroglycerin drip to help bring down BP which had been elevated during respiratory distress. Patient was also tachycardic and tachypnic. Both were resolved. Tran placed once patient more stable. Patient does not have any complaints at this time and appears stable. Lungs sound of fine crackles, diminished in bases, with very mild expiratory wheezes.

## 2024-06-20 ENCOUNTER — APPOINTMENT (OUTPATIENT)
Dept: CT IMAGING | Facility: OTHER | Age: 79
DRG: 871 | End: 2024-06-20
Attending: FAMILY MEDICINE
Payer: MEDICARE

## 2024-06-20 PROBLEM — J96.01 SEPSIS WITH ACUTE HYPOXIC RESPIRATORY FAILURE WITHOUT SEPTIC SHOCK (H): Status: ACTIVE | Noted: 2024-06-18

## 2024-06-20 PROBLEM — N10 ACUTE PYELONEPHRITIS: Status: ACTIVE | Noted: 2024-06-18

## 2024-06-20 PROBLEM — R65.20 SEPSIS WITH ACUTE HYPOXIC RESPIRATORY FAILURE WITHOUT SEPTIC SHOCK (H): Status: ACTIVE | Noted: 2024-06-18

## 2024-06-20 LAB
ALBUMIN SERPL BCG-MCNC: 3.2 G/DL (ref 3.5–5.2)
ALP SERPL-CCNC: 124 U/L (ref 40–150)
ALT SERPL W P-5'-P-CCNC: 57 U/L (ref 0–50)
ANION GAP SERPL CALCULATED.3IONS-SCNC: 11 MMOL/L (ref 7–15)
AST SERPL W P-5'-P-CCNC: 67 U/L (ref 0–45)
BILIRUB SERPL-MCNC: 0.7 MG/DL
BUN SERPL-MCNC: 12.2 MG/DL (ref 8–23)
CALCIUM SERPL-MCNC: 8.8 MG/DL (ref 8.8–10.2)
CHLORIDE SERPL-SCNC: 96 MMOL/L (ref 98–107)
CREAT SERPL-MCNC: 0.48 MG/DL (ref 0.51–0.95)
DEPRECATED HCO3 PLAS-SCNC: 27 MMOL/L (ref 22–29)
EGFRCR SERPLBLD CKD-EPI 2021: >90 ML/MIN/1.73M2
ERYTHROCYTE [DISTWIDTH] IN BLOOD BY AUTOMATED COUNT: 12.1 % (ref 10–15)
GLUCOSE SERPL-MCNC: 121 MG/DL (ref 70–99)
HCT VFR BLD AUTO: 37.7 % (ref 35–47)
HGB BLD-MCNC: 13.3 G/DL (ref 11.7–15.7)
HOLD SPECIMEN: NORMAL
HOLD SPECIMEN: NORMAL
LACTATE SERPL-SCNC: 1.1 MMOL/L (ref 0.7–2)
MCH RBC QN AUTO: 32.2 PG (ref 26.5–33)
MCHC RBC AUTO-ENTMCNC: 35.3 G/DL (ref 31.5–36.5)
MCV RBC AUTO: 91 FL (ref 78–100)
PLATELET # BLD AUTO: 141 10E3/UL (ref 150–450)
POTASSIUM SERPL-SCNC: 2.8 MMOL/L (ref 3.4–5.3)
POTASSIUM SERPL-SCNC: 3.8 MMOL/L (ref 3.4–5.3)
PROT SERPL-MCNC: 6.2 G/DL (ref 6.4–8.3)
RBC # BLD AUTO: 4.13 10E6/UL (ref 3.8–5.2)
SODIUM SERPL-SCNC: 134 MMOL/L (ref 135–145)
WBC # BLD AUTO: 7.1 10E3/UL (ref 4–11)

## 2024-06-20 PROCEDURE — 36416 COLLJ CAPILLARY BLOOD SPEC: CPT | Performed by: FAMILY MEDICINE

## 2024-06-20 PROCEDURE — 250N000011 HC RX IP 250 OP 636: Performed by: FAMILY MEDICINE

## 2024-06-20 PROCEDURE — 99233 SBSQ HOSP IP/OBS HIGH 50: CPT | Performed by: FAMILY MEDICINE

## 2024-06-20 PROCEDURE — 83605 ASSAY OF LACTIC ACID: CPT | Performed by: FAMILY MEDICINE

## 2024-06-20 PROCEDURE — 84132 ASSAY OF SERUM POTASSIUM: CPT | Performed by: FAMILY MEDICINE

## 2024-06-20 PROCEDURE — 74177 CT ABD & PELVIS W/CONTRAST: CPT | Mod: MA

## 2024-06-20 PROCEDURE — 85027 COMPLETE CBC AUTOMATED: CPT | Performed by: FAMILY MEDICINE

## 2024-06-20 PROCEDURE — 36415 COLL VENOUS BLD VENIPUNCTURE: CPT | Performed by: FAMILY MEDICINE

## 2024-06-20 PROCEDURE — 93010 ELECTROCARDIOGRAM REPORT: CPT | Performed by: INTERNAL MEDICINE

## 2024-06-20 PROCEDURE — 80053 COMPREHEN METABOLIC PANEL: CPT | Performed by: FAMILY MEDICINE

## 2024-06-20 PROCEDURE — 250N000009 HC RX 250: Performed by: STUDENT IN AN ORGANIZED HEALTH CARE EDUCATION/TRAINING PROGRAM

## 2024-06-20 PROCEDURE — 250N000011 HC RX IP 250 OP 636: Mod: JZ | Performed by: FAMILY MEDICINE

## 2024-06-20 PROCEDURE — 93005 ELECTROCARDIOGRAM TRACING: CPT

## 2024-06-20 PROCEDURE — 120N000001 HC R&B MED SURG/OB

## 2024-06-20 PROCEDURE — 250N000013 HC RX MED GY IP 250 OP 250 PS 637: Performed by: FAMILY MEDICINE

## 2024-06-20 PROCEDURE — 250N000013 HC RX MED GY IP 250 OP 250 PS 637: Performed by: INTERNAL MEDICINE

## 2024-06-20 RX ORDER — POTASSIUM CHLORIDE 1500 MG/1
40 TABLET, EXTENDED RELEASE ORAL ONCE
Status: COMPLETED | OUTPATIENT
Start: 2024-06-20 | End: 2024-06-20

## 2024-06-20 RX ORDER — IOPAMIDOL 755 MG/ML
80 INJECTION, SOLUTION INTRAVASCULAR ONCE
Status: COMPLETED | OUTPATIENT
Start: 2024-06-20 | End: 2024-06-20

## 2024-06-20 RX ORDER — LORAZEPAM 1 MG/1
1 TABLET ORAL EVERY 4 HOURS PRN
Status: DISCONTINUED | OUTPATIENT
Start: 2024-06-20 | End: 2024-06-25 | Stop reason: HOSPADM

## 2024-06-20 RX ORDER — POTASSIUM CHLORIDE 1500 MG/1
20 TABLET, EXTENDED RELEASE ORAL ONCE
Status: COMPLETED | OUTPATIENT
Start: 2024-06-20 | End: 2024-06-20

## 2024-06-20 RX ORDER — DILTIAZEM HYDROCHLORIDE 180 MG/1
180 CAPSULE, COATED, EXTENDED RELEASE ORAL DAILY
Status: DISCONTINUED | OUTPATIENT
Start: 2024-06-20 | End: 2024-06-21

## 2024-06-20 RX ORDER — FUROSEMIDE 10 MG/ML
20 INJECTION INTRAMUSCULAR; INTRAVENOUS ONCE
Status: COMPLETED | OUTPATIENT
Start: 2024-06-20 | End: 2024-06-20

## 2024-06-20 RX ADMIN — Medication 10 MG/HR: at 02:17

## 2024-06-20 RX ADMIN — CEFTRIAXONE 2 G: 2 INJECTION, POWDER, FOR SOLUTION INTRAMUSCULAR; INTRAVENOUS at 20:03

## 2024-06-20 RX ADMIN — FUROSEMIDE 20 MG: 10 INJECTION, SOLUTION INTRAMUSCULAR; INTRAVENOUS at 16:59

## 2024-06-20 RX ADMIN — POTASSIUM CHLORIDE 40 MEQ: 1500 TABLET, EXTENDED RELEASE ORAL at 07:34

## 2024-06-20 RX ADMIN — LORAZEPAM 1 MG: 1 TABLET ORAL at 14:00

## 2024-06-20 RX ADMIN — IOPAMIDOL 80 ML: 755 INJECTION, SOLUTION INTRAVENOUS at 10:21

## 2024-06-20 RX ADMIN — DILTIAZEM HYDROCHLORIDE 180 MG: 180 CAPSULE, COATED, EXTENDED RELEASE ORAL at 09:06

## 2024-06-20 RX ADMIN — BACLOFEN 10 MG: 10 TABLET ORAL at 21:09

## 2024-06-20 RX ADMIN — PRAVASTATIN SODIUM 20 MG: 10 TABLET ORAL at 21:09

## 2024-06-20 RX ADMIN — METOPROLOL TARTRATE 50 MG: 50 TABLET, FILM COATED ORAL at 21:09

## 2024-06-20 RX ADMIN — POTASSIUM CHLORIDE 20 MEQ: 1500 TABLET, EXTENDED RELEASE ORAL at 09:06

## 2024-06-20 RX ADMIN — MONTELUKAST SODIUM 10 MG: 5 TABLET, CHEWABLE ORAL at 21:09

## 2024-06-20 RX ADMIN — ALBUTEROL SULFATE 2.5 MG: 2.5 SOLUTION RESPIRATORY (INHALATION) at 02:25

## 2024-06-20 RX ADMIN — METOPROLOL TARTRATE 50 MG: 50 TABLET, FILM COATED ORAL at 09:06

## 2024-06-20 RX ADMIN — ACETAMINOPHEN 650 MG: 325 TABLET, FILM COATED ORAL at 07:30

## 2024-06-20 ASSESSMENT — ACTIVITIES OF DAILY LIVING (ADL)
ADLS_ACUITY_SCORE: 30
ADLS_ACUITY_SCORE: 28
ADLS_ACUITY_SCORE: 27
ADLS_ACUITY_SCORE: 30
ADLS_ACUITY_SCORE: 27
ADLS_ACUITY_SCORE: 30
ADLS_ACUITY_SCORE: 27
ADLS_ACUITY_SCORE: 30
ADLS_ACUITY_SCORE: 27
ADLS_ACUITY_SCORE: 30
ADLS_ACUITY_SCORE: 27
ADLS_ACUITY_SCORE: 30
ADLS_ACUITY_SCORE: 27
ADLS_ACUITY_SCORE: 30
ADLS_ACUITY_SCORE: 27
ADLS_ACUITY_SCORE: 30
ADLS_ACUITY_SCORE: 27
ADLS_ACUITY_SCORE: 30
ADLS_ACUITY_SCORE: 27

## 2024-06-20 NOTE — PROGRESS NOTES
Patient up to commode, had a medium sized bowel movement.  Drank 16oz of water but ate very little.  Would maybe like to try a nutritional shake.  She is shaky and unsteady on her feet.  Assisted to a w/c for CT imaging.  Patient did well with that.  Family is now having a bed bath was a physical assist of one.   is at bedside.

## 2024-06-20 NOTE — PROGRESS NOTES
Patient remained in a-fib overnight. Some SOB reported and was given neb by staff. Patient was complaining of discomfort but could not give a specific location or ailment. Patient appears unwell but no specific suspected cause can be attributed to this appearance.

## 2024-06-20 NOTE — PLAN OF CARE
Problem: Risk for Delirium  Goal: Optimal Coping  Outcome: Not Progressing  Goal: Improved Behavioral Control  Outcome: Not Progressing  Intervention: Minimize Safety Risk  Recent Flowsheet Documentation  Taken 6/19/2024 2350 by Piotr Nguyen RN  Enhanced Safety Measures: review medications for side effects with activity  Trust Relationship/Rapport:   care explained   choices provided   questions answered   questions encouraged  Taken 6/19/2024 1945 by Piotr Nguyen RN  Enhanced Safety Measures: review medications for side effects with activity  Trust Relationship/Rapport:   care explained   choices provided   questions answered   questions encouraged  Goal: Improved Attention and Thought Clarity  Outcome: Not Progressing  Goal: Improved Sleep  Outcome: Not Progressing     Problem: Dysrhythmia  Goal: Normalized Cardiac Rhythm  Outcome: Not Progressing  Intervention: Monitor and Manage Cardiac Rhythm Effect  Recent Flowsheet Documentation  Taken 6/19/2024 2350 by Piotr Nguyen RN  VTE Prevention/Management:   SCDs (sequential compression devices) off   patient refused intervention  Taken 6/19/2024 1945 by Piotr Nguyen RN  VTE Prevention/Management:   SCDs (sequential compression devices) off   patient refused intervention   Goal Outcome Evaluation:

## 2024-06-20 NOTE — PROGRESS NOTES
Interdisciplinary Discharge Planning Note    Anticipated Discharge Date: TBD    Anticipated Discharge Location: Home    Clinical Needs Before Discharge:  stable functional status and stable oxygen requirement    Treatment Needs After Discharge:   TBD    Potential Barriers to Discharge: None identified at this time    OSCAR Varela  6/20/2024,  2:18 PM

## 2024-06-20 NOTE — PROGRESS NOTES
[facility]    Medicine Progress Note - Hospitalist Service    Date of Admission:  6/18/2024    Assessment & Plan   78yoF with HTN, HLD, prediabetes, and asthma presented to the ED with seven days of dry cough, fever, malaise, body aches, anorexia, and nausea.  Her  had a short bout of diarrhea but no other symptoms.  She otherwise denies sick contacts.  She denies a history of afib. Felt to have a viral syndrome triggering a-fib. Admitted to ICU for rate control.     RVR improved with diltiazem and metoprolol. CT scan showing left pyelonephritis, receiving ceftriaxone. Still requiring O2, will need diuresis to wean    Sepsis with acute hypoxic respiratory failure without septic shock (H)   Acute pyelonephritis   CXR: clear  COVID/flu/RSV negative  Lactic acid wnl  Consider anaplasma with elevated LFTs and thrombocytopenia, started doxycycline  Urine culture with mixed hannah  Procalcitonin elevated  She spiked a fever on 6/18 to 102.1, started ceftriaxone. Continue day 3  Repeat blood cultures NGTD  CT scan 6/20 showing left pyelonephritis vs early renal abscess    Afib with RVR  New onset. Induced by illness.  Received diltiazem and magnesium bolus in ED, then started on amio drip and stopped diltiazem  Completed amiodarone load still in a-fib with RVR  Normal TSH. Obtained ECHO showing normal EF  CHADS-VASC at least 3 - hypertension, age, female; would need anticoagulation however given thrombocytopenia, will hold off for now  Started on metoprolol  Required restart on diltiazem 6/18, then spontaneously converted  Went into RVR again on 6/19, restarted diltiazem drip and converted again to sinus 6/20    Acute respiratory failure with hypoxia  Not POA, developed due to sepsis and pulmonary edema with a-fib  Treated with BiPAP, furosemide, nitro drip  Off BiPAP, now on 6 L O2  Diurese  Wean as tolerated    Transaminitis  Secondary to sepsis  Normal bilirubin    Thrombocytopenia  Initially  felt to be from viral illness or tick disease   CMV and EBV negative  Improving from 70 to 141 with antibiotics    Hyponatremia  Improved on IVF     Hypokalemia  Replacement protocol    Hypertension  Stopped amlodipine, use metoprolol and diltiazem instead        Diet: Combination Diet Regular Diet    DVT Prophylaxis: Pneumatic Compression Devices  Tran Catheter: Not present  Lines: None     Cardiac Monitoring: ACTIVE order. Indication: Tachyarrhythmias, acute (48 hours)  Code Status: Full Code      Clinically Significant Risk Factors        # Hypokalemia: Lowest K = 2.8 mmol/L in last 2 days, will replace as needed       # Hypoalbuminemia: Lowest albumin = 3.2 g/dL at 6/20/2024  5:16 AM, will monitor as appropriate   # Thrombocytopenia: Lowest platelets = 119 in last 2 days, will monitor for bleeding   # Hypertension: Noted on problem list                    # Asthma: noted on problem list        Disposition Plan     Medically Ready for Discharge: Anticipated in 2-4 Days             Kiko Cain MD  Hospitalist Service  [facility]  Securely message with [employer] (more info)  Text page via [employer] Paging/Directory   ______________________________________________________________________    Interval History   Went back into a-fib yesterday evening before transfer to medical and required diltiazem drip overnight. Still requiring O2, on 6L. Feels poorly with general malaise this morning. Did not sleep much at all. No abdominal pain.     Physical Exam   Vital Signs: Temp: 97  F (36.1  C) Temp src: Tympanic BP: 139/67 Pulse: 61   Resp: 28 SpO2: 98 % O2 Device: Nasal cannula Oxygen Delivery: 4 LPM  Weight: 138 lbs 7.18 oz    General Appearance: Alert. No acute distress  Chest/Respiratory Exam: Left greater than right base crackles.  Cardiovascular Exam: Regular rate and rhythm. S1, S2, no murmur, gallop, or rubs.  Gastrointestinal Exam: Soft, nontender  Extremities: No lower extremity  edema.  Psychiatric: Normal affect and mentation      Medical Decision Making             Data     I have personally reviewed the following data over the past 24 hrs:    7.1  \   13.3   / 141 (L)     134 (L) 96 (L) 12.2 /  121 (H)   2.8 (L) 27 0.48 (L) \     ALT: 57 (H) AST: 67 (H) AP: 124 TBILI: 0.7   ALB: 3.2 (L) TOT PROTEIN: 6.2 (L) LIPASE: N/A     Procal: N/A CRP: N/A Lactic Acid: 1.1         Imaging results reviewed over the past 24 hrs:   Recent Results (from the past 24 hour(s))   CT Chest (PE) Abdomen Pelvis w Contrast    Narrative    PROCEDURE:  CT CHEST PE ABDOMEN PELVIS W CONTRAST.    HISTORY:  Evaluate for pulmonary embolism.  fever of unknown origin    TECHNIQUE:  Initial pre-contrast  and localizer images were  obtained.  Contrast enhanced helical CT pulmonary angiography was then  performed.  Delayed CT images of the abdomen and pelvis were obtained.  Routine transaxial and post-processed (multiplanar and/or MIP)  reformations were obtained. This CT exam was performed using one or  more the following dose reduction techniques: automated exposure  control, adjustment of the mA and/or kV according to patient size,  and/or iterative reconstruction technique.    COMPARISON:  6/19/24    MEDS/CONTRAST: Isovue 370, 80 mL    PULMONARY CTA FINDINGS:  This is a diagnostic quality helical CT  pulmonary angiogram.  There is no acute pulmonary embolism to the  first subsegmental pulmonary artery level.    OTHER FINDINGS:      The heart is enlarged. No pericardial effusion is seen. The main  pulmonary artery and aorta are normal in caliber. No gross thoracic  adenopathy is identified.    Small bilateral pleural effusions are present. There are dependent  predominant groundglass pulmonary opacities and diffuse intralobular  septal thickening.     The left kidney is abnormal in appearance, with heterogeneous  hypoenhancement throughout the posterior lower cortex measuring up to  3.2 cm. There is no  hydronephrosis. No collecting system calculus is  seen. The bladder is mostly decompressed.    The liver, spleen and pancreas are unremarkable. A right adrenal  nodule is noted. The bowel is normal in caliber. The gallbladder is  contracted. Trace pelvic free fluid is seen.    No suspicious osseous lesion is identified.      Impression    IMPRESSION:    Abnormal appearance of the left kidney with a 3.2 cm area of  heterogeneous hypoenhancement suspicious for focal pyelonephritis  versus an early renal abscess.     No acute pulmonary emboli to the subsegmental level.    Cardiomegaly. Small pleural effusions. Interstitial and groundglass  pulmonary opacities suggest interstitial and alveolar edema.  Superimposed infection is not excluded. Recommend follow-up.    JACOB COTTER MD         SYSTEM ID:  [id number]

## 2024-06-20 NOTE — PROGRESS NOTES
Patient reports an improvement in pain.  She continues to be SOB with somewhat labored respirations.  She has a poor appetite and ate only a few bites of food.  She has converted back into a sinus rhythm.

## 2024-06-20 NOTE — PLAN OF CARE
"  Problem: Adult Inpatient Plan of Care  Goal: Plan of Care Review  Description: The Plan of Care Review/Shift note should be completed every shift.  The Outcome Evaluation is a brief statement about your assessment that the patient is improving, declining, or no change.  This information will be displayed automatically on your shift  note.  Outcome: Progressing  Goal: Patient-Specific Goal (Individualized)  Description: You can add care plan individualizations to a care plan. Examples of Individualization might be:  \"Parent requests to be called daily at 9am for status\", \"I have a hard time hearing out of my right ear\", or \"Do not touch me to wake me up as it startles  me\".  Outcome: Progressing  Goal: Absence of Hospital-Acquired Illness or Injury  Outcome: Progressing  Intervention: Identify and Manage Fall Risk  Recent Flowsheet Documentation  Taken 6/20/2024 1230 by Piper Stephens RN  Safety Promotion/Fall Prevention: safety round/check completed  Taken 6/20/2024 0730 by Piper Stephens RN  Safety Promotion/Fall Prevention:   activity supervised   safety round/check completed  Intervention: Prevent Skin Injury  Recent Flowsheet Documentation  Taken 6/20/2024 1045 by Piper Stephens RN  Body Position: position changed independently  Taken 6/20/2024 0730 by Piper Stephens RN  Body Position: position changed independently  Intervention: Prevent and Manage VTE (Venous Thromboembolism) Risk  Recent Flowsheet Documentation  Taken 6/20/2024 0730 by Piper Stephens RN  VTE Prevention/Management: SCDs (sequential compression devices) off  Intervention: Prevent Infection  Recent Flowsheet Documentation  Taken 6/20/2024 1230 by Piper Stephens RN  Infection Prevention:   hand hygiene promoted   rest/sleep promoted  Taken 6/20/2024 0730 by Piper Stephens RN  Infection Prevention:   hand hygiene promoted   rest/sleep promoted  Goal: Optimal Comfort and Wellbeing  Outcome: Progressing  Intervention: Monitor Pain and " Promote Comfort  Recent Flowsheet Documentation  Taken 6/20/2024 1230 by Piper Stephens RN  Pain Management Interventions: declines  Taken 6/20/2024 0730 by Piper Stephens RN  Pain Management Interventions: medication (see MAR)  Intervention: Provide Person-Centered Care  Recent Flowsheet Documentation  Taken 6/20/2024 1230 by Piper Stephens RN  Trust Relationship/Rapport:   care explained   choices provided   empathic listening provided   thoughts/feelings acknowledged  Taken 6/20/2024 0730 by Piper Stephens RN  Trust Relationship/Rapport:   care explained   choices provided   empathic listening provided   questions encouraged   questions answered  Goal: Readiness for Transition of Care  Outcome: Progressing     Problem: Risk for Delirium  Goal: Optimal Coping  Outcome: Progressing  Intervention: Optimize Psychosocial Adjustment to Delirium  Recent Flowsheet Documentation  Taken 6/20/2024 1230 by Piper Stephens RN  Supportive Measures:   active listening utilized   positive reinforcement provided  Taken 6/20/2024 0730 by Piper Stephens RN  Supportive Measures:   decision-making supported   self-care encouraged  Goal: Improved Behavioral Control  Outcome: Progressing  Intervention: Minimize Safety Risk  Recent Flowsheet Documentation  Taken 6/20/2024 1230 by Piper Stephens RN  Communication Enhancement Strategies: call light answered in person  Trust Relationship/Rapport:   care explained   choices provided   empathic listening provided   thoughts/feelings acknowledged  Taken 6/20/2024 0730 by Piper Stephens RN  Communication Enhancement Strategies: call light answered in person  Trust Relationship/Rapport:   care explained   choices provided   empathic listening provided   questions encouraged   questions answered  Goal: Improved Attention and Thought Clarity  Outcome: Progressing  Intervention: Maximize Cognitive Function  Recent Flowsheet Documentation  Taken 6/20/2024 1230 by Piper Stephens  RN  Reorientation Measures:   clock in view   familiar social contact encouraged  Taken 6/20/2024 0730 by Piper Stephens RN  Sensory Stimulation Regulation: lighting decreased  Reorientation Measures: clock in view  Goal: Improved Sleep  Outcome: Progressing     Problem: Dysrhythmia  Goal: Normalized Cardiac Rhythm  Outcome: Progressing  Intervention: Monitor and Manage Cardiac Rhythm Effect  Recent Flowsheet Documentation  Taken 6/20/2024 0730 by Piper Stephens RN  VTE Prevention/Management: SCDs (sequential compression devices) off   Goal Outcome Evaluation:

## 2024-06-20 NOTE — PROGRESS NOTES
Pt remains on 3L NC, not chronic. BiPAP has been in room on stand by. One PRN given, pt tolerated well. No further respiratory interventions. Chelly Mesa, RRT

## 2024-06-20 NOTE — PROGRESS NOTES
Patient frustrated and has complaints of severe neck pain, a lack of sleep, and generally feels unwell.  Potassium replaced orally.  Patient provided with tylenol for pain and was repositioned up in bed.

## 2024-06-21 LAB
ALBUMIN SERPL BCG-MCNC: 3.3 G/DL (ref 3.5–5.2)
ALP SERPL-CCNC: 118 U/L (ref 40–150)
ALT SERPL W P-5'-P-CCNC: 52 U/L (ref 0–50)
ANION GAP SERPL CALCULATED.3IONS-SCNC: 12 MMOL/L (ref 7–15)
AST SERPL W P-5'-P-CCNC: 48 U/L (ref 0–45)
BILIRUB SERPL-MCNC: 0.6 MG/DL
BUN SERPL-MCNC: 9.3 MG/DL (ref 8–23)
CALCIUM SERPL-MCNC: 8.7 MG/DL (ref 8.8–10.2)
CHLORIDE SERPL-SCNC: 97 MMOL/L (ref 98–107)
CREAT SERPL-MCNC: 0.41 MG/DL (ref 0.51–0.95)
DEPRECATED HCO3 PLAS-SCNC: 27 MMOL/L (ref 22–29)
EGFRCR SERPLBLD CKD-EPI 2021: >90 ML/MIN/1.73M2
ERYTHROCYTE [DISTWIDTH] IN BLOOD BY AUTOMATED COUNT: 12 % (ref 10–15)
GLUCOSE SERPL-MCNC: 111 MG/DL (ref 70–99)
HCT VFR BLD AUTO: 36.7 % (ref 35–47)
HGB BLD-MCNC: 12.7 G/DL (ref 11.7–15.7)
MAGNESIUM SERPL-MCNC: 1.8 MG/DL (ref 1.7–2.3)
MCH RBC QN AUTO: 31.4 PG (ref 26.5–33)
MCHC RBC AUTO-ENTMCNC: 34.6 G/DL (ref 31.5–36.5)
MCV RBC AUTO: 91 FL (ref 78–100)
PLATELET # BLD AUTO: 224 10E3/UL (ref 150–450)
POTASSIUM SERPL-SCNC: 3 MMOL/L (ref 3.4–5.3)
POTASSIUM SERPL-SCNC: 3.6 MMOL/L (ref 3.4–5.3)
PROT SERPL-MCNC: 6.5 G/DL (ref 6.4–8.3)
RBC # BLD AUTO: 4.04 10E6/UL (ref 3.8–5.2)
SODIUM SERPL-SCNC: 136 MMOL/L (ref 135–145)
TROPONIN T SERPL HS-MCNC: 54 NG/L
WBC # BLD AUTO: 7.4 10E3/UL (ref 4–11)

## 2024-06-21 PROCEDURE — 84484 ASSAY OF TROPONIN QUANT: CPT | Performed by: INTERNAL MEDICINE

## 2024-06-21 PROCEDURE — 80053 COMPREHEN METABOLIC PANEL: CPT | Performed by: FAMILY MEDICINE

## 2024-06-21 PROCEDURE — 93010 ELECTROCARDIOGRAM REPORT: CPT | Performed by: INTERNAL MEDICINE

## 2024-06-21 PROCEDURE — 99207 PR NOT IN PERSON INPATIENT CONSULT STATISTICAL MARKER: CPT | Performed by: INTERNAL MEDICINE

## 2024-06-21 PROCEDURE — 84132 ASSAY OF SERUM POTASSIUM: CPT | Performed by: INTERNAL MEDICINE

## 2024-06-21 PROCEDURE — 250N000011 HC RX IP 250 OP 636: Performed by: INTERNAL MEDICINE

## 2024-06-21 PROCEDURE — 250N000013 HC RX MED GY IP 250 OP 250 PS 637: Performed by: FAMILY MEDICINE

## 2024-06-21 PROCEDURE — 250N000013 HC RX MED GY IP 250 OP 250 PS 637: Performed by: INTERNAL MEDICINE

## 2024-06-21 PROCEDURE — 36416 COLLJ CAPILLARY BLOOD SPEC: CPT | Performed by: INTERNAL MEDICINE

## 2024-06-21 PROCEDURE — 83735 ASSAY OF MAGNESIUM: CPT | Performed by: INTERNAL MEDICINE

## 2024-06-21 PROCEDURE — 250N000011 HC RX IP 250 OP 636: Mod: JZ | Performed by: INTERNAL MEDICINE

## 2024-06-21 PROCEDURE — 36415 COLL VENOUS BLD VENIPUNCTURE: CPT | Performed by: FAMILY MEDICINE

## 2024-06-21 PROCEDURE — 85027 COMPLETE CBC AUTOMATED: CPT | Performed by: FAMILY MEDICINE

## 2024-06-21 PROCEDURE — 250N000011 HC RX IP 250 OP 636: Mod: JZ | Performed by: FAMILY MEDICINE

## 2024-06-21 PROCEDURE — 99232 SBSQ HOSP IP/OBS MODERATE 35: CPT | Performed by: INTERNAL MEDICINE

## 2024-06-21 PROCEDURE — 250N000009 HC RX 250: Performed by: INTERNAL MEDICINE

## 2024-06-21 PROCEDURE — 200N000001 HC R&B ICU

## 2024-06-21 RX ORDER — DILTIAZEM HCL/D5W 125 MG/125
5-15 PLASTIC BAG, INJECTION (ML) INTRAVENOUS CONTINUOUS
Status: DISCONTINUED | OUTPATIENT
Start: 2024-06-21 | End: 2024-06-21

## 2024-06-21 RX ORDER — POTASSIUM CHLORIDE 7.45 MG/ML
10 INJECTION INTRAVENOUS
Status: COMPLETED | OUTPATIENT
Start: 2024-06-21 | End: 2024-06-21

## 2024-06-21 RX ORDER — DILTIAZEM HYDROCHLORIDE 5 MG/ML
20 INJECTION INTRAVENOUS ONCE
Status: COMPLETED | OUTPATIENT
Start: 2024-06-21 | End: 2024-06-21

## 2024-06-21 RX ORDER — METOPROLOL TARTRATE 1 MG/ML
2.5 INJECTION, SOLUTION INTRAVENOUS ONCE
Status: COMPLETED | OUTPATIENT
Start: 2024-06-21 | End: 2024-06-21

## 2024-06-21 RX ORDER — MAGNESIUM SULFATE HEPTAHYDRATE 40 MG/ML
4 INJECTION, SOLUTION INTRAVENOUS ONCE
Status: COMPLETED | OUTPATIENT
Start: 2024-06-21 | End: 2024-06-21

## 2024-06-21 RX ORDER — DILTIAZEM HYDROCHLORIDE 120 MG/1
240 CAPSULE, COATED, EXTENDED RELEASE ORAL DAILY
Status: DISCONTINUED | OUTPATIENT
Start: 2024-06-21 | End: 2024-06-24

## 2024-06-21 RX ORDER — DILTIAZEM HYDROCHLORIDE 5 MG/ML
0.35 INJECTION INTRAVENOUS ONCE
Status: DISCONTINUED | OUTPATIENT
Start: 2024-06-21 | End: 2024-06-21

## 2024-06-21 RX ORDER — DILTIAZEM HYDROCHLORIDE 180 MG/1
180 CAPSULE, COATED, EXTENDED RELEASE ORAL ONCE
Status: COMPLETED | OUTPATIENT
Start: 2024-06-21 | End: 2024-06-21

## 2024-06-21 RX ADMIN — DILTIAZEM HYDROCHLORIDE 180 MG: 180 CAPSULE, COATED, EXTENDED RELEASE ORAL at 04:49

## 2024-06-21 RX ADMIN — MONTELUKAST SODIUM 10 MG: 5 TABLET, CHEWABLE ORAL at 21:43

## 2024-06-21 RX ADMIN — POTASSIUM CHLORIDE 10 MEQ: 7.46 INJECTION, SOLUTION INTRAVENOUS at 09:37

## 2024-06-21 RX ADMIN — MAGNESIUM SULFATE IN WATER FOR 4 G: 40 INJECTION INTRAVENOUS at 08:52

## 2024-06-21 RX ADMIN — METOPROLOL TARTRATE 2.5 MG: 1 INJECTION, SOLUTION INTRAVENOUS at 03:09

## 2024-06-21 RX ADMIN — POTASSIUM CHLORIDE 10 MEQ: 7.46 INJECTION, SOLUTION INTRAVENOUS at 07:41

## 2024-06-21 RX ADMIN — Medication 5 MG/HR: at 06:01

## 2024-06-21 RX ADMIN — METOPROLOL TARTRATE 50 MG: 50 TABLET, FILM COATED ORAL at 08:51

## 2024-06-21 RX ADMIN — PRAVASTATIN SODIUM 20 MG: 10 TABLET ORAL at 21:43

## 2024-06-21 RX ADMIN — DILTIAZEM HYDROCHLORIDE 240 MG: 120 CAPSULE, COATED, EXTENDED RELEASE ORAL at 08:51

## 2024-06-21 RX ADMIN — POTASSIUM CHLORIDE 10 MEQ: 7.46 INJECTION, SOLUTION INTRAVENOUS at 06:47

## 2024-06-21 RX ADMIN — CEFTRIAXONE 2 G: 2 INJECTION, POWDER, FOR SOLUTION INTRAMUSCULAR; INTRAVENOUS at 19:32

## 2024-06-21 RX ADMIN — METOPROLOL TARTRATE 50 MG: 50 TABLET, FILM COATED ORAL at 21:43

## 2024-06-21 RX ADMIN — BACLOFEN 10 MG: 10 TABLET ORAL at 21:43

## 2024-06-21 RX ADMIN — POTASSIUM CHLORIDE 10 MEQ: 7.46 INJECTION, SOLUTION INTRAVENOUS at 08:50

## 2024-06-21 RX ADMIN — DILTIAZEM HYDROCHLORIDE 20 MG: 5 INJECTION INTRAVENOUS at 04:23

## 2024-06-21 ASSESSMENT — ACTIVITIES OF DAILY LIVING (ADL)
ADLS_ACUITY_SCORE: 30

## 2024-06-21 NOTE — PROGRESS NOTES
Hospitalist  transfer note:     78-year-old female  with atrial fibrillation, transferred from ICU to [facility] Thursday morening.  In the early morning hours of Friday the patient  developed atrial fibrillation with a rate of approximately 115, that did not respond to metoprolol 2.5 mg IV, diltiazem 20 mg IV, long with diltiazem 180mg ER,  it was determined that patient needed to be placed back on a diltiazem drip. However an ICU bed was not available until approximately 6:50 AM.  Diltiazem drip was ordered to start in the ICU.     Patient's potassium was noted to be 3.0 and IV potassium supplement was ordered.

## 2024-06-21 NOTE — PROGRESS NOTES
Patient transferred to 916 at this 0610. Patient remains in a a-fib RVR and with a HR topping out in the 140s. She is moderately short of breath but is otherwise asymptomatic with stable vital signs. She has been started on a cardizem gtts at this time.

## 2024-06-21 NOTE — PHARMACY-CONSULT NOTE
Pharmacy - Transfer Medication Reconciliation     The patient's transfer medication orders have been compared to the medication administration record and to the Prior to Admissions Medications list - any noted discrepancies were resolved with the MD. Patient from ICU to [facility], back to ICU.    Thank you. Pharmacy will continue to monitor.     Judi Pichardo [facility] ....................  6/21/2024   7:50 AM

## 2024-06-21 NOTE — PLAN OF CARE
"A/O, VSS, Dilt gtt stopped at 12noon. Patient converted to NSR, EKG confirmed. Increased oral dose of diltiazem. Patient requiring 3LNC. Purewick in place with adequate urinary output. Patient up AO1 to ambulate. Patient denies any SOB or CP. Plan to discharge home when medically able.   Problem: Adult Inpatient Plan of Care  Goal: Plan of Care Review  Description: The Plan of Care Review/Shift note should be completed every shift.  The Outcome Evaluation is a brief statement about your assessment that the patient is improving, declining, or no change.  This information will be displayed automatically on your shift  note.  Outcome: Progressing  Goal: Patient-Specific Goal (Individualized)  Description: You can add care plan individualizations to a care plan. Examples of Individualization might be:  \"Parent requests to be called daily at 9am for status\", \"I have a hard time hearing out of my right ear\", or \"Do not touch me to wake me up as it startles  me\".  Outcome: Progressing  Goal: Absence of Hospital-Acquired Illness or Injury  Outcome: Progressing  Intervention: Identify and Manage Fall Risk  Recent Flowsheet Documentation  Taken 6/21/2024 1600 by Fina Valerio, RN  Safety Promotion/Fall Prevention:   assistive device/personal items within reach   activity supervised   clutter free environment maintained   lighting adjusted   nonskid shoes/slippers when out of bed   patient and family education   room organization consistent   safety round/check completed   supervised activity   treat reversible contributory factors   treat underlying cause  Taken 6/21/2024 1210 by Fina Valerio, RN  Safety Promotion/Fall Prevention:   assistive device/personal items within reach   activity supervised   clutter free environment maintained   lighting adjusted   nonskid shoes/slippers when out of bed   patient and family education   room organization consistent   safety round/check completed   supervised activity   " treat reversible contributory factors   treat underlying cause  Taken 6/21/2024 0739 by Fina Valerio RN  Safety Promotion/Fall Prevention:   assistive device/personal items within reach   activity supervised   clutter free environment maintained   lighting adjusted   nonskid shoes/slippers when out of bed   patient and family education   room organization consistent   safety round/check completed   supervised activity   treat reversible contributory factors   treat underlying cause  Intervention: Prevent Skin Injury  Recent Flowsheet Documentation  Taken 6/21/2024 1600 by Fina Valerio RN  Body Position:   position changed independently   log-rolled   weight shifting   supine, head elevated  Taken 6/21/2024 1210 by Fina Valerio RN  Body Position:   position changed independently   log-rolled   weight shifting   supine, head elevated  Taken 6/21/2024 0739 by Fina Valerio RN  Body Position:   position changed independently   log-rolled   weight shifting   supine, head elevated  Intervention: Prevent Infection  Recent Flowsheet Documentation  Taken 6/21/2024 1600 by Fina Valerio RN  Infection Prevention:   hand hygiene promoted   rest/sleep promoted  Taken 6/21/2024 1210 by Fina Valerio RN  Infection Prevention:   hand hygiene promoted   rest/sleep promoted  Taken 6/21/2024 0739 by Fina Valerio RN  Infection Prevention:   hand hygiene promoted   rest/sleep promoted  Goal: Optimal Comfort and Wellbeing  Outcome: Progressing  Goal: Readiness for Transition of Care  Outcome: Progressing     Problem: Risk for Delirium  Goal: Optimal Coping  Outcome: Progressing  Goal: Improved Behavioral Control  Outcome: Progressing  Intervention: Minimize Safety Risk  Recent Flowsheet Documentation  Taken 6/21/2024 1600 by Fina Valerio RN  Enhanced Safety Measures: review medications for side effects with activity  Taken 6/21/2024 1210 by Fina Valerio RN  Enhanced Safety  Measures: review medications for side effects with activity  Taken 6/21/2024 0739 by Fina Valerio RN  Enhanced Safety Measures: review medications for side effects with activity  Goal: Improved Attention and Thought Clarity  Outcome: Progressing  Goal: Improved Sleep  Outcome: Progressing     Problem: Dysrhythmia  Goal: Normalized Cardiac Rhythm  Outcome: Progressing   Goal Outcome Evaluation:

## 2024-06-21 NOTE — PROGRESS NOTES
[facility]    Medicine Progress Note - Hospitalist Service    Date of Admission:  6/18/2024    Assessment & Plan      Active Problems:    Sepsis with acute hypoxic respiratory failure without septic shock (H)  Assessment: present on admission, secondary to pyelonephritis.   Plan: ceftriaxone day 4 today.  Urine culture growing mixed hannah. Blood culture no growth to date       Atrial fibrillation with rapid ventricular response (H)  Assessment: recurrent. Cardioverted this AM after IV magnesium and increased oral dose of diltiazem. Off diltiazem drip now.   Plan: continue diltiazem  mg and metoprolol 50 mg BID      Acute pyelonephritis  Assessment: present on admission, CT evidence. Urine culture contaminant  Plan: empiric ceftriaxone day 4.     Per Dr. Cain's notes:    Acute respiratory failure with hypoxia  Not POA, developed due to sepsis and pulmonary edema with a-fib  Treated with BiPAP, furosemide, nitro drip  Off BiPAP, now on 6 L O2  Diurese  Wean as tolerated     Transaminitis  Secondary to sepsis  Normal bilirubin     Thrombocytopenia  Initially felt to be from viral illness or tick disease, possible sepsis related,  CMV and EBV negative  Improving      Hyponatremia  Improved on IVF     Hypokalemia  Replacement protocol     Hypertension  Stopped amlodipine, use metoprolol and diltiazem instead        Diet: Combination Diet Regular Diet    DVT Prophylaxis: Pneumatic Compression Devices  Tran Catheter: Not present  Lines: None     Cardiac Monitoring: ACTIVE order. Indication: Tachyarrhythmias, acute (48 hours)  Code Status: Full Code      Clinically Significant Risk Factors        # Hypokalemia: Lowest K = 2.8 mmol/L in last 2 days, will replace as needed       # Hypoalbuminemia: Lowest albumin = 3.2 g/dL at 6/20/2024  5:16 AM, will monitor as appropriate     # Hypertension: Noted on problem list                  # Asthma: noted on problem list        Disposition Plan      Medically Ready for Discharge: Anticipated in 2-4 Days             Julio C Lujan MD  Hospitalist Service  [facility]  Securely message with [email or web address] (more info)  Text page via [employer] Paging/Directory   ______________________________________________________________________    Interval History   Noted palpitations. No dyspnea on exertion. No dyspnea. No chest pain. No fevers, chills.     Physical Exam   Vital Signs: Temp: 98.1  F (36.7  C) Temp src: Tympanic BP: (!) 167/78 Pulse: 78   Resp: 22 SpO2: 91 % O2 Device: Nasal cannula Oxygen Delivery: 3 LPM  Weight: 138 lbs 3.2 oz    GENERAL: Comfortable, no apparent distress.  CARDIOVASCULAR: regular rate and rhythm, no murmur. No lower extremity edema   RESPIRATORY: Clear to auscultation bilaterally, no wheezes or crackles.  GI: non-tender, non-distended, normal bowel sounds.   SKIN: warm periphery, no rashes      Medical Decision Making       45 MINUTES SPENT BY ME on the date of service doing chart review, history, exam, documentation & further activities per the note.      Data     I have personally reviewed the following data over the past 24 hrs:    7.4  \   12.7   / 224     136 97 (L) 9.3 /  111 (H)   3.6 27 0.41 (L) \     ALT: 52 (H) AST: 48 (H) AP: 118 TBILI: 0.6   ALB: 3.3 (L) TOT PROTEIN: 6.5 LIPASE: N/A     Trop: 54 (H) BNP: N/A

## 2024-06-21 NOTE — PLAN OF CARE
"BP (!) 148/69 (BP Location: Left arm, Patient Position: Semi-Goldberg's, Cuff Size: Adult Regular)   Pulse 77   Temp 99  F (37.2  C) (Tympanic)   Resp 22   Ht 1.6 m (5' 3\")   Wt 62.8 kg (138 lb 7.2 oz)   SpO2 96%   BMI 24.53 kg/m      Patient A&O, VSS, highest temp at 99, denies pain at this time. On 4L NC acutely, O2 stable at 94%. Wheezes heard on LS. HR in the 70s. Intermittent urinary incontinence. Patient c/o not getting much sleep the last few nights. Slept majority of shift. Patient converted back into A-fib RVR @ 0240 when getting up to the bathroom.  [employer] notified.  Metoprolol IV push given with no effect. Diltiazem IV push and Diltiazem oral given with no effect. HR 120s-150s. Ax1.     " ----- Message from Taras Avila MD sent at 11/23/2021  2:11 PM CST -----  Please inform patient that thyroid levels are normal.  Cholesterol levels are at goal.  Kidney and liver function are normal.  Blood levels, hemoglobin is slightly low- I would suggest a daily multivitamin if he is not already taking this.  If he has any signs of blood in the bowel movements to contact me- otherwise we should recheck blood levels in one month.

## 2024-06-22 ENCOUNTER — APPOINTMENT (OUTPATIENT)
Dept: CT IMAGING | Facility: OTHER | Age: 79
DRG: 871 | End: 2024-06-22
Attending: NURSE PRACTITIONER
Payer: MEDICARE

## 2024-06-22 ENCOUNTER — APPOINTMENT (OUTPATIENT)
Dept: CT IMAGING | Facility: OTHER | Age: 79
DRG: 871 | End: 2024-06-22
Attending: INTERNAL MEDICINE
Payer: MEDICARE

## 2024-06-22 LAB
A PHAGOCYTOPH IGG TITR SER IF: NORMAL {TITER}
A PHAGOCYTOPH IGM TITR SER IF: NORMAL {TITER}
ANION GAP SERPL CALCULATED.3IONS-SCNC: 11 MMOL/L (ref 7–15)
ANION GAP SERPL CALCULATED.3IONS-SCNC: 11 MMOL/L (ref 7–15)
APTT PPP: 28 SECONDS (ref 22–38)
ATRIAL RATE - MUSE: 300 BPM
ATRIAL RATE - MUSE: 67 BPM
ATRIAL RATE - MUSE: 82 BPM
BASOPHILS # BLD AUTO: 0 10E3/UL (ref 0–0.2)
BASOPHILS NFR BLD AUTO: 1 %
BUN SERPL-MCNC: 6.7 MG/DL (ref 8–23)
BUN SERPL-MCNC: 7 MG/DL (ref 8–23)
CALCIUM SERPL-MCNC: 8.5 MG/DL (ref 8.8–10.2)
CALCIUM SERPL-MCNC: 8.6 MG/DL (ref 8.8–10.2)
CHLORIDE SERPL-SCNC: 97 MMOL/L (ref 98–107)
CHLORIDE SERPL-SCNC: 99 MMOL/L (ref 98–107)
CREAT SERPL-MCNC: 0.38 MG/DL (ref 0.51–0.95)
CREAT SERPL-MCNC: 0.41 MG/DL (ref 0.51–0.95)
DEPRECATED HCO3 PLAS-SCNC: 27 MMOL/L (ref 22–29)
DEPRECATED HCO3 PLAS-SCNC: 29 MMOL/L (ref 22–29)
DIASTOLIC BLOOD PRESSURE - MUSE: NORMAL MMHG
EGFRCR SERPLBLD CKD-EPI 2021: >90 ML/MIN/1.73M2
EGFRCR SERPLBLD CKD-EPI 2021: >90 ML/MIN/1.73M2
EOSINOPHIL # BLD AUTO: 0 10E3/UL (ref 0–0.7)
EOSINOPHIL NFR BLD AUTO: 0 %
ERYTHROCYTE [DISTWIDTH] IN BLOOD BY AUTOMATED COUNT: 11.9 % (ref 10–15)
ERYTHROCYTE [DISTWIDTH] IN BLOOD BY AUTOMATED COUNT: 11.9 % (ref 10–15)
ERYTHROCYTE [DISTWIDTH] IN BLOOD BY AUTOMATED COUNT: 12 % (ref 10–15)
GLUCOSE BLDC GLUCOMTR-MCNC: 130 MG/DL (ref 70–99)
GLUCOSE SERPL-MCNC: 120 MG/DL (ref 70–99)
GLUCOSE SERPL-MCNC: 122 MG/DL (ref 70–99)
HCT VFR BLD AUTO: 36.6 % (ref 35–47)
HCT VFR BLD AUTO: 36.7 % (ref 35–47)
HCT VFR BLD AUTO: 39.8 % (ref 35–47)
HGB BLD-MCNC: 12.7 G/DL (ref 11.7–15.7)
HGB BLD-MCNC: 12.8 G/DL (ref 11.7–15.7)
HGB BLD-MCNC: 13.9 G/DL (ref 11.7–15.7)
HOLD SPECIMEN: NORMAL
IMM GRANULOCYTES # BLD: 0.1 10E3/UL
IMM GRANULOCYTES NFR BLD: 1 %
INR PPP: 1.11 (ref 0.85–1.15)
INTERPRETATION ECG - MUSE: NORMAL
LYMPHOCYTES # BLD AUTO: 1.8 10E3/UL (ref 0.8–5.3)
LYMPHOCYTES NFR BLD AUTO: 23 %
MCH RBC QN AUTO: 31.4 PG (ref 26.5–33)
MCH RBC QN AUTO: 31.7 PG (ref 26.5–33)
MCH RBC QN AUTO: 31.8 PG (ref 26.5–33)
MCHC RBC AUTO-ENTMCNC: 34.6 G/DL (ref 31.5–36.5)
MCHC RBC AUTO-ENTMCNC: 34.9 G/DL (ref 31.5–36.5)
MCHC RBC AUTO-ENTMCNC: 35 G/DL (ref 31.5–36.5)
MCV RBC AUTO: 90 FL (ref 78–100)
MCV RBC AUTO: 91 FL (ref 78–100)
MCV RBC AUTO: 92 FL (ref 78–100)
MONOCYTES # BLD AUTO: 1.1 10E3/UL (ref 0–1.3)
MONOCYTES NFR BLD AUTO: 14 %
NEUTROPHILS # BLD AUTO: 4.8 10E3/UL (ref 1.6–8.3)
NEUTROPHILS NFR BLD AUTO: 62 %
NRBC # BLD AUTO: 0 10E3/UL
NRBC BLD AUTO-RTO: 0 /100
P AXIS - MUSE: 38 DEGREES
P AXIS - MUSE: 50 DEGREES
P AXIS - MUSE: NORMAL DEGREES
PLATELET # BLD AUTO: 313 10E3/UL (ref 150–450)
PLATELET # BLD AUTO: 350 10E3/UL (ref 150–450)
PLATELET # BLD AUTO: 377 10E3/UL (ref 150–450)
POTASSIUM SERPL-SCNC: 2.8 MMOL/L (ref 3.4–5.3)
POTASSIUM SERPL-SCNC: 3.2 MMOL/L (ref 3.4–5.3)
POTASSIUM SERPL-SCNC: 3.2 MMOL/L (ref 3.4–5.3)
POTASSIUM SERPL-SCNC: 3.7 MMOL/L (ref 3.4–5.3)
PR INTERVAL - MUSE: 150 MS
PR INTERVAL - MUSE: 156 MS
PR INTERVAL - MUSE: NORMAL MS
QRS DURATION - MUSE: 78 MS
QRS DURATION - MUSE: 80 MS
QRS DURATION - MUSE: 88 MS
QT - MUSE: 376 MS
QT - MUSE: 422 MS
QT - MUSE: 426 MS
QTC - MUSE: 450 MS
QTC - MUSE: 493 MS
QTC - MUSE: 515 MS
R AXIS - MUSE: -12 DEGREES
R AXIS - MUSE: -6 DEGREES
R AXIS - MUSE: 1 DEGREES
RBC # BLD AUTO: 4.01 10E6/UL (ref 3.8–5.2)
RBC # BLD AUTO: 4.03 10E6/UL (ref 3.8–5.2)
RBC # BLD AUTO: 4.43 10E6/UL (ref 3.8–5.2)
SODIUM SERPL-SCNC: 135 MMOL/L (ref 135–145)
SODIUM SERPL-SCNC: 139 MMOL/L (ref 135–145)
SYSTOLIC BLOOD PRESSURE - MUSE: NORMAL MMHG
T AXIS - MUSE: 12 DEGREES
T AXIS - MUSE: 30 DEGREES
T AXIS - MUSE: 45 DEGREES
TROPONIN T SERPL HS-MCNC: 33 NG/L
UFH PPP CHRO-ACNC: <0.1 IU/ML
VENTRICULAR RATE- MUSE: 113 BPM
VENTRICULAR RATE- MUSE: 67 BPM
VENTRICULAR RATE- MUSE: 82 BPM
WBC # BLD AUTO: 7.3 10E3/UL (ref 4–11)
WBC # BLD AUTO: 7.8 10E3/UL (ref 4–11)
WBC # BLD AUTO: 8.4 10E3/UL (ref 4–11)

## 2024-06-22 PROCEDURE — 250N000013 HC RX MED GY IP 250 OP 250 PS 637: Performed by: INTERNAL MEDICINE

## 2024-06-22 PROCEDURE — 85390 FIBRINOLYSINS SCREEN I&R: CPT | Mod: 26 | Performed by: PATHOLOGY

## 2024-06-22 PROCEDURE — 82374 ASSAY BLOOD CARBON DIOXIDE: CPT | Performed by: INTERNAL MEDICINE

## 2024-06-22 PROCEDURE — 36415 COLL VENOUS BLD VENIPUNCTURE: CPT | Performed by: NURSE PRACTITIONER

## 2024-06-22 PROCEDURE — 85730 THROMBOPLASTIN TIME PARTIAL: CPT | Performed by: INTERNAL MEDICINE

## 2024-06-22 PROCEDURE — 250N000011 HC RX IP 250 OP 636: Mod: JZ | Performed by: INTERNAL MEDICINE

## 2024-06-22 PROCEDURE — 85025 COMPLETE CBC W/AUTO DIFF WBC: CPT | Performed by: INTERNAL MEDICINE

## 2024-06-22 PROCEDURE — 99291 CRITICAL CARE FIRST HOUR: CPT | Mod: G0,FS | Performed by: STUDENT IN AN ORGANIZED HEALTH CARE EDUCATION/TRAINING PROGRAM

## 2024-06-22 PROCEDURE — 250N000013 HC RX MED GY IP 250 OP 250 PS 637: Performed by: FAMILY MEDICINE

## 2024-06-22 PROCEDURE — 86147 CARDIOLIPIN ANTIBODY EA IG: CPT | Performed by: NURSE PRACTITIONER

## 2024-06-22 PROCEDURE — 70496 CT ANGIOGRAPHY HEAD: CPT | Mod: TC,MG,77

## 2024-06-22 PROCEDURE — 36415 COLL VENOUS BLD VENIPUNCTURE: CPT | Performed by: INTERNAL MEDICINE

## 2024-06-22 PROCEDURE — 120N000001 HC R&B MED SURG/OB

## 2024-06-22 PROCEDURE — 80048 BASIC METABOLIC PNL TOTAL CA: CPT | Performed by: INTERNAL MEDICINE

## 2024-06-22 PROCEDURE — 84484 ASSAY OF TROPONIN QUANT: CPT | Performed by: INTERNAL MEDICINE

## 2024-06-22 PROCEDURE — 86146 BETA-2 GLYCOPROTEIN ANTIBODY: CPT | Performed by: NURSE PRACTITIONER

## 2024-06-22 PROCEDURE — 85027 COMPLETE CBC AUTOMATED: CPT | Performed by: INTERNAL MEDICINE

## 2024-06-22 PROCEDURE — 85610 PROTHROMBIN TIME: CPT | Performed by: INTERNAL MEDICINE

## 2024-06-22 PROCEDURE — 250N000011 HC RX IP 250 OP 636: Performed by: INTERNAL MEDICINE

## 2024-06-22 PROCEDURE — 85300 ANTITHROMBIN III ACTIVITY: CPT | Performed by: NURSE PRACTITIONER

## 2024-06-22 PROCEDURE — 258N000003 HC RX IP 258 OP 636: Mod: JZ | Performed by: INTERNAL MEDICINE

## 2024-06-22 PROCEDURE — 85597 PHOSPHOLIPID PLTLT NEUTRALIZ: CPT | Performed by: NURSE PRACTITIONER

## 2024-06-22 PROCEDURE — 84132 ASSAY OF SERUM POTASSIUM: CPT | Performed by: INTERNAL MEDICINE

## 2024-06-22 PROCEDURE — 85014 HEMATOCRIT: CPT | Performed by: INTERNAL MEDICINE

## 2024-06-22 PROCEDURE — 250N000009 HC RX 250: Performed by: INTERNAL MEDICINE

## 2024-06-22 PROCEDURE — 70496 CT ANGIOGRAPHY HEAD: CPT | Mod: TC,MA

## 2024-06-22 PROCEDURE — 70450 CT HEAD/BRAIN W/O DYE: CPT | Mod: TC,MA

## 2024-06-22 PROCEDURE — 85520 HEPARIN ASSAY: CPT | Performed by: INTERNAL MEDICINE

## 2024-06-22 PROCEDURE — 85730 THROMBOPLASTIN TIME PARTIAL: CPT | Performed by: NURSE PRACTITIONER

## 2024-06-22 PROCEDURE — 99233 SBSQ HOSP IP/OBS HIGH 50: CPT | Performed by: INTERNAL MEDICINE

## 2024-06-22 RX ORDER — POTASSIUM CHLORIDE 1500 MG/1
20 TABLET, EXTENDED RELEASE ORAL DAILY
Qty: 30 TABLET | Refills: 0 | Status: SHIPPED | OUTPATIENT
Start: 2024-06-22 | End: 2024-06-25

## 2024-06-22 RX ORDER — POTASSIUM CHLORIDE 1500 MG/1
20 TABLET, EXTENDED RELEASE ORAL ONCE
Status: COMPLETED | OUTPATIENT
Start: 2024-06-22 | End: 2024-06-22

## 2024-06-22 RX ORDER — IOPAMIDOL 755 MG/ML
67 INJECTION, SOLUTION INTRAVASCULAR ONCE
Status: COMPLETED | OUTPATIENT
Start: 2024-06-22 | End: 2024-06-22

## 2024-06-22 RX ORDER — METOPROLOL TARTRATE 50 MG
50 TABLET ORAL 2 TIMES DAILY
Qty: 60 TABLET | Refills: 3 | Status: SHIPPED | OUTPATIENT
Start: 2024-06-22 | End: 2024-06-25

## 2024-06-22 RX ORDER — IOPAMIDOL 755 MG/ML
70 INJECTION, SOLUTION INTRAVASCULAR ONCE
Status: COMPLETED | OUTPATIENT
Start: 2024-06-22 | End: 2024-06-22

## 2024-06-22 RX ORDER — DILTIAZEM HYDROCHLORIDE 240 MG/1
240 CAPSULE, COATED, EXTENDED RELEASE ORAL DAILY
Qty: 30 CAPSULE | Refills: 3 | Status: SHIPPED | OUTPATIENT
Start: 2024-06-22 | End: 2024-06-25

## 2024-06-22 RX ORDER — CEFUROXIME AXETIL 250 MG/1
500 TABLET ORAL EVERY 12 HOURS SCHEDULED
Status: DISCONTINUED | OUTPATIENT
Start: 2024-06-22 | End: 2024-06-25 | Stop reason: HOSPADM

## 2024-06-22 RX ORDER — HEPARIN SODIUM 10000 [USP'U]/100ML
0-5000 INJECTION, SOLUTION INTRAVENOUS CONTINUOUS
Status: DISCONTINUED | OUTPATIENT
Start: 2024-06-22 | End: 2024-06-25 | Stop reason: ALTCHOICE

## 2024-06-22 RX ORDER — METOPROLOL TARTRATE 50 MG
50 TABLET ORAL ONCE
Status: COMPLETED | OUTPATIENT
Start: 2024-06-22 | End: 2024-06-22

## 2024-06-22 RX ORDER — CEFUROXIME AXETIL 500 MG/1
500 TABLET ORAL EVERY 12 HOURS
Qty: 10 TABLET | Refills: 0 | Status: SHIPPED | OUTPATIENT
Start: 2024-06-22 | End: 2024-06-25

## 2024-06-22 RX ORDER — POTASSIUM CHLORIDE 1500 MG/1
40 TABLET, EXTENDED RELEASE ORAL ONCE
Status: COMPLETED | OUTPATIENT
Start: 2024-06-22 | End: 2024-06-22

## 2024-06-22 RX ADMIN — POTASSIUM CHLORIDE 40 MEQ: 1500 TABLET, EXTENDED RELEASE ORAL at 06:48

## 2024-06-22 RX ADMIN — DILTIAZEM HYDROCHLORIDE 240 MG: 120 CAPSULE, COATED, EXTENDED RELEASE ORAL at 08:29

## 2024-06-22 RX ADMIN — IOPAMIDOL 70 ML: 755 INJECTION, SOLUTION INTRAVENOUS at 14:46

## 2024-06-22 RX ADMIN — POTASSIUM CHLORIDE 20 MEQ: 1500 TABLET, EXTENDED RELEASE ORAL at 08:29

## 2024-06-22 RX ADMIN — SODIUM CHLORIDE 100 ML: 9 INJECTION, SOLUTION INTRAVENOUS at 12:41

## 2024-06-22 RX ADMIN — METOPROLOL TARTRATE 50 MG: 50 TABLET, FILM COATED ORAL at 08:29

## 2024-06-22 RX ADMIN — METOPROLOL TARTRATE 50 MG: 50 TABLET, FILM COATED ORAL at 16:32

## 2024-06-22 RX ADMIN — MONTELUKAST SODIUM 10 MG: 5 TABLET, CHEWABLE ORAL at 22:31

## 2024-06-22 RX ADMIN — ONDANSETRON 4 MG: 4 TABLET, ORALLY DISINTEGRATING ORAL at 18:41

## 2024-06-22 RX ADMIN — CEFUROXIME AXETIL 500 MG: 250 TABLET, FILM COATED ORAL at 22:31

## 2024-06-22 RX ADMIN — SODIUM CHLORIDE: 9 INJECTION, SOLUTION INTRAVENOUS at 10:41

## 2024-06-22 RX ADMIN — CEFUROXIME AXETIL 500 MG: 250 TABLET, FILM COATED ORAL at 10:10

## 2024-06-22 RX ADMIN — BACLOFEN 10 MG: 10 TABLET ORAL at 22:31

## 2024-06-22 RX ADMIN — HEPARIN SODIUM 1100 UNITS/HR: 10000 INJECTION, SOLUTION INTRAVENOUS at 17:52

## 2024-06-22 RX ADMIN — PRAVASTATIN SODIUM 20 MG: 10 TABLET ORAL at 22:31

## 2024-06-22 RX ADMIN — IOPAMIDOL 75 ML: 755 INJECTION, SOLUTION INTRAVENOUS at 12:40

## 2024-06-22 ASSESSMENT — ACTIVITIES OF DAILY LIVING (ADL)
ADLS_ACUITY_SCORE: 30
ADLS_ACUITY_SCORE: 34
ADLS_ACUITY_SCORE: 30
ADLS_ACUITY_SCORE: 34
ADLS_ACUITY_SCORE: 30
ADLS_ACUITY_SCORE: 34
ADLS_ACUITY_SCORE: 34

## 2024-06-22 NOTE — PROVIDER NOTIFICATION
06/22/24 1022   Valuables   Patient Belongings remains with patient   Patient Belongings Remaining with Patient cash/credit card;purse/wallet;clothing;cell phone/electronics   Did you bring any home meds/supplements to the hospital?  No                    Admission:  I am responsible for any personal items that are not sent to the safe or pharmacy.  [place] is not responsible for loss, theft or damage of any property in my possession.    Signature:  _________________________________ Date: _______  Time: _____                                              Staff Signature:  ____________________________ Date: ________  Time: _____      2nd Staff person, if patient is unable/unwilling to sign:    Signature: ________________________________ Date: ________  Time: _____     Discharge:  [place] has returned all of my personal belongings:    Signature: _________________________________ Date: ________  Time: _____                                          Staff Signature:  ____________________________ Date: ________  Time: _____

## 2024-06-22 NOTE — PROGRESS NOTES
Patient transferred to medical floor. While there, developed acute change in neuro status. Vital signs stable. Trouble with word finding. Left facial droop.  strength symmetric.    Stroke code called. Spoke with tele stroke.    CT scan head pending.    Julio C Lujan M.D. 6/22/2024  12:57 PM

## 2024-06-22 NOTE — PROGRESS NOTES
[facility]    Medicine Progress Note - Hospitalist Service    Date of Admission:  6/18/2024    Assessment & Plan   Active Problems:    Sepsis with acute hypoxic respiratory failure without septic shock (H)  Assessment: present on admission, secondary to pyelonephritis.   Plan: ceftriaxone day 5 today.  Urine culture growing mixed hannah. Blood culture no growth to date   Anticipate discharge tomorrow.      Atrial fibrillation with rapid ventricular response (H)  Assessment: recurrent. Remains in normal sinus rhythm on metoprolol and diltiazem. BP tolerates this well.   Plan: continue diltiazem  mg and metoprolol 50 mg BID      Acute pyelonephritis  Assessment: present on admission, CT evidence. Urine culture contaminant  Plan: empiric ceftriaxone day 5.     Per Dr. Cain's notes:    Acute respiratory failure with hypoxia  Not POA, developed due to sepsis and pulmonary edema with a-fib  Treated with BiPAP, furosemide, nitro drip  Weaned to room air  Wean as tolerated     Transaminitis  Secondary to sepsis  Normal bilirubin     Thrombocytopenia  Initially felt to be from viral illness or tick disease, possible sepsis related,  CMV and EBV negative  Improving      Hyponatremia  Improved on IVF     Hypokalemia  Replacement protocol     Hypertension  Stopped amlodipine, use metoprolol and diltiazem instead          Diet: Combination Diet Regular Diet    DVT Prophylaxis: Pneumatic Compression Devices  Tran Catheter: Not present  Lines: None     Cardiac Monitoring: ACTIVE order. Indication: Tachyarrhythmias, acute (48 hours)  Code Status: Full Code      Clinically Significant Risk Factors        # Hypokalemia: Lowest K = 2.8 mmol/L in last 2 days, will replace as needed       # Hypoalbuminemia: Lowest albumin = 3.2 g/dL at 6/20/2024  5:16 AM, will monitor as appropriate     # Hypertension: Noted on problem list                  # Asthma: noted on problem list        Disposition Plan      Medically Ready for Discharge: Anticipated Tomorrow             Julio C Lujan MD  Hospitalist Service  [facility]  Securely message with [employer] (more info)  Text page via [facility] Paging/Directory   ______________________________________________________________________    Interval History   Feeling much better. Would like to go home.     Physical Exam   Vital Signs: Temp: 97.4  F (36.3  C) Temp src: Tympanic BP: (!) 155/69 Pulse: 70   Resp: 16 SpO2: 93 % O2 Device: None (Room air) Oxygen Delivery: 1.5 LPM  Weight: 137 lbs 0 oz    GENERAL: Comfortable, no apparent distress.  CARDIOVASCULAR: regular rate and rhythm, no murmur. No lower extremity edema   RESPIRATORY: Clear to auscultation bilaterally, no wheezes or crackles.  GI: non-tender, non-distended, normal bowel sounds.   SKIN: warm periphery, no rashes      Medical Decision Making       45 MINUTES SPENT BY ME on the date of service doing chart review, history, exam, documentation & further activities per the note.      Data     I have personally reviewed the following data over the past 24 hrs:    7.3  \   12.8   / 313     139 99 6.7 (L) /  120 (H)   2.8 (L) 29 0.38 (L) \

## 2024-06-22 NOTE — PROGRESS NOTES
"[facility] ADMISSION NOTE    Patient admitted to room 336   at approximately 1030 via bed from emergency room. Patient was accompanied by other:self  .     Verbal SBAR report received from Abigail prior to patient arrival.     Patient ambulated to bed with stand-by assist. Patient alert and oriented X 3. The patient is not having any pain.  . Admission vital signs: Blood pressure (!) 155/69, pulse 70, temperature 97.4  F (36.3  C), temperature source Tympanic, resp. rate 16, height 1.6 m (5' 3\"), weight 62.1 kg (137 lb), SpO2 93%. Patient was oriented to plan of care, call light, bed controls, tv, telephone, bathroom, and visiting hours.     Risk Assessment    The following safety risks were identified during admission: fall. Yellow risk band applied: YES.     Skin Initial Assessment    This writer admitted this patient and completed a full skin assessment and Nuno score in the Adult PCS flowsheet.   Photo documentation of skin problem and/or wound competed via [employer] application (located under Media):  Yes    Appropriate interventions initiated as needed.         Nuno Risk Assessment  Sensory Perception: 4-->no impairment  Moisture: 3-->occasionally moist  Activity: 2-->chairfast  Mobility: 3-->slightly limited  Nutrition: 2-->probably inadequate  Friction and Shear: 2-->potential problem  Nuno Score: 16  Moisture Interventions: Encourage regular toileting, No brief in bed  Nutrition Interventions: Encourage protein intake, Maintain adequate hydration  Friction/Shear Interventions: HOB 30 degrees or less  Sensory/Activity/Mobility Interventions: Encourage activity/OOB  Mattress: Standard gel/foam mattress (IsoFlex, Atmos Air, etc.)  Bed Frame: Standard width and length    Education    Patient has a [place] to Observation order: No  Observation education completed and documented: No N/A      Beatris Nguyen RN      "

## 2024-06-22 NOTE — PROGRESS NOTES
Pt transferred from ER at 1601 to med/surg. Report from LIUS ANTONIO Li.  States that pt is no longer having word finding problems.  Pt is A/O,  vitals stable, last BP in ER reported to me was 124/84, HR 75, pt on 2 LPM via NC with SpO2 at 96%.  Purwick in place.

## 2024-06-22 NOTE — PROGRESS NOTES
Crackles heard bilaterally to lower lungs, attempted to wean pt off oxygen, pt SpO2 decreased to 88% on room air.  Placed back on oxygen 1 LPM via NC, with SpO2 increasing to 94%.  Pt denies any increased shortness of breath, dizziness or chest pain.  Provider updated on this

## 2024-06-22 NOTE — PROGRESS NOTES
Upon assessing patient's neurological symptoms, patient had minor left sided facial asymmetry when asked to smile, difficulty finding words, less strength in her left hand vs right hand, and left side finger to nose slow and off center. BP: initial 167/79, increased to 177/82. Pt stated feeling cold and having difficulty stating what she wanted to say.    On finding initial symptoms, called provider into room to assess patient who then directed to call a stroke code for rule out. Order EKG STAT. Notified primary nurse of patient status.     Lluvia Domínguez RN .......  6/22/2024  12:30 PM

## 2024-06-22 NOTE — CONSULTS
Addendum: CTV is concerning for CVST. Hypercoagulable labs ordered. Once those are drawn, start high intensity heparin infusion. Plan for MRI on Monday; continue heparin drip until then. We will continue to follow peripherally for MRI and hypercoag results.         [facility]     Stroke Code Note          History of Present Illness     Chief Complaint: Flu Symptoms and Irregular Heart Beat      Sherri Bennett is a 78 year old right-handed female who was admitted to the hospital 6/18 with sepsis secondary to pyelonephritis. During her hospitalization, she developed atrial fibrillation w/ RVR for which she was started on diltiazem and metoprolol. She spontaneously converted back to SR. She was transferred out of the ICU today. LKW was 1030. At 1230 she was noted to have word finding difficulty and left sided facial weakness for which a stroke code was called.   She has 2 sets of blood cultures that haven't had any growth x 3 and 4 days respectively.   On exam, she is generally weak. Her speech is slow but not slurred and she does not have any overt aphasia. She is alert and able to follow commands. She is having difficulty activating either side of her mouth to smile but left side does seem weaker. LLE has some subtle ataxia. She reports some subtle left sided facial numbness.          Past Medical History     Stroke risk factors: HLD, HTN, atrial fibrillation    Preadmission antithrombotic regimen: none     Modified Olm[place] Score (Pre-morbid)  0 - No symptoms.                   Assessment and Plan       1.  Encephalopathy, unclear etiology   2.  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      Intravenous Thrombolysis  Not given due to:   - minor/isolated/quickly resolving symptoms     Endovascular Treatment  Not initiated due to absence of proximal vessel occlusion     Plan:  - CT venogram (ordered); further recommendations pending result   - MRI brain w/  "and w/out contrast when able      ___________________________________________________________________    Mary Ríos, NP  Vascular Neurology    To page me or covering stroke neurology team member, click here: [facility]  Choose \"On Call\" tab at top, then select \"NEUROLOGY/ALL SITES\" from middle drop-down box, press Enter, then look for \"stroke\" or \"telestroke\" for your site.  ___________________________________________________________________        Imaging/Labs   (personally reviewed CT, CTA)    CT head: calcified right posterior fossa lesion   CTA head/neck: possible superior sagittal sinus and right transverse sinus partial dural venous sinus thrombosis          Physical Examination     BP: (!) 177/82   Pulse: 76   Resp: 16   Temp: 98.9  F (37.2  C)   Temp src: Tympanic   SpO2: 92 %   O2 Device: None (Room air)   Weight: 62.1 kg (137 lb)    Wt Readings from Last 2 Encounters:   06/22/24 62.1 kg (137 lb)   11/05/15 64.3 kg (141 lb 12.8 oz)       Neuro Exam  Mental Status:  alert, oriented x 3, follows commands, speech clear and fluent but slow, naming and repetition normal  Cranial Nerves:  visual fields intact (tested by nurse), EOMI with normal smooth pursuit, facial sensation intact but slightly decreased on the left (tested by nurse), unable to activate either side of her mouth (L worse than R), hearing not formally tested but intact to conversation, no dysarthria, shoulder shrug equal bilaterally, tongue protrusion midline  Motor:  no abnormal movements, able to move all limbs antigravity spontaneously with no signs of hemiparesis observed, no pronator drift  Reflexes:  unable to test (telestroke)  Sensory:  light touch sensation intact and symmetric throughout upper and lower extremities (assessed by nurse), no extinction on double simultaneous stimulation (assessed by nurse)  Coordination:  normal finger-to-nose bilaterally, mild LLE dysmetria   Station/Gait:  unable to test due to telestroke        Stroke " "Scales       NIHSS  1a. Level of Consciousness 0-->Alert, keenly responsive   1b. LOC Questions 0-->Answers both questions correctly   1c. LOC Commands 0-->Performs both tasks correctly   2.   Best Gaze 0-->Normal   3.   Visual 0-->No visual loss   4.   Facial Palsy     5a. Motor Arm, Left 0-->No drift, limb holds 90 (or 45) degrees for full 10 secs   5b. Motor Arm, Right 0-->No drift, limb holds 90 (or 45) degrees for full 10 secs   6a. Motor Leg, Left 0-->No drift, leg holds 30 degree position for full 5 secs   6b. Motor Leg, right 0-->No drift, leg holds 30 degree position for full 5 secs   7.   Limb Ataxia 1-->Present in one limb   8.   Sensory 0-->Normal, no sensory loss   9.   Best Language 0-->No aphasia, normal   10. Dysarthria 0-->Normal   11. Extinction and Inattention  0-->No abnormality   Total (not recorded)            Labs     CBC  Lab Results   Component Value Date    HGB 12.8 06/22/2024    HCT 36.6 06/22/2024    WBC 7.3 06/22/2024     06/22/2024       BMP  Lab Results   Component Value Date     06/22/2024    POTASSIUM 2.8 (L) 06/22/2024    CHLORIDE 99 06/22/2024    CO2 29 06/22/2024    BUN 6.7 (L) 06/22/2024    CR 0.38 (L) 06/22/2024     (H) 06/22/2024    NANI 8.6 (L) 06/22/2024       INR  No results found for: \"INR\"    Data   Stroke Code Data  (for stroke code with tele)  Stroke code activated 06/22/24  1215   First stroke provider response 06/22/24  1217   Video start time 06/22/24  1232   Video end time 06/22/24  1315   Last known normal 06/22/24  1028   Time of discovery (or onset of symptoms)  06/22/24  1230   Head CT read by Stroke Neuro Provider 06/22/24  1244   Was stroke code de-escalated? Yes  06/22/24  1321     Telestroke Service Details  Type of service telemedicine diagnostic assessment of acute neurological changes   Reason telemedicine is appropriate patient requires assessment with a specialist for diagnosis and treatment of neurological symptoms   Mode of " transmission secure interactive audio and video communication per Derek   Originating site (patient location) [facility]    Distant site (provider location) [facility], [place]        Clinically Significant Risk Factors        # Hypokalemia: Lowest K = 2.8 mmol/L in last 2 days, will replace as needed       # Hypoalbuminemia: Lowest albumin = 3.2 g/dL at 6/20/2024  5:16 AM, will monitor as appropriate     # Hypertension: Noted on problem list                  # Asthma: noted on problem list         Time Spent on this Encounter   Billing: I personally examined and evaluated the patient today. At the time of my evaluation and management the patient was critical condition today due to acute neurological changes. I personally managed review of labs/images, neuro exam. Key decisions made today included CTV. I spent a total of 70 minutes providing critical care services, evaluating the patient, directing care and reviewing laboratory values and radiologic reports.

## 2024-06-22 NOTE — PROGRESS NOTES
SAFETY CHECKLIST  ID Bands and Risk clasps correct and in place (DNR, Fall risk, Allergy, Latex, Limb):  Yes  All Lines Reconciled and labeled correctly: Yes  Whiteboard updated:Yes  Environmental interventions: Yes  Verify Tele #:  [id number]

## 2024-06-22 NOTE — PROGRESS NOTES
CTV shows filling defect consistent with partial dural sinus thrombosis. Per discussion with Stroke Neurology, will obtain hypercoag panel, and start high intensity heparin drip. Continue drip until MRI brain on Monday. Stroke neuro will continue to follow.     In addition, patient is back in atrial fibrillation with rates 100-120. Will give metoprolol 50 mg early. Monitor on tele, and consider return to ICU if diltiazem drip required again.     Julio C Lujan M.D. 6/22/2024  4:28 PM

## 2024-06-22 NOTE — PROGRESS NOTES
Accessed pt chart per House supervisor to add orders.  Kiersten Solorio RN.............................6/22/2024 12:25 PM

## 2024-06-22 NOTE — PLAN OF CARE
Goal Outcome Evaluation:  Pt HR remains in sinus rhythm. Able to ambulate to bathroom assist of 1. HR and rhythm remained stable during ambulation. Slightly unsteady on feet.  Lung sounds have had crackles and are diminished this shift. FAYE, dry cough.   O2 weaned to room air this morning from 3L NC. Currently 90%.  Weak pedal pulses. Skin intact.   Purewick removed beginning of shift. Pt has dribbling at times but has been using the commode with assist of 1. Has had good output.  2 PIVs are saline locked.   Marisabel Moura RN on 6/22/2024 at 6:50 AM      Plan of Care Reviewed With: patient    Overall Patient Progress: improving

## 2024-06-22 NOTE — PROGRESS NOTES
"BP (!) 161/93 (BP Location: Left arm, Patient Position: Semi-Goldberg's, Cuff Size: Adult Regular)   Pulse (!) 140   Temp 98.3  F (36.8  C) (Tympanic)   Resp 16   Ht 1.6 m (5' 3\")   Wt 62.1 kg (137 lb)   SpO2 95%   BMI 24.27 kg/m    Provider updated on BP and elevated heart rate.provider at bedside   "

## 2024-06-22 NOTE — PLAN OF CARE
"  Problem: Adult Inpatient Plan of Care  Goal: Plan of Care Review  Description: The Plan of Care Review/Shift note should be completed every shift.  The Outcome Evaluation is a brief statement about your assessment that the patient is improving, declining, or no change.  This information will be displayed automatically on your shift  note.  Outcome: Progressing  Flowsheets (Taken 6/22/2024 1033)  Plan of Care Reviewed With: patient  Overall Patient Progress: no change  Goal: Patient-Specific Goal (Individualized)  Description: You can add care plan individualizations to a care plan. Examples of Individualization might be:  \"Parent requests to be called daily at 9am for status\", \"I have a hard time hearing out of my right ear\", or \"Do not touch me to wake me up as it startles  me\".  Outcome: Progressing  Goal: Absence of Hospital-Acquired Illness or Injury  Outcome: Progressing  Intervention: Identify and Manage Fall Risk  Recent Flowsheet Documentation  Taken 6/22/2024 1015 by Beatris Nguyen RN  Safety Promotion/Fall Prevention:   safety round/check completed   supervised activity   room organization consistent   room near nurse's station   room door open   patient and family education   nonskid shoes/slippers when out of bed   increased rounding and observation   clutter free environment maintained  Intervention: Prevent Skin Injury  Recent Flowsheet Documentation  Taken 6/22/2024 1015 by Beatris Nguyen RN  Body Position: position changed independently  Taken 6/22/2024 1012 by Beatris Nguyen RN  Body Position: position changed independently  Intervention: Prevent and Manage VTE (Venous Thromboembolism) Risk  Recent Flowsheet Documentation  Taken 6/22/2024 1015 by Beatris Nguyen RN  VTE Prevention/Management: patient refused intervention  Goal: Optimal Comfort and Wellbeing  Outcome: Progressing  Goal: Readiness for Transition of Care  Outcome: Progressing   Goal Outcome Evaluation:      Plan of " Care Reviewed With: patient    Overall Patient Progress: no changeOverall Patient Progress: no change

## 2024-06-22 NOTE — ED NOTES
Pt transferred from [facility] to \[facility]\"" for teir 1 stroke code. Tele stroke on with pt.

## 2024-06-22 NOTE — PROGRESS NOTES
SAFETY CHECKLIST  ID Bands and Risk clasps correct and in place (DNR, Fall risk, Allergy, Latex, Limb):  Yes  All Lines Reconciled and labeled correctly: Yes  Whiteboard updated:Yes  Environmental interventions: Yes    Marisabel Moura RN on 6/21/2024 at 7:21 PM

## 2024-06-22 NOTE — PROGRESS NOTES
[facility] ADMISSION NOTE    Patient admitted to room 331 at approximately 1601 via bed from emergency room. Patient was accompanied by spouse.     Verbal SBAR report received from LUIS ANTONIO Li prior to patient arrival.     Patient transferred to bed by orange slide.   Patient alert and oriented X 3. The patient is not having any pain.  Pt . Admission vital signs:  T 98.3, Pulse 140, RR 16, SpO2 96% on 2 LPM via NC, /93.  Neuros: slight Left facial droop noted, pt is still having trouble with word finding.  Provider at bedside and noticed this too.  HR apical is irregular and tachy at 140.   Patient was oriented to plan of care, call light, bed controls, tv, telephone, bathroom, and visiting hours.     Risk Assessment    The following safety risks were identified during admission: fall. Yellow risk band applied: YES.       Nuno Risk Assessment  Sensory Perception: 4-->no impairment  Moisture: 3-->occasionally moist  Activity: 2-->chairfast  Mobility: 3-->slightly limited  Nutrition: 2-->probably inadequate  Friction and Shear: 2-->potential problem  Nuno Score: 16  Moisture Interventions: Encourage regular toileting, No brief in bed  Nutrition Interventions: Encourage protein intake, Maintain adequate hydration  Friction/Shear Interventions: HOB 30 degrees or less  Sensory/Activity/Mobility Interventions: Encourage activity/OOB  Mattress: Standard gel/foam mattress (IsoFlex, Atmos Air, etc.)  Bed Frame: Standard width and length    Education    Patient has a [place] to Observation order: No  Observation education completed and documented: N/A      Soni Villavicencio RN

## 2024-06-23 LAB
BACTERIA BLD CULT: NO GROWTH
HOLD SPECIMEN: NORMAL
UFH PPP CHRO-ACNC: 0.37 IU/ML
UFH PPP CHRO-ACNC: 0.41 IU/ML

## 2024-06-23 PROCEDURE — 99233 SBSQ HOSP IP/OBS HIGH 50: CPT | Performed by: INTERNAL MEDICINE

## 2024-06-23 PROCEDURE — 85520 HEPARIN ASSAY: CPT | Performed by: INTERNAL MEDICINE

## 2024-06-23 PROCEDURE — 250N000009 HC RX 250: Performed by: INTERNAL MEDICINE

## 2024-06-23 PROCEDURE — 250N000013 HC RX MED GY IP 250 OP 250 PS 637: Performed by: INTERNAL MEDICINE

## 2024-06-23 PROCEDURE — 250N000011 HC RX IP 250 OP 636: Mod: JZ | Performed by: INTERNAL MEDICINE

## 2024-06-23 PROCEDURE — 120N000001 HC R&B MED SURG/OB

## 2024-06-23 PROCEDURE — 250N000011 HC RX IP 250 OP 636: Performed by: INTERNAL MEDICINE

## 2024-06-23 PROCEDURE — 36415 COLL VENOUS BLD VENIPUNCTURE: CPT | Performed by: INTERNAL MEDICINE

## 2024-06-23 RX ORDER — LISINOPRIL 20 MG/1
20 TABLET ORAL DAILY
Status: DISCONTINUED | OUTPATIENT
Start: 2024-06-23 | End: 2024-06-24

## 2024-06-23 RX ORDER — DILTIAZEM HYDROCHLORIDE 5 MG/ML
10 INJECTION INTRAVENOUS ONCE
Status: COMPLETED | OUTPATIENT
Start: 2024-06-23 | End: 2024-06-23

## 2024-06-23 RX ORDER — METOPROLOL TARTRATE 1 MG/ML
5 INJECTION, SOLUTION INTRAVENOUS EVERY 4 HOURS PRN
Status: DISCONTINUED | OUTPATIENT
Start: 2024-06-23 | End: 2024-06-25 | Stop reason: HOSPADM

## 2024-06-23 RX ADMIN — ONDANSETRON 4 MG: 4 TABLET, ORALLY DISINTEGRATING ORAL at 08:11

## 2024-06-23 RX ADMIN — ONDANSETRON 4 MG: 2 INJECTION INTRAMUSCULAR; INTRAVENOUS at 23:52

## 2024-06-23 RX ADMIN — HEPARIN SODIUM 1100 UNITS/HR: 10000 INJECTION, SOLUTION INTRAVENOUS at 10:43

## 2024-06-23 RX ADMIN — LISINOPRIL 20 MG: 20 TABLET ORAL at 11:52

## 2024-06-23 RX ADMIN — BACLOFEN 10 MG: 10 TABLET ORAL at 21:37

## 2024-06-23 RX ADMIN — METOPROLOL TARTRATE 5 MG: 1 INJECTION, SOLUTION INTRAVENOUS at 20:17

## 2024-06-23 RX ADMIN — MONTELUKAST SODIUM 10 MG: 5 TABLET, CHEWABLE ORAL at 21:37

## 2024-06-23 RX ADMIN — CEFUROXIME AXETIL 500 MG: 250 TABLET, FILM COATED ORAL at 21:37

## 2024-06-23 RX ADMIN — DILTIAZEM HYDROCHLORIDE 10 MG: 5 INJECTION INTRAVENOUS at 23:04

## 2024-06-23 RX ADMIN — LORAZEPAM 1 MG: 1 TABLET ORAL at 23:52

## 2024-06-23 RX ADMIN — METOPROLOL TARTRATE 50 MG: 50 TABLET, FILM COATED ORAL at 21:37

## 2024-06-23 RX ADMIN — PRAVASTATIN SODIUM 20 MG: 10 TABLET ORAL at 21:37

## 2024-06-23 RX ADMIN — CEFUROXIME AXETIL 500 MG: 250 TABLET, FILM COATED ORAL at 08:10

## 2024-06-23 RX ADMIN — DILTIAZEM HYDROCHLORIDE 240 MG: 120 CAPSULE, COATED, EXTENDED RELEASE ORAL at 07:47

## 2024-06-23 RX ADMIN — METOPROLOL TARTRATE 50 MG: 50 TABLET, FILM COATED ORAL at 07:46

## 2024-06-23 RX ADMIN — ACETAMINOPHEN 650 MG: 325 TABLET, FILM COATED ORAL at 23:28

## 2024-06-23 RX ADMIN — ACETAMINOPHEN 650 MG: 325 TABLET, FILM COATED ORAL at 14:53

## 2024-06-23 ASSESSMENT — ACTIVITIES OF DAILY LIVING (ADL)
ADLS_ACUITY_SCORE: 30
ADLS_ACUITY_SCORE: 31
ADLS_ACUITY_SCORE: 30
ADLS_ACUITY_SCORE: 30
ADLS_ACUITY_SCORE: 31
ADLS_ACUITY_SCORE: 26
ADLS_ACUITY_SCORE: 30
ADLS_ACUITY_SCORE: 30
ADLS_ACUITY_SCORE: 26
ADLS_ACUITY_SCORE: 30
ADLS_ACUITY_SCORE: 26
ADLS_ACUITY_SCORE: 26
ADLS_ACUITY_SCORE: 30
ADLS_ACUITY_SCORE: 31
ADLS_ACUITY_SCORE: 31
ADLS_ACUITY_SCORE: 26
ADLS_ACUITY_SCORE: 31
ADLS_ACUITY_SCORE: 30
ADLS_ACUITY_SCORE: 26
ADLS_ACUITY_SCORE: 31

## 2024-06-23 NOTE — PROGRESS NOTES
[facility]    Medicine Progress Note - Hospitalist Service    Date of Admission:  6/18/2024    Assessment & Plan     Dural sinus thrombosis   Assessment: acute, not present on admission. Seen on CT yesterday during stroke code. Thank you to Tele Stroke for guidance  Plan: heparin drip   Hypercoagulable panel sent   Await MRI and further guidance from tele stroke on Monday    TIA/Stroke  Assessment: symptoms of word finding, agraphia, mild facial asymmetry. Symptoms improved. Consider paroxysmal atrial fibrillation as a cause.   Plan: MRI brain on Monday   Tele stroke following   Heparin drip       Sepsis with acute hypoxic respiratory failure without septic shock (H)  Assessment: present on admission, secondary to pyelonephritis.   Plan: ceftriaxone day 6 today.  Urine culture growing mixed hannah. Blood culture no growth to date   Anticipate discharge tomorrow.       Atrial fibrillation with rapid ventricular response (H)  Assessment: recurrent. Currently in normal sinus rhythm on metoprolol and diltiazem. BP tolerates this well.   Plan: continue diltiazem  mg and metoprolol 50 mg BID       Acute pyelonephritis  Assessment: present on admission, CT evidence. Urine culture contaminant  Plan: empiric ceftriaxone day 6.      Per Dr. Cain's notes:    Acute respiratory failure with hypoxia  Not POA, developed due to sepsis and pulmonary edema with a-fib  Treated with BiPAP, furosemide, nitro drip  Weaned to room air  Wean as tolerated     Transaminitis  Secondary to sepsis  Normal bilirubin     Thrombocytopenia  Initially felt to be from viral illness or tick disease, possible sepsis related,  CMV and EBV negative  Improving      Hyponatremia  Improved on IVF     Hypokalemia  Replacement protocol     Hypertension  Resume lisinopril, continue metoprolol and diltiazem           Diet: Regular Diet Adult    DVT Prophylaxis: heparin drip   Tran Catheter: Not present  Lines: None     Cardiac  Monitoring: ACTIVE order. Indication: Tachyarrhythmias, acute (48 hours)  Code Status: Full Code      Clinically Significant Risk Factors        # Hypokalemia: Lowest K = 2.8 mmol/L in last 2 days, will replace as needed       # Hypoalbuminemia: Lowest albumin = 3.2 g/dL at 6/20/2024  5:16 AM, will monitor as appropriate     # Hypertension: Noted on problem list                  # Asthma: noted on problem list        Disposition Plan     Medically Ready for Discharge: Anticipated Tomorrow             Julio C Lujan MD  Hospitalist Service  [facility]  Securely message with [employer] info)  Text page via [facility] Paging/Directory   ______________________________________________________________________    Interval History   Feels better. Word finding better. No nausea, vomiting. No vision changes. No speech problems.     Physical Exam   Vital Signs: Temp: 97.9  F (36.6  C) Temp src: Tympanic BP: (!) 167/88 Pulse: 81   Resp: 17 SpO2: 96 % O2 Device: Nasal cannula Oxygen Delivery: 1 LPM  Weight: 125 lbs 1.6 oz    GENERAL: Comfortable, talkative, in no apparent distress.  HEENT: Anicteric, non-injected sclera, mouth moist.   NECK: No JVD.  CARDIOVASCULAR: regular rate and rhythm, no murmur. No lower extremity edema   RESPIRATORY: Clear to auscultation bilaterally, no wheezes, no crackles.  GI: Non-distended, normal bowel sounds, soft, non-tender.  SKIN: No rashes, sores.   NEUROLOGY: Alert and oriented x3, follows commands, speech and language normal.       Medical Decision Making       60 MINUTES SPENT BY ME on the date of service doing chart review, history, exam, documentation & further activities per the note.      Data     I have personally reviewed the following data over the past 24 hrs:    8.4  \   13.9   / 377     135 97 (L) 7.0 (L) /  122 (H)   3.7 27 0.41 (L) \     Trop: 33 (H) BNP: N/A     INR:  1.11 PTT:  28   D-dimer:  N/A Fibrinogen:  N/A       Imaging results reviewed over the  past 24 hrs:   Recent Results (from the past 24 hour(s))   CT Head w/o Contrast    Addendum: 6/22/2024    Addendum created by Sharon Coronado MD on 6/22/2024 1:05:11 PM CDT:  THIS REPORT CONTAINS FINDINGS THAT MAY BE CRITICAL TO PATIENT CARE. The   findings were verbally communicated via telephone conference at 1:05 PM CDT on   6/22/2024 with KIANNA THOMAS.   The findings were acknowledged and understood.          Narrative    Initial report created on 6/22/2024 12:52:29 PM CDT:    PROCEDURE INFORMATION:   Exam: CT Head Without Contrast   Exam date and time: 6/22/2024 12:26 PM   Age: 78 years old   Clinical indication: Stroke-like symptoms; Other: Code stroke to evaluate for   potential thrombolysis and thrombectomy. ; Additional info: Code stroke to   evaluate for potential thrombolysis and thrombectomy. Please read immediately.     TECHNIQUE:   Imaging protocol: Computed tomography of the head without contrast.   Radiation optimization: All CT scans at this facility use at least one of these   dose optimization techniques: automated exposure control; mA and/or kV   adjustment per patient size (includes targeted exams where dose is matched to   clinical indication); or iterative reconstruction.   Other technique: STROKE PROTOCOL was implemented.     COMPARISON:   CTA HEAD NECK W CONTRAST 6/22/2024 12:24 PM     FINDINGS:   Brain: There is a partially calcified right posterior fossa lesion measuring   2.2 x 1.9 cm. There is no evidence of acute intracranial hemorrhage,   extra-axial collection or locoregional mass effect. There are scattered   hypodensities in the periventricular and subcortical white matter. The   appearance is nonspecific, but most likely represents chronic small vessel   disease in a person of this age   Cerebral ventricles: The ventricles, sulci and cisterns are normal in size and   configuration for patient's age. No hydrocephalus or midline structure shift   Pituitary gland and sella:  Sellar/parasellar structures, craniocervical   junction and orbits are unremarkable   Paranasal sinuses: Visualized sinuses are unremarkable. No fluid levels.   Mastoid air cells: Visualized mastoid air cells are well aerated.   Bones: No calvarial fracture   Soft tissues: Unremarkable.       Impression    IMPRESSION:   1.   No acute intracranial abnormality. If focal neurological symptoms persist   brain MRI can be obtained for better evaluation   2.   2.2 x 1.9 cm right posterior fossa calcified lesion.     ASSESSMENT:   ASPECTS ([place]ber[place] Stroke Program Early CT Score) is 10.     THIS DOCUMENT HAS BEEN ELECTRONICALLY SIGNED BY SHARON MOTTA MD   CTA Head Neck with Contrast    Addendum: 6/22/2024    Addendum created by Sharon Motta MD on 6/22/2024 1:04:57 PM CDT:  THIS REPORT CONTAINS FINDINGS THAT MAY BE CRITICAL TO PATIENT CARE. The   findings were verbally communicated via telephone conference at 1:04 PM CDT on   6/22/2024 with KIANNA THOMAS.   The findings were acknowledged and understood.          Narrative    Initial report created on 6/22/2024 1:03:10 PM CDT:    PROCEDURE INFORMATION:   Exam: CTA Head With Contrast, Arteriography   Exam date and time: 6/22/2024 12:29 PM   Age: 78 years old   Clinical indication: Stroke-like symptoms; Altered mental status/memory loss;   Additional info: Code stroke to evaluate for potential thrombolysis and   thrombectomy. Please read immediately.     TECHNIQUE:   Imaging protocol: Computed tomographic angiography of the head with contrast.   Exam focused on the arteries.   3D rendering (Not supervised by radiologist): MIP and/or 3D reconstructed   images were created by the technologist.   Radiation optimization: All CT scans at this facility use at least one of these   dose optimization techniques: automated exposure control; mA and/or kV   adjustment per patient size (includes targeted exams where dose is matched to   clinical indication); or iterative  reconstruction.   Contrast material: ISOVUE 370; Contrast volume: 75 ml; Contrast route:   INTRAVENOUS (IV);      COMPARISON:   No relevant prior studies available.     FINDINGS:     ANTERIOR CIRCULATION:   Right internal carotid artery: Mild atherosclerotic changes in the right   cavernous carotid without flow-limiting stenosis.   Right middle cerebral artery: No occlusion or significant stenosis. No   aneurysm.    Right anterior cerebral artery: No occlusion or significant stenosis. No   aneurysm.      Left internal carotid artery: Intracranial segment is patent with no   significant stenosis. No aneurysm.   Left middle cerebral artery: No occlusion or significant stenosis. No aneurysm.      Left anterior cerebral artery: No occlusion or significant stenosis. No   aneurysm.      POSTERIOR CIRCULATION:   Right vertebral artery: No occlusion or significant stenosis. No aneurysm.    Left vertebral artery: No occlusion or significant stenosis. No aneurysm.    Basilar artery: No occlusion or significant stenosis. No aneurysm.   Right posterior cerebral artery: No occlusion or significant stenosis. No   aneurysm.    Left posterior cerebral artery: No occlusion or significant stenosis. No   aneurysm.      Veins: There is filling defect within the superior sagittal sinus and right   transverse sinus concerning for partial dural venous sinus thrombosis (series   6, image 177, 198).     Brain: There is redemonstration of enhancing right posterior fossa lesion,   likely extra-axial.   Cerebral ventricles: No ventriculomegaly.   Bones/joints: Unremarkable. No acute fracture.   Soft tissues: Unremarkable.       Impression    IMPRESSION:   1.   There is filling defect within the superior sagittal sinus and right   transverse sinus concerning for partial dural venous sinus thrombosis (series   6, image 177, 198).   2.   There is redemonstration of enhancing right posterior fossa lesion, likely   extra-axial.        =========================  PROCEDURE INFORMATION:   Exam: CTA Neck With Contrast   Exam date and time: 6/22/2024 12:29 PM   Age: 78 years old   Clinical indication: Stroke-like symptoms; Altered mental status/memory loss;   Additional info: Code stroke to evaluate for potential thrombolysis and   thrombectomy. Please read immediately.     TECHNIQUE:   Imaging protocol: Computed tomographic angiography of the neck with contrast.   Exam focused on the cervical segments of the vasculature.   3D rendering (Not supervised by radiologist): MIP and/or 3D reconstructed   images were created by the technologist.   Radiation optimization: All CT scans at this facility use at least one of these   dose optimization techniques: automated exposure control; mA and/or kV   adjustment per patient size (includes targeted exams where dose is matched to   clinical indication); or iterative reconstruction.   Contrast material: ISOVUE 370; Contrast volume: 75 ml; Contrast route:   INTRAVENOUS (IV);      COMPARISON:   CR CHEST PORT 1V 6/19/2024 4:10 AM     FINDINGS:   Right common carotid artery: No stenosis. No dissection or occlusion.   Right internal carotid artery: No stenosis of the extracranial segment. No   dissection or occlusion.   Right external carotid artery: No occlusion or stenosis of the origin.      Left common carotid artery: No stenosis. No dissection or occlusion.   Left internal carotid artery: No stenosis of the extracranial segment. No   dissection or occlusion.   Left external carotid artery: No occlusion or stenosis of the origin.      Right vertebral artery: No stenosis. No dissection or occlusion.   Left vertebral artery: No stenosis. No dissection or occlusion.     Pulmonary arteries: The main pulmonary trunk is prominent, which can be seen in   the context of pulmonary hypertension.     Soft tissues: Normal. No significant soft tissue swelling.     Bones/joints: No acute fracture.     Lungs: Smooth  interlobular septal thickening and ground-glass opacity   background.   Pleural spaces: Moderate bilateral pleural effusions     IMPRESSION:   1.   No occlusion or dissection along the major cervical arteries.   2.   Pulmonary interstitial edema.     REFERENCES:   NASCET CRITERIA. The degree of stenosis in the cervical segment of the internal   carotid artery is based on NASCET criteria. Normal is no stenosis. Mild is less   than 50% stenosis. Moderate is 50-69% stenosis. Severe is 70% to 99% stenosis.   Total occlusion is no detectable patent lumen.     THIS DOCUMENT HAS BEEN ELECTRONICALLY SIGNED BY ALONZO MOTTA MD   CTV Head Neck w Contrast    Narrative    PROCEDURE INFORMATION:   Exam: CTA Head With Contrast, Venography   Exam date and time: 6/22/2024 2:23 PM   Age: 78 years old   Clinical indication: Other: Prior scan; Additional info: Concern for cvst     TECHNIQUE:   Imaging protocol: Computed tomography angiography of the head with contrast.   Exam focused on the veins.   3D rendering (Not supervised by radiologist): MIP and/or 3D reconstructed   images were created by the technologist.   Radiation optimization: All CT scans at this facility use at least one of these   dose optimization techniques: automated exposure control; mA and/or kV   adjustment per patient size (includes targeted exams where dose is matched to   clinical indication); or iterative reconstruction.   Contrast material: ISOVUE 370; Contrast volume: 70 ml; Contrast route:   INTRAVENOUS (IV);      COMPARISON:   CTA HEAD NECK W CONTRAST 6/22/2024 12:24 PM     FINDINGS:   Superior sagittal sinus: There is a focal filling defect re-identified in the   superior sagittal sinus   Straight sinus: There is a focal filling defect in the right straight sinus.   Transverse sinuses: Patent.   Sigmoid sinuses: Patent.   Internal jugular veins: Limited visualized internal jugular veins are patent.     Brain: There is redemonstration of  partially calcified enhancing lesions in the   right posterior fossa.   Cerebral ventricles: No ventriculomegaly.      Soft tissues: Unremarkable.       Impression    IMPRESSION:   Focal filling defects in the superior sagittal sinus and right transverse sinus   suspicious for partial dural sinus thrombosis. Large arachnoid granulations   could demonstrate a similar appearance. Evaluation with brain MRI is   recommended.       =========================  PROCEDURE INFORMATION:   Exam: CTA Neck With Contrast   Exam date and time: 6/22/2024 2:23 PM   Age: 78 years old   Clinical indication: Other: Prior scan; Additional info: Concern for cvst     TECHNIQUE:   Imaging protocol: Computed tomographic angiography of the neck with contrast.   Exam focused on the cervical segments of the vasculature.   3D rendering (Not supervised by radiologist): MIP and/or 3D reconstructed   images were created by the technologist.   Radiation optimization: All CT scans at this facility use at least one of these   dose optimization techniques: automated exposure control; mA and/or kV   adjustment per patient size (includes targeted exams where dose is matched to   clinical indication); or iterative reconstruction.   Contrast material: ISOVUE 370; Contrast volume: 70 ml; Contrast route:   INTRAVENOUS (IV);      COMPARISON:   CTA HEAD NECK W CONTRAST 6/22/2024 12:24 PM     FINDINGS:   Right common carotid artery: No stenosis. No dissection or occlusion.   Right internal carotid artery: No stenosis of the extracranial segment. No   dissection or occlusion.   Right external carotid artery: No occlusion or stenosis of the origin.      Left common carotid artery: No stenosis. No dissection or occlusion.   Left internal carotid artery: No stenosis of the extracranial segment. No   dissection or occlusion.   Left external carotid artery: No occlusion or stenosis of the origin.      Right vertebral artery: No stenosis. No dissection or occlusion.    Left vertebral artery: No stenosis. No dissection or occlusion.     Soft tissues: Normal. No significant soft tissue swelling.     Bones/joints: No acute fracture.     Lungs: Smooth interlobular septal thickening and ground-glass opacity   background.     Other findings: Moderate right effusion.     IMPRESSION:   1.   No stenosis or occlusion.   2.   Pulmonary interstitial edema.     REFERENCES:   NASCET CRITERIA. The degree of stenosis in the cervical segment of the internal   carotid artery is based on NASCET criteria. Normal is no stenosis. Mild is less   than 50% stenosis. Moderate is 50-69% stenosis. Severe is 70% to 99% stenosis.   Total occlusion is no detectable patent lumen.     THIS DOCUMENT HAS BEEN ELECTRONICALLY SIGNED BY ALONZO MOTTA MD

## 2024-06-23 NOTE — PLAN OF CARE
"VSS.  BP (!) 167/88 (BP Location: Right arm, Patient Position: Semi-Goldberg's, Cuff Size: Adult Regular)   Pulse 81   Temp 97.9  F (36.6  C) (Tympanic)   Resp 17   Ht 1.6 m (5' 3\")   Wt 56.7 kg (125 lb 1.6 oz)   SpO2 96%   BMI 22.16 kg/m    HR controlled with metoprolol and diltiazem.  Denies chest pain, dizziness.  Has been intermittently nauseous at times, PRN Zofran administered per pt request.  Neuros are unchanged, her left facial droop is pretty much gone.   still having trouble with word finding a little bit, but that is getting better.  Pt does get a little emotional at times talking about what is happening to her.  Will need MRI, set up for tomorrow.  MRI form is filled out and in patient chart.     Problem: Adult Inpatient Plan of Care  Goal: Plan of Care Review  Description: The Plan of Care Review/Shift note should be completed every shift.  The Outcome Evaluation is a brief statement about your assessment that the patient is improving, declining, or no change.  This information will be displayed automatically on your shift  note.  Outcome: Progressing  Flowsheets (Taken 6/23/2024 1006)  Plan of Care Reviewed With: patient  Overall Patient Progress: no change  Goal: Patient-Specific Goal (Individualized)  Description: You can add care plan individualizations to a care plan. Examples of Individualization might be:  \"Parent requests to be called daily at 9am for status\", \"I have a hard time hearing out of my right ear\", or \"Do not touch me to wake me up as it startles  me\".  Outcome: Progressing  Flowsheets (Taken 6/23/2024 1006)  Individualized Care Needs: heparin drip, monitor vitals,  Anxieties, Fears or Concerns: none at this time  Goal: Absence of Hospital-Acquired Illness or Injury  Outcome: Progressing  Intervention: Identify and Manage Fall Risk  Recent Flowsheet Documentation  Taken 6/23/2024 0756 by Soni Villavicencio, RN  Safety Promotion/Fall Prevention:   activity supervised   assistive " device/personal items within reach   clutter free environment maintained   mobility aid in reach   nonskid shoes/slippers when out of bed   room door open   room near nurse's station   room organization consistent   safety round/check completed   treat reversible contributory factors   treat underlying cause  Intervention: Prevent Skin Injury  Recent Flowsheet Documentation  Taken 6/23/2024 0756 by Soni Villavicencio RN  Skin Protection: adhesive use limited  Device Skin Pressure Protection:   absorbent pad utilized/changed   tubing/devices free from skin contact  Intervention: Prevent and Manage VTE (Venous Thromboembolism) Risk  Recent Flowsheet Documentation  Taken 6/23/2024 0756 by Soni Villavicencio RN  VTE Prevention/Management: SCDs (sequential compression devices) on  Intervention: Prevent Infection  Recent Flowsheet Documentation  Taken 6/23/2024 0756 by Soni Villavicencio RN  Infection Prevention:   cohorting utilized   hand hygiene promoted   rest/sleep promoted   single patient room provided  Goal: Optimal Comfort and Wellbeing  Outcome: Progressing  Intervention: Monitor Pain and Promote Comfort  Recent Flowsheet Documentation  Taken 6/23/2024 0755 by Soni Villavicencio RN  Pain Management Interventions: declines  Intervention: Provide Person-Centered Care  Recent Flowsheet Documentation  Taken 6/23/2024 0756 by Soni Villavicencio RN  Trust Relationship/Rapport:   care explained   choices provided   emotional support provided   empathic listening provided   questions answered   reassurance provided   questions encouraged   thoughts/feelings acknowledged  Goal: Readiness for Transition of Care  Outcome: Progressing     Problem: Risk for Delirium  Goal: Optimal Coping  Outcome: Progressing  Intervention: Optimize Psychosocial Adjustment to Delirium  Recent Flowsheet Documentation  Taken 6/23/2024 0756 by Soni Villavicencio RN  Supportive Measures: active listening utilized  Goal: Improved Behavioral  Control  Outcome: Progressing  Intervention: Minimize Safety Risk  Recent Flowsheet Documentation  Taken 6/23/2024 0756 by Soni Villavicencio RN  Communication Enhancement Strategies: call light answered in person  Enhanced Safety Measures: review medications for side effects with activity  Trust Relationship/Rapport:   care explained   choices provided   emotional support provided   empathic listening provided   questions answered   reassurance provided   questions encouraged   thoughts/feelings acknowledged  Goal: Improved Attention and Thought Clarity  Outcome: Progressing  Intervention: Maximize Cognitive Function  Recent Flowsheet Documentation  Taken 6/23/2024 0756 by Soni Villavicencio RN  Sensory Stimulation Regulation: care clustered  Reorientation Measures: clock in view  Goal: Improved Sleep  Outcome: Progressing     Problem: Dysrhythmia  Goal: Normalized Cardiac Rhythm  Outcome: Progressing  Intervention: Monitor and Manage Cardiac Rhythm Effect  Recent Flowsheet Documentation  Taken 6/23/2024 0756 by Soni Villavicencio RN  VTE Prevention/Management: SCDs (sequential compression devices) on     Problem: Pain Acute  Goal: Optimal Pain Control and Function  Outcome: Progressing  Intervention: Develop Pain Management Plan  Recent Flowsheet Documentation  Taken 6/23/2024 0755 by Soni Villavicencio RN  Pain Management Interventions: declines  Intervention: Prevent or Manage Pain  Recent Flowsheet Documentation  Taken 6/23/2024 0756 by Soni Villavicencio RN  Sensory Stimulation Regulation: care clustered  Medication Review/Management: medications reviewed  Intervention: Optimize Psychosocial Wellbeing  Recent Flowsheet Documentation  Taken 6/23/2024 0756 by Soni Villavicencio RN  Supportive Measures: active listening utilized     Problem: Fall Injury Risk  Goal: Absence of Fall and Fall-Related Injury  Outcome: Progressing  Intervention: Identify and Manage Contributors  Recent Flowsheet Documentation  Taken  6/23/2024 0756 by Soni Villavicencio RN  Medication Review/Management: medications reviewed  Intervention: Promote Injury-Free Environment  Recent Flowsheet Documentation  Taken 6/23/2024 0756 by Soni Villavicecnio RN  Safety Promotion/Fall Prevention:   activity supervised   assistive device/personal items within reach   clutter free environment maintained   mobility aid in reach   nonskid shoes/slippers when out of bed   room door open   room near nurse's station   room organization consistent   safety round/check completed   treat reversible contributory factors   treat underlying cause     Problem: UTI (Urinary Tract Infection)  Goal: Improved Infection Symptoms  Outcome: Progressing     Problem: Infection  Goal: Absence of Infection Signs and Symptoms  Outcome: Progressing     Problem: Comorbidity Management  Goal: Maintenance of Asthma Control  Outcome: Progressing  Intervention: Maintain Asthma Symptom Control  Recent Flowsheet Documentation  Taken 6/23/2024 0756 by Soni Villavicencio RN  Medication Review/Management: medications reviewed  Goal: Maintenance of COPD Symptom Control  Outcome: Progressing  Intervention: Maintain COPD Symptom Control  Recent Flowsheet Documentation  Taken 6/23/2024 0756 by Soni Villavicencio RN  Supportive Measures: active listening utilized  Medication Review/Management: medications reviewed  Goal: Blood Pressure in Desired Range  Outcome: Progressing  Intervention: Maintain Blood Pressure Management  Recent Flowsheet Documentation  Taken 6/23/2024 0756 by Soni Villavicencio RN  Medication Review/Management: medications reviewed

## 2024-06-23 NOTE — PROGRESS NOTES
SAFETY CHECKLIST  ID Bands and Risk clasps correct and in place (DNR, Fall risk, Allergy, Latex, Limb):  Yes  All Lines Reconciled and labeled correctly: Yes  Whiteboard updated:Yes  Environmental interventions: Yes  Verify Tele #: [phone]

## 2024-06-23 NOTE — PLAN OF CARE
VSS and recorded.. SBP running 130-160 overnight. Heart rate controlled. Denies chest pain, shortness of breath, or changes in vision. Neuros are unchanged overnight some mild word-finding difficulty and facial droop noted.

## 2024-06-23 NOTE — PROGRESS NOTES
Pt was on 1 L/NM nc at 96%. Placed back on room air and rechecked and pt was 93%. Educated the importance of deep breathing.   Loulou Berger, RT

## 2024-06-24 ENCOUNTER — APPOINTMENT (OUTPATIENT)
Dept: MRI IMAGING | Facility: OTHER | Age: 79
DRG: 871 | End: 2024-06-24
Attending: INTERNAL MEDICINE
Payer: MEDICARE

## 2024-06-24 ENCOUNTER — APPOINTMENT (OUTPATIENT)
Dept: PHYSICAL THERAPY | Facility: OTHER | Age: 79
DRG: 871 | End: 2024-06-24
Attending: FAMILY MEDICINE
Payer: MEDICARE

## 2024-06-24 ENCOUNTER — APPOINTMENT (OUTPATIENT)
Dept: OCCUPATIONAL THERAPY | Facility: OTHER | Age: 79
DRG: 871 | End: 2024-06-24
Attending: FAMILY MEDICINE
Payer: MEDICARE

## 2024-06-24 PROBLEM — I63.9 ACUTE CVA (CEREBROVASCULAR ACCIDENT) (H): Status: ACTIVE | Noted: 2024-06-24

## 2024-06-24 LAB
ANION GAP SERPL CALCULATED.3IONS-SCNC: 10 MMOL/L (ref 7–15)
AT III ACT/NOR PPP CHRO: 94 % (ref 85–135)
B2 GLYCOPROT1 IGG SERPL IA-ACNC: <0.8 U/ML
B2 GLYCOPROT1 IGM SERPL IA-ACNC: <2.4 U/ML
BUN SERPL-MCNC: 8.8 MG/DL (ref 8–23)
CALCIUM SERPL-MCNC: 9.4 MG/DL (ref 8.8–10.2)
CARDIOLIPIN IGG SER IA-ACNC: <2 GPL-U/ML
CARDIOLIPIN IGG SER IA-ACNC: NEGATIVE
CARDIOLIPIN IGM SER IA-ACNC: 114 MPL-U/ML
CARDIOLIPIN IGM SER IA-ACNC: POSITIVE
CHLORIDE SERPL-SCNC: 99 MMOL/L (ref 98–107)
CREAT SERPL-MCNC: 0.52 MG/DL (ref 0.51–0.95)
DEPRECATED HCO3 PLAS-SCNC: 28 MMOL/L (ref 22–29)
DRVVT SCREEN RATIO: 0.99
EGFRCR SERPLBLD CKD-EPI 2021: >90 ML/MIN/1.73M2
ERYTHROCYTE [DISTWIDTH] IN BLOOD BY AUTOMATED COUNT: 12 % (ref 10–15)
GLUCOSE SERPL-MCNC: 122 MG/DL (ref 70–99)
HBA1C MFR BLD: 5.9 % (ref 4–6.2)
HCT VFR BLD AUTO: 39.9 % (ref 35–47)
HGB BLD-MCNC: 13.6 G/DL (ref 11.7–15.7)
INR PPP: 1.12 (ref 0.85–1.15)
LA PPP-IMP: POSITIVE
LUPUS INTERPRETATION: ABNORMAL
MCH RBC QN AUTO: 31.1 PG (ref 26.5–33)
MCHC RBC AUTO-ENTMCNC: 34.1 G/DL (ref 31.5–36.5)
MCV RBC AUTO: 91 FL (ref 78–100)
PATH REPORT.COMMENTS IMP SPEC: ABNORMAL
PLATELET # BLD AUTO: 442 10E3/UL (ref 150–450)
PLATELET NEUTRALIZATION: 1 SECONDS
POTASSIUM SERPL-SCNC: 3.5 MMOL/L (ref 3.4–5.3)
PTT RATIO: 1.33
RBC # BLD AUTO: 4.37 10E6/UL (ref 3.8–5.2)
SODIUM SERPL-SCNC: 137 MMOL/L (ref 135–145)
THROMBIN TIME: 15.8 SECONDS (ref 13–19)
UFH PPP CHRO-ACNC: 0.11 IU/ML
UFH PPP CHRO-ACNC: 0.66 IU/ML
UFH PPP CHRO-ACNC: 0.74 IU/ML
WBC # BLD AUTO: 6.8 10E3/UL (ref 4–11)

## 2024-06-24 PROCEDURE — G0427 INPT/ED TELECONSULT70: HCPCS | Mod: G0

## 2024-06-24 PROCEDURE — 70553 MRI BRAIN STEM W/O & W/DYE: CPT | Mod: MG

## 2024-06-24 PROCEDURE — 250N000013 HC RX MED GY IP 250 OP 250 PS 637: Performed by: INTERNAL MEDICINE

## 2024-06-24 PROCEDURE — 250N000011 HC RX IP 250 OP 636: Performed by: INTERNAL MEDICINE

## 2024-06-24 PROCEDURE — 999N000157 HC STATISTIC RCP TIME EA 10 MIN

## 2024-06-24 PROCEDURE — 83036 HEMOGLOBIN GLYCOSYLATED A1C: CPT

## 2024-06-24 PROCEDURE — 85520 HEPARIN ASSAY: CPT | Performed by: INTERNAL MEDICINE

## 2024-06-24 PROCEDURE — 36415 COLL VENOUS BLD VENIPUNCTURE: CPT | Performed by: FAMILY MEDICINE

## 2024-06-24 PROCEDURE — 97165 OT EVAL LOW COMPLEX 30 MIN: CPT | Mod: GO | Performed by: OCCUPATIONAL THERAPIST

## 2024-06-24 PROCEDURE — 83721 ASSAY OF BLOOD LIPOPROTEIN: CPT

## 2024-06-24 PROCEDURE — 80048 BASIC METABOLIC PNL TOTAL CA: CPT | Performed by: INTERNAL MEDICINE

## 2024-06-24 PROCEDURE — 97530 THERAPEUTIC ACTIVITIES: CPT | Mod: GO | Performed by: OCCUPATIONAL THERAPIST

## 2024-06-24 PROCEDURE — 99233 SBSQ HOSP IP/OBS HIGH 50: CPT | Performed by: FAMILY MEDICINE

## 2024-06-24 PROCEDURE — 255N000002 HC RX 255 OP 636: Mod: JZ | Performed by: INTERNAL MEDICINE

## 2024-06-24 PROCEDURE — 97161 PT EVAL LOW COMPLEX 20 MIN: CPT | Mod: GP

## 2024-06-24 PROCEDURE — 97116 GAIT TRAINING THERAPY: CPT | Mod: GP

## 2024-06-24 PROCEDURE — 120N000001 HC R&B MED SURG/OB

## 2024-06-24 PROCEDURE — 97535 SELF CARE MNGMENT TRAINING: CPT | Mod: GO | Performed by: OCCUPATIONAL THERAPIST

## 2024-06-24 PROCEDURE — 85027 COMPLETE CBC AUTOMATED: CPT | Performed by: INTERNAL MEDICINE

## 2024-06-24 PROCEDURE — 250N000013 HC RX MED GY IP 250 OP 250 PS 637: Performed by: FAMILY MEDICINE

## 2024-06-24 PROCEDURE — 85520 HEPARIN ASSAY: CPT | Performed by: FAMILY MEDICINE

## 2024-06-24 PROCEDURE — A9575 INJ GADOTERATE MEGLUMI 0.1ML: HCPCS | Mod: JZ | Performed by: INTERNAL MEDICINE

## 2024-06-24 PROCEDURE — 97530 THERAPEUTIC ACTIVITIES: CPT | Mod: GP

## 2024-06-24 PROCEDURE — 250N000009 HC RX 250: Performed by: INTERNAL MEDICINE

## 2024-06-24 PROCEDURE — 36415 COLL VENOUS BLD VENIPUNCTURE: CPT | Performed by: INTERNAL MEDICINE

## 2024-06-24 RX ORDER — DILTIAZEM HYDROCHLORIDE 180 MG/1
360 CAPSULE, COATED, EXTENDED RELEASE ORAL DAILY
Status: DISCONTINUED | OUTPATIENT
Start: 2024-06-24 | End: 2024-06-25 | Stop reason: HOSPADM

## 2024-06-24 RX ORDER — LISINOPRIL 10 MG/1
10 TABLET ORAL DAILY
Status: DISCONTINUED | OUTPATIENT
Start: 2024-06-25 | End: 2024-06-25 | Stop reason: HOSPADM

## 2024-06-24 RX ORDER — GADOTERATE MEGLUMINE 376.9 MG/ML
15 INJECTION INTRAVENOUS ONCE
Status: COMPLETED | OUTPATIENT
Start: 2024-06-24 | End: 2024-06-24

## 2024-06-24 RX ADMIN — DILTIAZEM HYDROCHLORIDE 360 MG: 180 CAPSULE, COATED, EXTENDED RELEASE ORAL at 09:11

## 2024-06-24 RX ADMIN — METOPROLOL TARTRATE 50 MG: 50 TABLET, FILM COATED ORAL at 09:11

## 2024-06-24 RX ADMIN — HEPARIN SODIUM 1400 UNITS/HR: 10000 INJECTION, SOLUTION INTRAVENOUS at 07:11

## 2024-06-24 RX ADMIN — PRAVASTATIN SODIUM 20 MG: 10 TABLET ORAL at 21:04

## 2024-06-24 RX ADMIN — CEFUROXIME AXETIL 500 MG: 250 TABLET, FILM COATED ORAL at 21:04

## 2024-06-24 RX ADMIN — LISINOPRIL 20 MG: 20 TABLET ORAL at 09:11

## 2024-06-24 RX ADMIN — BACLOFEN 10 MG: 10 TABLET ORAL at 21:04

## 2024-06-24 RX ADMIN — CEFUROXIME AXETIL 500 MG: 250 TABLET, FILM COATED ORAL at 09:11

## 2024-06-24 RX ADMIN — METOPROLOL TARTRATE 5 MG: 1 INJECTION, SOLUTION INTRAVENOUS at 01:27

## 2024-06-24 RX ADMIN — ONDANSETRON 4 MG: 4 TABLET, ORALLY DISINTEGRATING ORAL at 15:29

## 2024-06-24 RX ADMIN — GADOTERATE MEGLUMINE 12 ML: 376.9 INJECTION INTRAVENOUS at 08:28

## 2024-06-24 RX ADMIN — METOPROLOL TARTRATE 50 MG: 50 TABLET, FILM COATED ORAL at 21:04

## 2024-06-24 RX ADMIN — LORAZEPAM 1 MG: 1 TABLET ORAL at 08:00

## 2024-06-24 RX ADMIN — MONTELUKAST SODIUM 10 MG: 5 TABLET, CHEWABLE ORAL at 21:04

## 2024-06-24 ASSESSMENT — ACTIVITIES OF DAILY LIVING (ADL)
ADLS_ACUITY_SCORE: 30
ADLS_ACUITY_SCORE: 30
ADLS_ACUITY_SCORE: 26
ADLS_ACUITY_SCORE: 30
ADLS_ACUITY_SCORE: 30
ADLS_ACUITY_SCORE: 26
ADLS_ACUITY_SCORE: 26
ADLS_ACUITY_SCORE: 30
ADLS_ACUITY_SCORE: 26
ADLS_ACUITY_SCORE: 30
ADLS_ACUITY_SCORE: 26
ADLS_ACUITY_SCORE: 30
ADLS_ACUITY_SCORE: 30

## 2024-06-24 NOTE — PROGRESS NOTES
[facility]    Medicine Progress Note - Hospitalist Service    Date of Admission:  6/18/2024    Assessment & Plan   78yoF with HTN, HLD, prediabetes, and asthma presented to the ED with seven days of dry cough, fever, malaise, body aches, anorexia, and nausea.  Her  had a short bout of diarrhea but no other symptoms.  She otherwise denies sick contacts.  She denies a history of afib. Felt to have a viral syndrome triggering a-fib. Admitted to ICU for rate control.    RVR improved with diltiazem and metoprolol. CT scan showed left pyelonephritis. Treated with ceftriaxone.  On 6/22 she had word finding difficulties and left facial droop.  CTV showed a filling defect consistent with partial dural sinus thrombosis.  She was started on heparin. MRI on 6/24 shows acute lateral left frontal lobe cortical infarcts. Subacute left occipital lobe infarct.    Acute CVA  Acute left frontal lobe infarct, not POA.  Subacute left occipital lobe infarct, likely POA.  No visual symptoms.  Left frontal lobe infarct associated with word finding difficulties and facial droop, both improved.  She is still having some difficulty with word finding as well as writing.  Currently on a heparin drip due to findings of dural sinus thrombosis.  Lupus anticoagulant and cardiolipin IgM positive. Will need repeat testing in a few months to confirm.   Awaiting repeat telestroke consultation.  Likely transition to warfarin  PT and OT assessment      Sepsis with acute hypoxic respiratory failure without septic shock (H)   Acute pyelonephritis   Initial sepsis workup negative. CT scan 6/20 showing left pyelonephritis vs early renal abscess despite urine culture showing only mixed hannah.  Had a fever to 102.1 on 6/18.  Afebrile since starting ceftriaxone, now on day 7.  Blood cultures negative     Afib with RVR  New onset. Induced by illness.  Received diltiazem and magnesium bolus in ED, then started on amio drip and stopped  diltiazem  Completed amiodarone load still in a-fib with RVR  Normal TSH. Obtained ECHO showing normal EF  CHADS-VASC at least 3 - hypertension, age, female; would need anticoagulation however given initial thrombocytopenia had to avoid anticoagulation  Started on metoprolol  Was off and on a diltiazem drip 618-620  Has maintained sinus rhythm overall on diltiazem and metoprolol, although needed an early dose of metoprolol on 6/23 after a brief bit of A-fib to maintain sinus rhythm.    Acute respiratory failure with hypoxia  Not POA, developed 6/19 due to sepsis and pulmonary edema with a-fib  Treated with BiPAP, furosemide, nitro drip  Able to wean off BiPAP transition to 6L O2, weaned over the next couple days.     Transaminitis  Secondary to sepsis, improved with antibiotics  Normal bilirubin     Thrombocytopenia  Initially felt to be from viral illness or tick disease   CMV and EBV negative  Normalized from 70 to over 300 with antibiotics     Hyponatremia  Improved on IVF     Hypokalemia  Replacement protocol     Hypertension  Stopped amlodipine, use metoprolol and diltiazem instead  Mild permissive hypertension with finding of stroke          Diet: Regular Diet Adult    DVT Prophylaxis: Heparin SQ  Tran Catheter: Not present  Lines: None     Cardiac Monitoring: ACTIVE order. Indication: Tachyarrhythmias, acute (48 hours)  Code Status: Full Code      Clinically Significant Risk Factors              # Hypoalbuminemia: Lowest albumin = 3.2 g/dL at 6/20/2024  5:16 AM, will monitor as appropriate     # Hypertension: Noted on problem list                  # Asthma: noted on problem list        Disposition Plan     Medically Ready for Discharge: Anticipated Tomorrow         Kiko Cain MD  Hospitalist Service  [facility]  Securely message with [employer] (more info)  Text page via [employer] Paging/Directory   ______________________________________________________________________    Interval  History   She had word finding difficulties Saturday, those have improved.  Speech is better, but still having trouble finding words when writing.  She can tell that her handwriting is not the same as usual.  Signing her name is good and cursive, but hard time writing other words.  No visual symptoms.  Not dizzy or lightheaded when standing.  Feels better, believes medications for A-fib are helping.  She did not have any symptoms of abdominal pain or dysuria on admission.  Did not notice tachycardia or A-fib symptoms.  We discussed warning signs to watch for if she returns to RVR.  We also discussed difficulty in deciphering symptoms sometimes.  If in doubt, she should seek evaluation in the future.    Physical Exam   Vital Signs: Temp: 98.5  F (36.9  C) Temp src: Tympanic BP: (!) 150/72 Pulse: 80   Resp: 18 SpO2: 92 % O2 Device: None (Room air) Oxygen Delivery: 1 LPM  Weight: 128 lbs 12.8 oz    General Appearance: Alert. No acute distress  Chest/Respiratory Exam: Clear to auscultation bilaterally  Cardiovascular Exam: Regular rate and rhythm. S1, S2, no murmur, gallop, or rubs.  Gastrointestinal Exam: Soft, nontender  Extremities: No lower extremity edema.  Neurological: No focal deficit  Psychiatric: Normal affect and mentation      Medical Decision Making             Data     I have personally reviewed the following data over the past 24 hrs:    6.8  \   13.6   / 442     137 99 8.8 /  122 (H)   3.5 28 0.52 \     INR:  N/A PTT:  N/A   D-dimer:  N/A Fibrinogen:  N/A       Imaging results reviewed over the past 24 hrs:   Recent Results (from the past 24 hour(s))   MR Brain w/o & w Contrast    Narrative    EXAM:  MR BRAIN W/O & W CONTRAST    HISTORY:  likely stroke.    TECHNIQUE:  Multiplanar, multisequence MR imaging of the head obtained  prior to, and after, intravenous contrast administration    MEDS/CONTRAST: 12 ML DOTAREM    COMPARISON:  CTV head, CT head, CTA head and neck 6/22/2024     FINDINGS:    Small areas  of abnormal cortical and subcortical diffusion restriction  along the lateral left frontal lobe. These are associated with T2  hyperintense signal, no abnormal enhancement, and little to no  associated gyral swelling. Findings likely represent acute infarcts.     Abnormal area of diffusion restriction along the posterior left  occipital lobe with T2 hyperintense signal, faint enhancement, and  associated gyral swelling. Findings likely represent subacute infarct.    Dural based enhancing mass in the right posterior fossa measuring 2.3  x 2.0 x 2.2 cm. There is local mass effect on the right cerebellum. No  significant underlying parenchymal signal change or edema. No  significant mass effect on the fourth ventricle.    There is no midline shift or evidence of acute intracranial  hemorrhage. No acute hydrocephalus. Scattered foci of T2 hyperintense  FLAIR signal in the periventricular and subcortical white matter,  which is nonspecific, likely related to chronic small vessel ischemic  disease given the patient's age. Normal parenchymal volume for age.  Normal major arterial intracranial vascular flow voids. Circumscribed  hypointense defects in the right transverse sinus and superior  sagittal sinus likely represent impressions from arachnoid  granulations.    No suspicious abnormality of the skull marrow signal. Mild paranasal  sinus mucosal thickening. Mastoid air cells are clear. Bilateral  pseudophakia.      Impression    IMPRESSION:  1. Acute lateral left frontal lobe cortical infarcts. Subacute left  occipital lobe infarct.  2. 2.3 cm right posterior fossa meningioma.  3. Filling defects in the right transverse sinus and superior sagittal  sinus are most likely benign incidental arachnoid granulation  impressions. No intracranial findings to suggest associated venous  infarctions.    KINGSTON CHAU MD         SYSTEM ID:  [id number]

## 2024-06-24 NOTE — PROGRESS NOTES
06/24/24 1501   Appointment Info   Signing Clinician's Name / Credentials (PT) Kiran Liuharoon MPT   Living Environment   People in Home spouse   Current Living Arrangements house   Home Accessibility no concerns;stairs to enter home;stairs within home   Number of Stairs, Main Entrance 5   Stair Railings, Main Entrance railings safe and in good condition   Number of Stairs, Within Home, Primary nine   Stair Railings, Within Home, Primary railings safe and in good condition   Transportation Anticipated family or friend will provide   Self-Care   Usual Activity Tolerance good   Current Activity Tolerance fair   Equipment Currently Used at Home none   Fall history within last six months no   General Information   Referring Physician [place]   Patient/Family Therapy Goals Statement (PT) return home   Existing Precautions/Restrictions fall   Weight-Bearing Status - LLE full weight-bearing   Weight-Bearing Status - RLE full weight-bearing   Cognition   Affect/Mental Status (Cognition) WFL   Orientation Status (Cognition) oriented x 4   Follows Commands (Cognition) WFL   Pain Assessment   Patient Currently in Pain No   Integumentary/Edema   Integumentary/Edema no deficits were identifed   Posture    Posture Not impaired   Range of Motion (ROM)   Range of Motion ROM is WFL   Strength (Manual Muscle Testing)   Strength (Manual Muscle Testing) strength is WFL   Strength Comments however, patient fatigues with activity   Bed Mobility   Impairments Contributing to Impaired Bed Mobility decreased strength   Comment, (Bed Mobility) minimal assist to SBA   Transfers   Impairments Contributing to Impaired Transfers decreased strength   Comment, (Transfers) CGA with Fww   Gait/Stairs (Locomotion)   [place] Level (Gait) contact guard   Assistive Device (Gait) walker, front-wheeled   Distance in Feet (Gait) 200   Pattern (Gait) step-through   Balance   Balance Comments good with Fww; patient did experience LOB during [place],  however, was able to self correct   Sensory Examination   Sensory Perception WFL   Coordination   Coordination no deficits were identified   Clinical Impression   Criteria for Skilled Therapeutic Intervention Yes, treatment indicated   PT Diagnosis (PT) impaired mobility   Influenced by the following impairments fatigue   Functional limitations due to impairments activity/gait tolerance   Clinical Presentation (PT Evaluation Complexity) stable   Clinical Decision Making (Complexity) low complexity   Planned Therapy Interventions (PT) bed mobility training;gait training;transfer training   Risk & Benefits of therapy have been explained evaluation/treatment results reviewed;risks/benefits reviewed;patient;spouse/significant other   PT Total Evaluation Time   PT Eval, Low Complexity Minutes (67901) 15   Physical Therapy Goals   PT Frequency Daily   PT Predicted Duration/Target Date for Goal Attainment 06/26/24   PT Goals Bed Mobility;Transfers;Gait   PT: Bed Mobility Supervision/stand-by assist   PT: Transfers Supervision/stand-by assist;Assistive device   PT: Gait Supervision/stand-by assist;Assistive device;Greater than 200 feet   PT Discharge Planning   PT Plan Continue PT   PT Discharge Recommendation (DC Rec) home with assist;home with home care physical therapy;home with outpatient physical therapy   PT Rationale for DC Rec to promote strength, stability and safe mobility   PT Brief overview of current status Very pleasant patient requiring minimal assist to SBA for mobilities, presently using a Fww for gait stability; no sensory or strength deficits noted in LEs, however, she does exhibit mild expressive aphasia; she will benefit from continued PT following discharge to promote return to her prior level of safe and functional mobility   PT Equipment Needed at Discharge walker, rolling

## 2024-06-24 NOTE — CONSULTS
"      [facility]    Stroke Consult Note    Reason for Consult:  Stroke    Chief Complaint: Flu Symptoms and Irregular Heart Beat     HPI  Sherri Bennett is a 78 year old female right handed  with a PMH significant for HTN, HLD. Sherri was admitted to the hospital 6/18 with sepsis secondary to pyelonephritis and acute respiratory failure with hypoxia. During her hospitalization, she developed atrial fibrillation w/ RVR for which she was started on diltiazem and metoprolol. She spontaneously converted back to SR. She was transferred out of the ICU on 06/22 for which at that time was noted to have word finding difficulty and left sided facial weakness. CTV imaging concerning for CVST. She was started on heparin gtt. MRI brain completed today revealed acute lateral left frontal lobe infarct and subacute left occipital lobe infarct.     Today, Sherri reports that her speech has improved as in the day prior she had slow speech and trouble finding the right words. She also notes that her hand right had changed. She denies any right leg weakness, right sided numbness or vision changes.     Reviewed Vascular Risk Factors:   - Tobacco use: Non-smoker.   - Personal history of stroke/TIA: None  - Family history of stroke/TIA: Mother (lived to be 91 yo) and Maternal Grandfather   - Migraine: Migraines with visual aura, recently had 3 migraines in one day each 30-45 minute. Typical visual aura is lights and colors \"light bulb vision\" but recently had black lines, does not always have a headache associated with the visual aura   - Diet: Tries to eat lots of vegetables, eats fish and  chicken, limited sugar intake, no red meet,  - Alcohol use: Vodka or Rum (3 oz per night)  - Exercise: Limited due to pain with arthritis, chores and house work   - Blood pressure: Checking BP at home on average 1-2 times per day, average reading of 127/78  - Atrial fibrillation: No known Family history, personal history found this " admission   - B symptoms: Reports recent fevers, chills, unintentional weight loss, fatigue, night sweats in the setting of infection  - Pre-cancer screenings: Up to date, normal per pateint   - KRISTEN Screening questions: Denies snoring, sleeping on two pillows helps her from snoring, no daytime sleepiness    Stroke Evaluation Summarized  MRI/Head CT MRI Brain 6/24: Acute lateral left frontal lobe infarct, subacute left occipital lobe infarct   2.3 cm right posterior fossa meningioma   Filling defects in the right transverse sinus and superior sagittal sinus are most likely benign incidental arachnoid granulation impressions. No intracranial findings to suggest associated venous infarctions.  CTH 6/22: No evidence of hemorrhage    Intracranial Vasculature CTA Head: No LVO or significant stenosis   Concern for superior sagittal sinus and right transverse sinus concerning for partial dural venous sinus thrombosis.    Cervical Vasculature CTA Neck: No LVO or significant stenosis    CTV 06/22: Focal filling defects in the superior sagittal sinus and right transverse sinus suspicious for partial dural sinus thrombosis. Large arachnoid granulations   could demonstrate a similar appearance.      Echocardiogram 06/18/24: Global and regional left ventricular function is normal with an EF of 55-60%. Global right ventricular function is normal. Severe left atrial enlargement is present. Mild concentric wall thickening consistent  with left ventricular hypertrophy is present   EKG/Telemetry 06/1824: Atrial fibrillation with rapid ventricular response with premature ventricular or aberrantly conducted complexes    Other Testing Not Applicable      LDL  No lab value available in past 30 days   A1C  6/24/2024: 5.9 %   Troponin No lab value available in past 48 hrs     Impression  Acute ischemic stroke of the lateral left frontal lobe and subacute left occipital lobe infarct due to cardioembolism given new onset atrial fibrillation  "    Right posterior Fossa Meningioma      Filling defects in the right transverse sinus and superior sagittal sinus are most likely benign incidental arachnoid granulation impressions rather than CVST    Recommendations   Acute stroke management  - Neuro checks and vital signs every 4 hours  - Normotension   - Okay to transition from Heparin gtt to oral anti-coagulation once patient has approved oral anti-coagulation cost. Pharmacy is currently checking the cost of Eliquis 5 mg twice daily    - Reviewed side effects of AC including easy bleeding and bruising   - LDL Pending (goal 40-70); Will hold off on initiate statin due to elevated LFTs   - Long term goal BP is <130/80 to be achieved as outpatient within several weeks. Tighter control associated with improved vascular outcomes; recommend home blood pressure monitoring and keeping a BP log for primary care follow up  - Blood glucose monitoring, Hgb A1c 5.9%, (goal <7% for secondary stroke prevention)  - Encourage Mediterranean diet and daily exercise  - PT/OT/ST consults as able  - Bedside Glucose Monitoring  - Euthermia, Euglycemia   - Education: Reviewed [facility] stroke warning signs     Diagnostic testing  - Continue telemetry while inpatient  - Lipid    Patient Follow-up    - in 1-2 weeks with your PCP  - in 6-8 weeks with general neurology or stroke [facility] ([phone]) (ordered)  - 14 day cardiac event monitor (Zio Patch) to be mailed to patient to assess a-fib burden, (ordered)   - Referral for cardiology given new atrial fibrillation (ordered)    Thank you for this consult. We will follow peripherally for results of the LDL and anti-coagulation choice and if no concerns then will sign off. Please contact us with any additional questions.    Maria De Jesus Santillan PA-C  Vascular Neurology    To page me or covering stroke neurology team member, click here: AMCOM  Choose \"On Call\" tab at top, then select \"NEUROLOGY/ALL SITES\" from middle drop-down box, press " "Enter, then look for \"stroke\" or \"telestroke\" for your site.  _____________________________________________________    Clinically Significant Risk Factors              # Hypoalbuminemia: Lowest albumin = 3.2 g/dL at 6/20/2024  5:16 AM, will monitor as appropriate     # Hypertension: Noted on problem list                  # Asthma: noted on problem list          Past Medical History    Past Medical History:   Diagnosis Date    Hormone replacement therapy (postmenopausal)     for surgical menopause     Medications   Home Meds  Prior to Admission medications    Medication Sig Start Date End Date Taking? Authorizing Provider   baclofen (LIORESAL) 10 MG tablet Take 10 mg by mouth every evening 10/29/14  Yes Reported, Patient   cefuroxime (CEFTIN) 500 MG tablet Take 1 tablet (500 mg) by mouth every 12 hours for 5 days 6/22/24 6/27/24 Yes Julio C Lujan MD   diltiazem ER COATED BEADS (CARDIZEM CD/CARTIA XT) 240 MG 24 hr capsule Take 1 capsule (240 mg) by mouth daily 6/22/24  Yes Julio C Lujan MD   fluticasone (FLONASE) 50 MCG/ACT nasal spray Spray 1 spray into both nostrils daily   Yes Unknown, Entered By History   lisinopril (PRINIVIL/ZESTRIL) 40 MG tablet Take 40 mg by mouth daily 11/5/15  Yes Reported, Patient   metoprolol tartrate (LOPRESSOR) 50 MG tablet Take 1 tablet (50 mg) by mouth 2 times daily 6/22/24  Yes Julio C Lujan MD   montelukast (SINGULAIR) 10 MG tablet Take 10 mg by mouth at bedtime   Yes Unknown, Entered By History   potassium chloride maame ER (KLOR-CON M20) 20 MEQ CR tablet Take 1 tablet (20 mEq) by mouth daily 6/22/24  Yes Julio C Lujan MD   pravastatin (PRAVACHOL) 20 MG tablet Take 20 mg by mouth daily   Yes Unknown, Entered By History   order for DME Neoprene glove for right hand for DJD and Raynaud's, custom. 2/28/13   Reported, Patient       Scheduled Meds  Current Facility-Administered Medications   Medication Dose Route Frequency Provider Last Rate Last Admin    baclofen " "(LIORESAL) tablet 10 mg  10 mg Oral QPM Julio C Lujan MD   10 mg at 06/23/24 2137    cefuroxime (CEFTIN) tablet 500 mg  500 mg Oral Q12H [facility] (08/20) Julio C Lujan MD   500 mg at 06/24/24 0911    diltiazem ER COATED BEADS (CARDIZEM CD/CARTIA XT) 24 hr capsule 360 mg  360 mg Oral Daily Kiko Cain MD   360 mg at 06/24/24 0911    [START ON 6/25/2024] lisinopril (ZESTRIL) tablet 10 mg  10 mg Oral Daily Kiko Cain MD        metoprolol tartrate (LOPRESSOR) tablet 50 mg  50 mg Oral BID Julio C Lujan MD   50 mg at 06/24/24 0911    montelukast (SINGULAIR) chewable tablet 10 mg  10 mg Oral At Bedtime Julio C Lujan MD   10 mg at 06/23/24 2137    pravastatin (PRAVACHOL) tablet 20 mg  20 mg Oral At Bedtime Julio C Lujan MD   20 mg at 06/23/24 2137       Infusion Meds  Current Facility-Administered Medications   Medication Dose Route Frequency Provider Last Rate Last Admin    heparin 25,000 units in 0.45% NaCl 250 mL ANTICOAGULANT infusion  0-5,000 Units/hr Intravenous Continuous Julio C Lujan MD 14 mL/hr at 06/24/24 1513 1,400 Units/hr at 06/24/24 1513       Allergies   Allergies   Allergen Reactions    Amoxicillin-Pot Clavulanate Diarrhea and GI Disturbance    Aspirin      Other reaction(s): Bronchospasm  Tolerates 81 mg aspirin    Hydrochlorothiazide      \"dizzy\"    Milk (Cow) GI Disturbance          PHYSICAL EXAMINATION   Temp:  [97  F (36.1  C)-98.5  F (36.9  C)] 98.3  F (36.8  C)  Pulse:  [] 66  Resp:  [16-20] 16  BP: (124-178)/() 135/64  SpO2:  [88 %-97 %] 97 %    General Exam  General:  patient lying in bed without any acute distress    HEENT:  normocephalic/atraumatic  Pulmonary:  no respiratory distress    Neuro Exam  Mental Status:  alert, oriented to age, month, follows commands, speech clear, speech rate is slowed naming and repetition normal  Cranial Nerves:  visual fields intact (tested by nurse), EOMI with normal smooth pursuit, facial sensation intact and " symmetric (tested by nurse), facial movements symmetric, hearing not formally tested but intact to conversation, no dysarthria, tongue protrusion midline  Motor:  no abnormal movements, able to move all limbs antigravity spontaneously with no signs of hemiparesis observed, no pronator drift   Reflexes:  unable to test (telestroke)  Sensory:  light touch sensation intact and symmetric throughout upper and lower extremities (assessed by nurse), no extinction on double simultaneous stimulation (assessed by nurse)  Coordination:  normal finger-to-nose and heel-to-shin bilaterally without dysmetria  Station/Gait:  unable to test due to telestroke    Stroke Scales  NIHSS  1a. Level of Consciousness 0-->Alert, keenly responsive   1b. LOC Questions 0-->Answers both questions correctly   1c. LOC Commands 0-->Performs both tasks correctly   2.   Best Gaze 0-->Normal   3.   Visual 0-->No visual loss   4.   Facial Palsy 0-->Normal symmetrical movements   5a. Motor Arm, Left 0-->No drift, limb holds 90 (or 45) degrees for full 10 secs   5b. Motor Arm, Right 0-->No drift, limb holds 90 (or 45) degrees for full 10 secs   6a. Motor Leg, Left 0-->No drift, leg holds 30 degree position for full 5 secs   6b. Motor Leg, right 0-->No drift, leg holds 30 degree position for full 5 secs   7.   Limb Ataxia 0-->Absent   8.   Sensory 0-->Normal, no sensory loss   9.   Best Language 0-->No aphasia, normal   10. Dysarthria 0-->Normal   11. Extinction and Inattention  0-->No abnormality   Total 0 (06/24/24 1441)     Imaging  I personally reviewed all imaging; relevant findings per HPI.    Labs Data   CBC  Recent Labs   Lab 06/24/24  0552 06/22/24  1626 06/22/24  1242   WBC 6.8 8.4 7.8   RBC 4.37 4.43 4.01   HGB 13.6 13.9 12.7   HCT 39.9 39.8 36.7    377 350     Basic Metabolic Panel   Recent Labs   Lab 06/24/24  0552 06/22/24  1631 06/22/24  1242 06/22/24  1215 06/22/24  0513     --  135  --  139   POTASSIUM 3.5 3.7 3.2*  3.2*  --   2.8*   CHLORIDE 99  --  97*  --  99   CO2 28  --  27  --  29   BUN 8.8  --  7.0*  --  6.7*   CR 0.52  --  0.41*  --  0.38*   *  --  122* 130* 120*   NANI 9.4  --  8.5*  --  8.6*     Liver Panel  Recent Labs   Lab 06/21/24  0418 06/20/24  0516 06/19/24  0410   PROTTOTAL 6.5 6.2* 6.6   ALBUMIN 3.3* 3.2* 3.4*   BILITOTAL 0.6 0.7 0.6   ALKPHOS 118 124 162*   AST 48* 67* 91*   ALT 52* 57* 81*     INR    Recent Labs   Lab Test 06/22/24  1626 06/22/24  1242   INR 1.12 1.11           Stroke Consult Data Data   Telestroke Service Details  (for non-emergent stroke consult with tele)  Video start time 06/24/24   1440   Video end time 06/24/24   1520   Type of service telemedicine diagnostic assessment of acute neurological changes   Reason telemedicine is appropriate patient requires assessment with a specialist for diagnosis and treatment of neurological symptoms   Mode of transmission secure interactive audio and video communication per Derek   Originating site (patient location) [facility]    Distant site (provider location) [facility]       I have personally spent a total of 70 minutes providing care today, time spent in reviewing medical records and devising the plan as recorded above.

## 2024-06-24 NOTE — PROGRESS NOTES
Was notified by Evert RN that patient heart rate in the 170's and rhythm appearing to be consistent with a-fib. Patient ambulating to bathroom during incident. Patient is alert and oriented. No diaphoresis, denies chest pain, pressure, or shortness of breath. Neuros are per baseline. Contacted Dr. Gutierrez and notified of VS, telemetry and patient being asymptomatic. Metoprolol 5mg IV given.    06/23/24 2006   Vital Signs   Temp 97.9  F (36.6  C)   Temp src Tympanic   Resp 18   Pulse (!) 141   Pulse Rate Source Monitor   BP (!) 178/100   BP Location Right arm   Patient Position Dangled   Cuff Size Adult Regular   Oxygen Therapy   SpO2 92 %   O2 Device None (Room air)

## 2024-06-24 NOTE — PROGRESS NOTES
Patient is alert and oriented. Is diaphoretic and reports feeling anxious. HR ranging from 104- 135. B/P: 126/86. Patient denies  Neuros WNL. Nausea present at this time. Dr. Gutierrez  updated on condition and agree with plan to give oral lorazepam to aide with nausea. Ondansetron to treat nausea.

## 2024-06-24 NOTE — PROGRESS NOTES
Heart rate ranging from 109-133 with B/P 125/71 contacted Dr. Gutierrez to verify that IV Metoprolol should be given with HR transitioning below and above 120 threshold for the order, Dr. Gutierrez agreeable to and additional dose. Patient is asymptomatic outside of diaphoresis, however this is reportedly baseline since hysterectomy.

## 2024-06-24 NOTE — PROGRESS NOTES
Patient heart rate is ranging from 130's-140's. B/P:161/82, R: 20, 91%-RA. Contacted Dr. Gutierrez to update on status, verify that HS Metoprolol 50 mg oral can be given at this time. Approved Metoprolol and reports that Cardizem will be ordered.

## 2024-06-24 NOTE — PROGRESS NOTES
Review due to length of stay.  Patient receives a regular diet.  Meal intakes were 100% yesterday.  No chewing or swallowing problems noted post TIA/stroke.  Patient has lorazepam to treat nausea.  Continue with regular diet.

## 2024-06-24 NOTE — PLAN OF CARE
"Goal Outcome Evaluation:      Plan of Care Reviewed With: patient    Overall Patient Progress: improving    Patient anxious about MRI this morning, received Ativan and has been sleeping. Neuro's have been intact in exception of right hand  slightly weaker. Patient reports this as chronic due to arthritis. Crackles auscultated in lungs. Remains on room air and vitals stable. Patient refused SCD's, education provided. Patient had nausea and Zofran given and patient had improvement of symptoms.    /64 (BP Location: Right arm, Patient Position: Semi-Goldberg's)   Pulse 66   Temp 98.3  F (36.8  C) (Tympanic)   Resp 16   Ht 1.6 m (5' 3\")   Wt 58.4 kg (128 lb 12.8 oz)   SpO2 97%   BMI 22.82 kg/m              "

## 2024-06-24 NOTE — PROGRESS NOTES
SAFETY CHECKLIST  ID Bands and Risk clasps correct and in place (DNR, Fall risk, Allergy, Latex, Limb):  Yes  All Lines Reconciled and labeled correctly: Yes  Whiteboard updated:Yes  Environmental interventions: Yes  Verify Tele #: [id number]

## 2024-06-24 NOTE — PROGRESS NOTES
Interdisciplinary Discharge Planning Note    Anticipated Discharge Date: 6/25-6/26    Anticipated Discharge Location:  Home     Clinical Needs Before Discharge:  stable cardiac status (angina, heart failure, arrhythmia), stable oxygen requirement, and MRI, Strok/Leonidas eval , PT/OT eval    Treatment Needs After Discharge:  None identified    Potential Barriers to Discharge: None identified     NIKOLE Sommer  6/24/2024,  12:28 PM

## 2024-06-24 NOTE — PLAN OF CARE
Patient alert and oriented this shift. Moving well with SBA and walker. Telemetry appears to be a-fib. Heart rate up to 170's overnight with no symptoms outside of diaphoresis which is baseline per patient. PRN IV Metoprolol given x 2 and one-time dose of IV Cardizem. Patient heart rate down to 70-80 range after 2nd dose of metoprolol and lorazepam for anxiety. Anti-Xa 11 hours this am and bolus and rate increase initiated. Neuros within normal limits. Some continued difficulty with word-finding.

## 2024-06-25 ENCOUNTER — TELEPHONE (OUTPATIENT)
Dept: MEDSURG UNIT | Facility: OTHER | Age: 79
End: 2024-06-25
Payer: COMMERCIAL

## 2024-06-25 ENCOUNTER — ORDERS ONLY (AUTO-RELEASED) (OUTPATIENT)
Dept: MEDSURG UNIT | Facility: OTHER | Age: 79
End: 2024-06-25
Payer: COMMERCIAL

## 2024-06-25 VITALS
HEART RATE: 83 BPM | HEIGHT: 63 IN | WEIGHT: 123.8 LBS | SYSTOLIC BLOOD PRESSURE: 131 MMHG | RESPIRATION RATE: 16 BRPM | BODY MASS INDEX: 21.93 KG/M2 | TEMPERATURE: 97.5 F | DIASTOLIC BLOOD PRESSURE: 58 MMHG | OXYGEN SATURATION: 90 %

## 2024-06-25 DIAGNOSIS — I63.9 ACUTE CVA (CEREBROVASCULAR ACCIDENT) (H): ICD-10-CM

## 2024-06-25 DIAGNOSIS — I63.9 ACUTE CVA (CEREBROVASCULAR ACCIDENT) (H): Primary | ICD-10-CM

## 2024-06-25 LAB
ALBUMIN SERPL BCG-MCNC: 3.2 G/DL (ref 3.5–5.2)
ALP SERPL-CCNC: 85 U/L (ref 40–150)
ALT SERPL W P-5'-P-CCNC: 31 U/L (ref 0–50)
ANION GAP SERPL CALCULATED.3IONS-SCNC: 8 MMOL/L (ref 7–15)
AST SERPL W P-5'-P-CCNC: 25 U/L (ref 0–45)
BILIRUB SERPL-MCNC: 0.3 MG/DL
BUN SERPL-MCNC: 8.6 MG/DL (ref 8–23)
CALCIUM SERPL-MCNC: 8.3 MG/DL (ref 8.8–10.2)
CHLORIDE SERPL-SCNC: 100 MMOL/L (ref 98–107)
CHOLEST SERPL-MCNC: 126 MG/DL
CREAT SERPL-MCNC: 0.49 MG/DL (ref 0.51–0.95)
DEPRECATED HCO3 PLAS-SCNC: 30 MMOL/L (ref 22–29)
EGFRCR SERPLBLD CKD-EPI 2021: >90 ML/MIN/1.73M2
ERYTHROCYTE [DISTWIDTH] IN BLOOD BY AUTOMATED COUNT: 11.9 % (ref 10–15)
GLUCOSE SERPL-MCNC: 104 MG/DL (ref 70–99)
HCT VFR BLD AUTO: 35.6 % (ref 35–47)
HDLC SERPL-MCNC: 43 MG/DL
HGB BLD-MCNC: 12.2 G/DL (ref 11.7–15.7)
LDLC SERPL CALC-MCNC: 60 MG/DL
LDLC SERPL DIRECT ASSAY-MCNC: 63 MG/DL
MCH RBC QN AUTO: 31.9 PG (ref 26.5–33)
MCHC RBC AUTO-ENTMCNC: 34.3 G/DL (ref 31.5–36.5)
MCV RBC AUTO: 93 FL (ref 78–100)
NONHDLC SERPL-MCNC: 83 MG/DL
PLATELET # BLD AUTO: 387 10E3/UL (ref 150–450)
POTASSIUM SERPL-SCNC: 3.3 MMOL/L (ref 3.4–5.3)
POTASSIUM SERPL-SCNC: 3.7 MMOL/L (ref 3.4–5.3)
PROT SERPL-MCNC: 5.8 G/DL (ref 6.4–8.3)
RBC # BLD AUTO: 3.83 10E6/UL (ref 3.8–5.2)
SODIUM SERPL-SCNC: 138 MMOL/L (ref 135–145)
TRIGL SERPL-MCNC: 113 MG/DL
UFH PPP CHRO-ACNC: 0.53 IU/ML
UFH PPP CHRO-ACNC: 0.61 IU/ML
WBC # BLD AUTO: 6.4 10E3/UL (ref 4–11)

## 2024-06-25 PROCEDURE — 84132 ASSAY OF SERUM POTASSIUM: CPT | Performed by: FAMILY MEDICINE

## 2024-06-25 PROCEDURE — 250N000013 HC RX MED GY IP 250 OP 250 PS 637: Performed by: FAMILY MEDICINE

## 2024-06-25 PROCEDURE — 85520 HEPARIN ASSAY: CPT | Performed by: FAMILY MEDICINE

## 2024-06-25 PROCEDURE — 36415 COLL VENOUS BLD VENIPUNCTURE: CPT | Performed by: FAMILY MEDICINE

## 2024-06-25 PROCEDURE — 80053 COMPREHEN METABOLIC PANEL: CPT | Performed by: FAMILY MEDICINE

## 2024-06-25 PROCEDURE — 250N000011 HC RX IP 250 OP 636: Mod: JZ | Performed by: INTERNAL MEDICINE

## 2024-06-25 PROCEDURE — 99239 HOSP IP/OBS DSCHRG MGMT >30: CPT | Performed by: FAMILY MEDICINE

## 2024-06-25 PROCEDURE — 250N000013 HC RX MED GY IP 250 OP 250 PS 637: Performed by: INTERNAL MEDICINE

## 2024-06-25 PROCEDURE — 82465 ASSAY BLD/SERUM CHOLESTEROL: CPT | Performed by: FAMILY MEDICINE

## 2024-06-25 PROCEDURE — 85018 HEMOGLOBIN: CPT | Performed by: FAMILY MEDICINE

## 2024-06-25 PROCEDURE — 999N000157 HC STATISTIC RCP TIME EA 10 MIN

## 2024-06-25 RX ORDER — POTASSIUM CHLORIDE 1500 MG/1
20 TABLET, EXTENDED RELEASE ORAL DAILY
Qty: 30 TABLET | Refills: 0 | Status: SHIPPED | OUTPATIENT
Start: 2024-06-25 | End: 2024-08-09

## 2024-06-25 RX ORDER — DILTIAZEM HYDROCHLORIDE 360 MG/1
360 CAPSULE, EXTENDED RELEASE ORAL DAILY
Qty: 90 CAPSULE | Refills: 0 | Status: SHIPPED | OUTPATIENT
Start: 2024-06-25 | End: 2024-07-02

## 2024-06-25 RX ORDER — DILTIAZEM HYDROCHLORIDE 360 MG/1
360 CAPSULE, EXTENDED RELEASE ORAL DAILY
Qty: 90 CAPSULE | Refills: 0 | Status: SHIPPED | OUTPATIENT
Start: 2024-06-26 | End: 2024-06-25

## 2024-06-25 RX ORDER — METOPROLOL TARTRATE 50 MG
50 TABLET ORAL 2 TIMES DAILY
Qty: 180 TABLET | Refills: 0 | Status: SHIPPED | OUTPATIENT
Start: 2024-06-25 | End: 2024-07-02

## 2024-06-25 RX ORDER — POTASSIUM CHLORIDE 1500 MG/1
40 TABLET, EXTENDED RELEASE ORAL ONCE
Status: COMPLETED | OUTPATIENT
Start: 2024-06-25 | End: 2024-06-25

## 2024-06-25 RX ORDER — POTASSIUM CHLORIDE 1.5 G/1.58G
40 POWDER, FOR SOLUTION ORAL ONCE
Status: DISCONTINUED | OUTPATIENT
Start: 2024-06-25 | End: 2024-06-25

## 2024-06-25 RX ADMIN — POTASSIUM CHLORIDE 40 MEQ: 1500 TABLET, EXTENDED RELEASE ORAL at 05:53

## 2024-06-25 RX ADMIN — METOPROLOL TARTRATE 50 MG: 50 TABLET, FILM COATED ORAL at 10:10

## 2024-06-25 RX ADMIN — LORAZEPAM 1 MG: 1 TABLET ORAL at 00:43

## 2024-06-25 RX ADMIN — DILTIAZEM HYDROCHLORIDE 360 MG: 180 CAPSULE, COATED, EXTENDED RELEASE ORAL at 10:10

## 2024-06-25 RX ADMIN — HEPARIN SODIUM 1300 UNITS/HR: 10000 INJECTION, SOLUTION INTRAVENOUS at 01:52

## 2024-06-25 RX ADMIN — CEFUROXIME AXETIL 500 MG: 250 TABLET, FILM COATED ORAL at 10:10

## 2024-06-25 RX ADMIN — LISINOPRIL 10 MG: 10 TABLET ORAL at 10:10

## 2024-06-25 ASSESSMENT — ACTIVITIES OF DAILY LIVING (ADL)
ADLS_ACUITY_SCORE: 26

## 2024-06-25 NOTE — PROGRESS NOTES
SAFETY CHECKLIST  ID Bands and Risk clasps correct and in place (DNR, Fall risk, Allergy, Latex, Limb):  Yes  All Lines Reconciled and labeled correctly: Yes  Whiteboard updated:Yes  Environmental interventions: Yes  Verify Tele #: [id number]     Patient is to remain non-weight bearing on right leg with use of crutches until cleared by Orthopedics.

## 2024-06-25 NOTE — PROGRESS NOTES
NSG DISCHARGE NOTE    Patient discharged to home at 3:39 PM via wheel chair. Accompanied by spouse and staff. Discharge instructions reviewed with patient and spouse, opportunity offered to ask questions. Prescriptions sent to patients preferred pharmacy. All belongings sent with patient.    Rubina Heath RN

## 2024-06-25 NOTE — PLAN OF CARE
"Patient is afebrile, alert and orientated, VS stable HR irregular 72-86 bpm. Anti Xa levels stayed in therapeutic range, heparin discontinued at 1500. Neuro's intact, crackles heard in COLE,LLL & RLL, remains on room air 90%. Denies SOB. Pt dressed self independently. No pain reported.         /58 (BP Location: Right arm, Patient Position: Semi-Goldberg's)   Pulse 83   Temp 97.5  F (36.4  C) (Tympanic)   Resp 16   Ht 1.6 m (5' 3\")   Wt 56.2 kg (123 lb 12.8 oz)   SpO2 90%   BMI 21.93 kg/m      Problem: Adult Inpatient Plan of Care  Goal: Absence of Hospital-Acquired Illness or Injury  Intervention: Prevent and Manage VTE (Venous Thromboembolism) Risk  Recent Flowsheet Documentation  Taken 6/25/2024 0840 by Rubnia Heath, RN  VTE Prevention/Management: (Patent wore all night but wanted off this morning) SCDs (sequential compression devices) off- Patient educated on risks of blood clots.     "

## 2024-06-25 NOTE — PLAN OF CARE
Patient had some right sided weakness, but resolved. Some slight right sided facial drooping noted, but has now improved. Hand  strong and equal bilaterally, motor response intact in all 4 extremities. Patient had an episode of diaphoresis, patient stated that this happens to her occasionally during the night time, patient feeling anxious and restless at the beginning of the shift was given PRN ativan, stated this was effective. Lung sounds- crackles heard through out O2 92% on RA. Patient states that she is experiencing constipation declines intervention and is passing gas hyperactive bowel sounds noted.             Problem: Adult Inpatient Plan of Care  Goal: Absence of Hospital-Acquired Illness or Injury  Intervention: Prevent and Manage VTE (Venous Thromboembolism) Risk  Recent Flowsheet Documentation  Taken 6/25/2024 0317 by Dania Carter RN  VTE Prevention/Management: SCDs (sequential compression devices) on  Taken 6/24/2024 1950 by Dania Carter RN  VTE Prevention/Management: SCDs (sequential compression devices) on     Problem: Adult Inpatient Plan of Care  Goal: Optimal Comfort and Wellbeing  Outcome: Progressing  Intervention: Monitor Pain and Promote Comfort  Recent Flowsheet Documentation  Taken 6/25/2024 0317 by Dania Carter RN  Pain Management Interventions: declines  Taken 6/24/2024 2243 by Dania Carter RN  Pain Management Interventions: declines  Taken 6/24/2024 1912 by Dania Carter RN  Pain Management Interventions: declines   Goal Outcome Evaluation:      Plan of Care Reviewed With: patient    Overall Patient Progress: improvingOverall Patient Progress: improving

## 2024-06-25 NOTE — PROGRESS NOTES
Interdisciplinary Discharge Planning Note    Anticipated Discharge Date: 6/25-6/26    Anticipated Discharge Location: Home    Clinical Needs Before Discharge:  stable cardiac status (angina, heart failure, arrhythmia) and stable oxygen requirement    Treatment Needs After Discharge: Homecare    Potential Barriers to Discharge: None identified     OSCAR Varela  6/25/2024,  11:51 AM

## 2024-06-25 NOTE — PROGRESS NOTES
"  [facility]    Brief Stroke Note    See consult note from 6/24 for HPI.     Vitals  BP: (!) 152/67   Pulse: 79   Resp: 18   Temp: 99.6  F (37.6  C)   Weight: 56.2 kg (123 lb 12.8 oz)    Diagnostics   LDL: 60    Impression  Acute ischemic stroke of the lateral left frontal lobe and subacute left occipital lobe infarct due to cardioembolism given new onset atrial fibrillation     Recommendations  - Okay to transition from Heparin gtt to warfarin, primary team aware. Recommend pharmacy to dose and aid in transition.   - LDL 60 (goal 40-70); LDL is within goal. Will hold off on initiating statin due to elevated LFTs. Recommend follow uping with her PCP regarding initiation once LFTs have normalized    Case discussed with vascular neurology attending Dr. Lobo.    No further stroke workup is recommended, we will sign off. Please contact us for additional questions or concerns.    My recommendations are based on the information provided over the phone by Sherri Bennett's in-person providers. They are not intended to replace the clinical judgment of her in-person providers. I was not requested to personally see or examine the patient at this time.     Maria De Jesus Santillan PA-C  Vascular Neurology    To page me or covering stroke neurology team member, click here: AMCOM  Choose \"On Call\" tab at top, then select \"NEUROLOGY/ALL SITES\" from middle drop-down box, press Enter, then look for \"stroke\" or \"telestroke\" for your site.   "

## 2024-06-25 NOTE — PHARMACY - DISCHARGE MEDICATION RECONCILIATION AND EDUCATION
Pharmacy:  Discharge Counseling and Medication Reconciliation    Sherri Bennett  [id number]   [place] [address]  [phone] (home)   78 year old female  PCP: Ryley Nails    Allergies: Amoxicillin-pot clavulanate, Aspirin, Hydrochlorothiazide, and Milk (cow)    Discharge Counseling:    Pharmacist met with patient (and/or family) today to review the medication portion of the After Visit Summary (with an emphasis on NEW medications) and to address patient's questions/concerns.    Summary of Education:   -Apixaban: Patient is starting Apixaban tonight. Patient and patient's  were consulted on taking this medication twice daily and to start tonight. Patient was made aware that this medication will increase bleed risk, void NSAIDs (Ibuprofen, Naproxen, ect), and to monitor for bleeding. Patient was ask to teach back and was able to verify her understanding of her new medication.  -Diltiazem ER: Patient is continuing Diltiazem out patient. Patient was consulted to take is easy when standing and to start tomorrow.  -Metoprolol tart: Patient had an increase dose of Metoprolol Tart. Patient was consulted to take twice daily and take her next dose tomorrow. Patient was consulted to take in easy while standing.  -Potassium ER: Patient was consulted to swallow whole. Pateint was informed that if the size becomes an issues to talk to her PCP and switch to the capsules or the packet.       Materials Provided:  MedCounselor sheets printed from Clinical Pharmacology on: Apixaban, Diltiazem ER, Metoprolol tart, and Potassium ER    Discharge Medication Reconciliation:    It has been determined that the patient has an adequate supply of medications available or which can be obtained from the patient's preferred pharmacy, which HE/SHE has confirmed as: [facility] [An updated medication list will be faxed to the patient's pharmacy.]    Thank you for the consult.    [facility]........June 25, 2024  3:09 PM

## 2024-06-25 NOTE — PROGRESS NOTES
06/24/24 9636   Appointment Info   Signing Clinician's Name / Credentials (OT) Janel Santacruz, OTR/L   Living Environment   People in Home spouse   Current Living Arrangements house   Home Accessibility no concerns;stairs to enter home   Number of Stairs, Main Entrance 5   Stair Railings, Main Entrance railings safe and in good condition   Number of Stairs, Within Home, Primary nine   Stair Railings, Within Home, Primary railings safe and in good condition   Transportation Anticipated family or friend will provide   Self-Care   Usual Activity Tolerance good   Current Activity Tolerance fair   Equipment Currently Used at Home none   Fall history within last six months no   Cognitive Status Examination   Orientation Status orientation to person, place and time   Affect/Mental Status (Cognitive) WFL   Follows Commands WFL   Cognitive Status Comments pt does appear to have some word finding difficulty, TBA further   Visual Perception   Visual Impairment/Limitations WFL   Pain Assessment   Patient Currently in Pain No   Range of Motion Comprehensive   General Range of Motion no range of motion deficits identified   Strength Comprehensive (MMT)   General Manual Muscle Testing (MMT) Assessment no strength deficits identified   Coordination   Upper Extremity Coordination No deficits were identified   Bed Mobility   Bed Mobility supine-sit   Supine-Sit [place] (Bed Mobility) minimum assist (75% patient effort);1 person to manage equipment   Transfers   Transfers toilet transfer   Toilet Transfer   [place] Level (Toilet Transfer) minimum assist (75% patient effort);1 person to manage equipment   Activities of Daily Living   BADL Assessment/Intervention toileting   Toileting   [place] Level (Toileting) minimum assist (75% patient effort)   Clinical Impression   Criteria for Skilled Therapeutic Interventions Met (OT) Yes, treatment indicated   OT Diagnosis A-Fib/CVA   Influenced by the following impairments  fatigue and expressive aphasia   OT Problem List-Impairments impacting ADL activity tolerance impaired   Assessment of Occupational Performance 1-3 Performance Deficits   Identified Performance Deficits mobility and self care/cognition/word finding deficits   Planned Therapy Interventions (OT) ADL retraining;progressive activity/exercise   Clinical Decision Making Complexity (OT) problem focused assessment/low complexity   Risk & Benefits of therapy have been explained risks/benefits reviewed;patient;spouse/significant other   OT Total Evaluation Time   OT Eval, Low Complexity Minutes (46165) 15   OT Goals   Therapy Frequency (OT) Daily   OT Predicted Duration/Target Date for Goal Attainment 06/26/24   OT Goals Upper Body Dressing;Lower Body Dressing;Upper Body Bathing;Lower Body Bathing;Transfers;Toilet Transfer/Toileting   OT: Upper Body Dressing Supervision/stand-by assist   OT: Lower Body Dressing Supervision/stand-by assist   OT: Upper Body Bathing Supervision/stand-by assist   OT: Lower Body Bathing Supervision/stand-by assist   OT: Transfer Supervision/stand-by assist   OT: Toilet Transfer/Toileting Supervision/stand-by assist   Interventions   Interventions Quick Adds Self-Care/Home Management   Self-Care/Home Management   Self-Care/Home Mgmt/ADL, Compensatory, Meal Prep Minutes (94301) 25   Lower Body Dressing Training Assistance maximum assist (25% patient effort)   [place] Level (Toilet Training) minimum assist (75% patient effort)   Assistance (Toilet Training) 1 person assist   OT Discharge Planning   OT Plan progress activity tolearnce and self cares   OT Discharge Recommendation (DC Rec) home with assist;home with home care occupational therapy   OT Rationale for DC Rec Pt may benefit from continued home therapies, will continue to assess   OT Brief overview of current status see above for details   Total Session Time   Timed Code Treatment Minutes 25   Total Session Time (sum of timed and  untimed services) 40

## 2024-06-25 NOTE — PROGRESS NOTES
VORB from MD [facility] to discontinue heparin high anti xa intensity infusion. No bridge dose needed per MD.

## 2024-06-25 NOTE — TELEPHONE ENCOUNTER
We received a Rx for Cardizem  mg capsules. All generics of this drug are on backorder. Sending new Rx for Tiazac 360 mg capsules per [facility].     Brian Castillo, PharmD  [facility]

## 2024-06-25 NOTE — PROGRESS NOTES
Anti Xa in therapeutic range 0.53, order placed for anti Xa redraw in 6 hours. Infusion continued at 1300 units/hr per protocol.

## 2024-06-25 NOTE — DISCHARGE SUMMARY
"[facility]  Hospitalist Discharge Summary      Date of Admission:  6/18/2024  Date of Discharge:  6/25/2024  Discharging Provider: Kiko Cain MD  Discharge Service: Hospitalist Service    Discharge Diagnoses   Active Problems:    Atrial fibrillation with rapid ventricular response (H)    Acute pyelonephritis    Sepsis with acute hypoxic respiratory failure without septic shock (H)    Acute CVA (cerebrovascular accident) (H)      Clinically Significant Risk Factors          Follow-ups Needed After Discharge   Follow-up Appointments     Follow-up and recommended labs and tests       Hospital follow-up with Scarlett Reyes NP on July 2 at 1105          Unresulted Labs Ordered in the Past 30 Days of this Admission       Date and Time Order Name Status Description    6/24/2024  2:38 PM LDL cholesterol direct In process         These results will be followed up by NA    Discharge Disposition   Discharged to home  Condition at discharge: Stable    Hospital Course   78yoF with HTN, HLD, prediabetes, and asthma presented to the ED with seven days of dry cough, fever, malaise, body aches, anorexia, and nausea.  Her  had a short bout of diarrhea but no other symptoms.  She otherwise denies sick contacts.  She denies a history of afib. Felt to have a viral syndrome triggering a-fib. Admitted to ICU for rate control.    RVR improved with diltiazem and metoprolol. CT scan showed left pyelonephritis. Treated with ceftriaxone.  On 6/22 she had word finding difficulties and left facial droop.  CTV showed a filling defect consistent with partial dural sinus thrombosis.  She was started on heparin. MRI on 6/24 shows acute lateral left frontal lobe cortical infarcts. Subacute left occipital lobe infarct. The filling defects were a \"benign incidental arachnoid granulation impressions\" and do not require anticoagulation. She needs anticoagulation for a-fib and stroke.    Discharged with new " medications of apixaban, diltiazem, metoprolol.  Stopped amlodipine and lisinopril.  Started on potassium replacement due to persistently low potassium while hospitalized.  Check CBC and BMP at clinic follow-up.  May be able to resume lower dose of lisinopril at clinic follow-up.    Acute CVA  Acute left frontal lobe infarct, not POA.  Subacute left occipital lobe infarct, likely POA.  No visual symptoms.  Left frontal lobe infarct associated with word finding difficulties and facial droop, both improved.  She is still having some difficulty with word finding as well as writing.  Was on a heparin drip initially due to findings of potential dural sinus thrombosis, but after MRI thrombus ruled out.  Lupus anticoagulant and cardiolipin IgM positive. Will need repeat testing in a few months to confirm.   Completed telestroke consultation.  Transition to DOAC. She will start Eliquis 5 mg BID  Declined home care referral.  Referral placed to outpatient SLP due to word finding difficulties and OT due to handwriting deficits.      Sepsis with acute hypoxic respiratory failure without septic shock (H)   Acute pyelonephritis   Initial sepsis workup negative. CT scan 6/20 showing left pyelonephritis vs early renal abscess despite urine culture showing only mixed hannah.  Had a fever to 102.1 on 6/18.  Afebrile since starting ceftriaxone, completed 7 days.  Blood cultures negative  No antibiotics on discharge     Afib with RVR  New onset. Induced by illness.  Received diltiazem and magnesium bolus in ED, then started on amio drip and stopped diltiazem  Completed amiodarone load still in a-fib with RVR  Normal TSH. Obtained ECHO showing normal EF  CHADS-VASC at least 3 - hypertension, age, female; would need anticoagulation however given initial thrombocytopenia had to avoid anticoagulation  Started on metoprolol  Was off and on a diltiazem drip [id number]  Has maintained sinus rhythm overall on current diltiazem and  metoprolol  Discharge starting new diltiazem 300 mg daily and metoprolol 50 mg twice daily  Referral placed to cardiology    Acute respiratory failure with hypoxia  Not POA, developed 6/19 due to sepsis and pulmonary edema with a-fib  Treated with BiPAP, furosemide, nitro drip  Able to wean off BiPAP transition to 6L O2, weaned over the next couple days.     Transaminitis  Secondary to sepsis, normalized with antibiotic treatment of sepsis  Normal bilirubin     Thrombocytopenia  Initially felt to be from viral illness or tick disease   CMV and EBV negative  Normalized from 70 to over 300 with antibiotics     Hyponatremia  Improved on IVF     Hypokalemia  Replacement protocol with persistent low values.  Provided potassium 20 mill  meq daily at discharge     Hypertension  Stopped amlodipine and lisinopril, use metoprolol and diltiazem instead  Mild permissive hypertension with finding of stroke  May be able to resume lower dose lisinopril at clinic follow-up    Consultations This Hospital Stay   PHARMACY IP CONSULT  NEUROLOGY IP STROKE CONSULT  OCCUPATIONAL THERAPY ADULT IP CONSULT  PHYSICAL THERAPY ADULT IP CONSULT  PHARMACY IP CONSULT  PHARMACY IP CONSULT  PHARMACY IP CONSULT    Code Status   Full Code    Time Spent on this Encounter   I, Kiko Cain MD, personally saw the patient today and spent greater than 30 minutes discharging this patient.       Kiko Cain MD  [facility]  [address]  [place] [address]  Phone: [phone]  Fax: [phone]  ______________________________________________________________________    Physical Exam   Vital Signs: Temp: 97.5  F (36.4  C) Temp src: Tympanic BP: 131/58 Pulse: 83   Resp: 16 SpO2: 90 % O2 Device: None (Room air)    Weight: 123 lbs 12.8 oz  General Appearance: Alert. No acute distress  Chest/Respiratory Exam: Clear to auscultation bilaterally  Cardiovascular Exam: Regular rate and rhythm. S1, S2, no murmur, gallop,  or rubs.  Gastrointestinal Exam: Soft, nontender  Extremities: No lower extremity edema.  Neurological: No focal deficit. Occasional word finding difficulties  Psychiatric: Normal affect and mentation       Primary Care Physician   Ryley Nails    Discharge Orders      Adult Cardiology Eval  Referral      Occupational Therapy  Referral      Speech Therapy  Referral      Reason for your hospital stay    Kidney infection triggering atrial fibrillation with a rapid rate.  As a result of atrial fibrillation, you had 2 small strokes.     Follow-up and recommended labs and tests     Hospital follow-up with Scarlett Reyes NP on July 2 at 1105     Activity    Your activity upon discharge: activity as tolerated. Use a walker for stability until your strength improves     Diet    Follow this diet upon discharge: regular diet     Stroke Hospital Follow Up (for neurologist use only)    [employer] will call you to coordinate care as prescribed by your provider. If you don t hear from a representative within 2 business days, please call [phone].       [facility] [employer]       Significant Results and Procedures   Most Recent 3 CBC's:  Recent Labs   Lab Test 06/25/24  0422 06/24/24  0552 06/22/24  1626   WBC 6.4 6.8 8.4   HGB 12.2 13.6 13.9   MCV 93 91 90    442 377     Most Recent 3 BMP's:  Recent Labs   Lab Test 06/25/24  1043 06/25/24  0422 06/24/24  0552 06/22/24  1631 06/22/24  1242   NA  --  138 137  --  135   POTASSIUM 3.7 3.3* 3.5   < > 3.2*  3.2*   CHLORIDE  --  100 99  --  97*   CO2  --  30* 28  --  27   BUN  --  8.6 8.8  --  7.0*   CR  --  0.49* 0.52  --  0.41*   ANIONGAP  --  8 10  --  11   NANI  --  8.3* 9.4  --  8.5*   GLC  --  104* 122*  --  122*    < > = values in this interval not displayed.     Most Recent 2 LFT's:  Recent Labs   Lab Test 06/25/24  0422 06/21/24  0418   AST 25 48*   ALT 31 52*   ALKPHOS 85 118   BILITOTAL 0.3 0.6   ,   Results for orders placed or  performed during the hospital encounter of 06/18/24   XR Chest Port 1 View    Narrative    PROCEDURE INFORMATION:   Exam: XR Chest   Exam date and time: 6/18/2024 1:40 AM   Age: 78 years old   Clinical indication: Cough and fever; Additional info: Cough fever     TECHNIQUE:   Imaging protocol: Radiologic exam of the chest.   Views: 1 view.     COMPARISON:   No relevant prior studies available.     FINDINGS:   Lungs: Unremarkable. No consolidation.   Pleural spaces: No evidence of pleural effusion. No pneumothorax.   Heart/Mediastinum: The cardiac silhouette is enlarged. Cardiac monitor leads   overlie the thorax.   Vasculature: There is atherosclerotic calcification of the aortic arch.   Bones/joints: There are degenerative changes of the spine.       Impression    IMPRESSION:   Cardiomegaly. Otherwise, no radiographic evidence seen for an acute   cardiopulmonary process.     THIS DOCUMENT HAS BEEN ELECTRONICALLY SIGNED BY TARA IZQUIERDO MD   XR Chest Port 1 View    Narrative    PROCEDURE INFORMATION:   Exam: XR Chest   Exam date and time: 6/19/2024 4:10 AM   Age: 78 years old   Clinical indication: Other: Resp distress; Additional info: Respiratory   distress     TECHNIQUE:   Imaging protocol: Radiologic exam of the chest.   Views: 1 view.     COMPARISON:   CR XR CHEST PORT 1 VIEW 6/18/2024 1:40 AM     FINDINGS:   Lungs: Diffuse interstitial thickening. Hazy ground-glass opacities bilaterally   with central predominance.   Pleural spaces:  Suspect small volume bilateral pleural effusions. No   pneumothorax.   Heart/Mediastinum: Cardiac enlargement.   Bones/joints: Unremarkable.       Impression    IMPRESSION:   Interval worsening of interstitial and alveolar pulmonary edema features.     THIS DOCUMENT HAS BEEN ELECTRONICALLY SIGNED BY AME SHERMAN MD   CT Chest (PE) Abdomen Pelvis w Contrast    Narrative    PROCEDURE:  CT CHEST PE ABDOMEN PELVIS W CONTRAST.    HISTORY:  Evaluate for pulmonary embolism.  fever of  unknown origin    TECHNIQUE:  Initial pre-contrast  and localizer images were  obtained.  Contrast enhanced helical CT pulmonary angiography was then  performed.  Delayed CT images of the abdomen and pelvis were obtained.  Routine transaxial and post-processed (multiplanar and/or MIP)  reformations were obtained. This CT exam was performed using one or  more the following dose reduction techniques: automated exposure  control, adjustment of the mA and/or kV according to patient size,  and/or iterative reconstruction technique.    COMPARISON:  6/19/24    MEDS/CONTRAST: Isovue 370, 80 mL    PULMONARY CTA FINDINGS:  This is a diagnostic quality helical CT  pulmonary angiogram.  There is no acute pulmonary embolism to the  first subsegmental pulmonary artery level.    OTHER FINDINGS:      The heart is enlarged. No pericardial effusion is seen. The main  pulmonary artery and aorta are normal in caliber. No gross thoracic  adenopathy is identified.    Small bilateral pleural effusions are present. There are dependent  predominant groundglass pulmonary opacities and diffuse intralobular  septal thickening.     The left kidney is abnormal in appearance, with heterogeneous  hypoenhancement throughout the posterior lower cortex measuring up to  3.2 cm. There is no hydronephrosis. No collecting system calculus is  seen. The bladder is mostly decompressed.    The liver, spleen and pancreas are unremarkable. A right adrenal  nodule is noted. The bowel is normal in caliber. The gallbladder is  contracted. Trace pelvic free fluid is seen.    No suspicious osseous lesion is identified.      Impression    IMPRESSION:    Abnormal appearance of the left kidney with a 3.2 cm area of  heterogeneous hypoenhancement suspicious for focal pyelonephritis  versus an early renal abscess.     No acute pulmonary emboli to the subsegmental level.    Cardiomegaly. Small pleural effusions. Interstitial and groundglass  pulmonary opacities  suggest interstitial and alveolar edema.  Superimposed infection is not excluded. Recommend follow-up.    JACOB COTTER MD         SYSTEM ID:  [id number]   CT Head w/o Contrast    Addendum: 6/22/2024    Addendum created by Sharon Coronado MD on 6/22/2024 1:05:11 PM CDT:  THIS REPORT CONTAINS FINDINGS THAT MAY BE CRITICAL TO PATIENT CARE. The   findings were verbally communicated via telephone conference at 1:05 PM CDT on   6/22/2024 with KIANNA THOMAS.   The findings were acknowledged and understood.          Narrative    Initial report created on 6/22/2024 12:52:29 PM CDT:    PROCEDURE INFORMATION:   Exam: CT Head Without Contrast   Exam date and time: 6/22/2024 12:26 PM   Age: 78 years old   Clinical indication: Stroke-like symptoms; Other: Code stroke to evaluate for   potential thrombolysis and thrombectomy. ; Additional info: Code stroke to   evaluate for potential thrombolysis and thrombectomy. Please read immediately.     TECHNIQUE:   Imaging protocol: Computed tomography of the head without contrast.   Radiation optimization: All CT scans at this facility use at least one of these   dose optimization techniques: automated exposure control; mA and/or kV   adjustment per patient size (includes targeted exams where dose is matched to   clinical indication); or iterative reconstruction.   Other technique: STROKE PROTOCOL was implemented.     COMPARISON:   CTA HEAD NECK W CONTRAST 6/22/2024 12:24 PM     FINDINGS:   Brain: There is a partially calcified right posterior fossa lesion measuring   2.2 x 1.9 cm. There is no evidence of acute intracranial hemorrhage,   extra-axial collection or locoregional mass effect. There are scattered   hypodensities in the periventricular and subcortical white matter. The   appearance is nonspecific, but most likely represents chronic small vessel   disease in a person of this age   Cerebral ventricles: The ventricles, sulci and cisterns are normal in size and    configuration for patient's age. No hydrocephalus or midline structure shift   Pituitary gland and sella: Sellar/parasellar structures, craniocervical   junction and orbits are unremarkable   Paranasal sinuses: Visualized sinuses are unremarkable. No fluid levels.   Mastoid air cells: Visualized mastoid air cells are well aerated.   Bones: No calvarial fracture   Soft tissues: Unremarkable.       Impression    IMPRESSION:   1.   No acute intracranial abnormality. If focal neurological symptoms persist   brain MRI can be obtained for better evaluation   2.   2.2 x 1.9 cm right posterior fossa calcified lesion.     ASSESSMENT:   ASPECTS (Alberta Stroke Program Early CT Score) is 10.     THIS DOCUMENT HAS BEEN ELECTRONICALLY SIGNED BY SHARON MOTTA MD   CTA Head Neck with Contrast    Addendum: 6/22/2024    Addendum created by Sharon Motta MD on 6/22/2024 1:04:57 PM CDT:  THIS REPORT CONTAINS FINDINGS THAT MAY BE CRITICAL TO PATIENT CARE. The   findings were verbally communicated via telephone conference at 1:04 PM CDT on   6/22/2024 with KIANNA THOMAS.   The findings were acknowledged and understood.          Narrative    Initial report created on 6/22/2024 1:03:10 PM CDT:    PROCEDURE INFORMATION:   Exam: CTA Head With Contrast, Arteriography   Exam date and time: 6/22/2024 12:29 PM   Age: 78 years old   Clinical indication: Stroke-like symptoms; Altered mental status/memory loss;   Additional info: Code stroke to evaluate for potential thrombolysis and   thrombectomy. Please read immediately.     TECHNIQUE:   Imaging protocol: Computed tomographic angiography of the head with contrast.   Exam focused on the arteries.   3D rendering (Not supervised by radiologist): MIP and/or 3D reconstructed   images were created by the technologist.   Radiation optimization: All CT scans at this facility use at least one of these   dose optimization techniques: automated exposure control; mA and/or kV    adjustment per patient size (includes targeted exams where dose is matched to   clinical indication); or iterative reconstruction.   Contrast material: ISOVUE 370; Contrast volume: 75 ml; Contrast route:   INTRAVENOUS (IV);      COMPARISON:   No relevant prior studies available.     FINDINGS:     ANTERIOR CIRCULATION:   Right internal carotid artery: Mild atherosclerotic changes in the right   cavernous carotid without flow-limiting stenosis.   Right middle cerebral artery: No occlusion or significant stenosis. No   aneurysm.    Right anterior cerebral artery: No occlusion or significant stenosis. No   aneurysm.      Left internal carotid artery: Intracranial segment is patent with no   significant stenosis. No aneurysm.   Left middle cerebral artery: No occlusion or significant stenosis. No aneurysm.      Left anterior cerebral artery: No occlusion or significant stenosis. No   aneurysm.      POSTERIOR CIRCULATION:   Right vertebral artery: No occlusion or significant stenosis. No aneurysm.    Left vertebral artery: No occlusion or significant stenosis. No aneurysm.    Basilar artery: No occlusion or significant stenosis. No aneurysm.   Right posterior cerebral artery: No occlusion or significant stenosis. No   aneurysm.    Left posterior cerebral artery: No occlusion or significant stenosis. No   aneurysm.      Veins: There is filling defect within the superior sagittal sinus and right   transverse sinus concerning for partial dural venous sinus thrombosis (series   6, image 177, 198).     Brain: There is redemonstration of enhancing right posterior fossa lesion,   likely extra-axial.   Cerebral ventricles: No ventriculomegaly.   Bones/joints: Unremarkable. No acute fracture.   Soft tissues: Unremarkable.       Impression    IMPRESSION:   1.   There is filling defect within the superior sagittal sinus and right   transverse sinus concerning for partial dural venous sinus thrombosis (series   6, image 177, 198).    2.   There is redemonstration of enhancing right posterior fossa lesion, likely   extra-axial.       =========================  PROCEDURE INFORMATION:   Exam: CTA Neck With Contrast   Exam date and time: 6/22/2024 12:29 PM   Age: 78 years old   Clinical indication: Stroke-like symptoms; Altered mental status/memory loss;   Additional info: Code stroke to evaluate for potential thrombolysis and   thrombectomy. Please read immediately.     TECHNIQUE:   Imaging protocol: Computed tomographic angiography of the neck with contrast.   Exam focused on the cervical segments of the vasculature.   3D rendering (Not supervised by radiologist): MIP and/or 3D reconstructed   images were created by the technologist.   Radiation optimization: All CT scans at this facility use at least one of these   dose optimization techniques: automated exposure control; mA and/or kV   adjustment per patient size (includes targeted exams where dose is matched to   clinical indication); or iterative reconstruction.   Contrast material: ISOVUE 370; Contrast volume: 75 ml; Contrast route:   INTRAVENOUS (IV);      COMPARISON:   CR CHEST PORT 1V 6/19/2024 4:10 AM     FINDINGS:   Right common carotid artery: No stenosis. No dissection or occlusion.   Right internal carotid artery: No stenosis of the extracranial segment. No   dissection or occlusion.   Right external carotid artery: No occlusion or stenosis of the origin.      Left common carotid artery: No stenosis. No dissection or occlusion.   Left internal carotid artery: No stenosis of the extracranial segment. No   dissection or occlusion.   Left external carotid artery: No occlusion or stenosis of the origin.      Right vertebral artery: No stenosis. No dissection or occlusion.   Left vertebral artery: No stenosis. No dissection or occlusion.     Pulmonary arteries: The main pulmonary trunk is prominent, which can be seen in   the context of pulmonary hypertension.     Soft tissues: Normal.  No significant soft tissue swelling.     Bones/joints: No acute fracture.     Lungs: Smooth interlobular septal thickening and ground-glass opacity   background.   Pleural spaces: Moderate bilateral pleural effusions     IMPRESSION:   1.   No occlusion or dissection along the major cervical arteries.   2.   Pulmonary interstitial edema.     REFERENCES:   NASCET CRITERIA. The degree of stenosis in the cervical segment of the internal   carotid artery is based on NASCET criteria. Normal is no stenosis. Mild is less   than 50% stenosis. Moderate is 50-69% stenosis. Severe is 70% to 99% stenosis.   Total occlusion is no detectable patent lumen.     THIS DOCUMENT HAS BEEN ELECTRONICALLY SIGNED BY ALONZO MOTTA MD   CTV Head Neck w Contrast    Narrative    PROCEDURE INFORMATION:   Exam: CTA Head With Contrast, Venography   Exam date and time: 6/22/2024 2:23 PM   Age: 78 years old   Clinical indication: Other: Prior scan; Additional info: Concern for cvst     TECHNIQUE:   Imaging protocol: Computed tomography angiography of the head with contrast.   Exam focused on the veins.   3D rendering (Not supervised by radiologist): MIP and/or 3D reconstructed   images were created by the technologist.   Radiation optimization: All CT scans at this facility use at least one of these   dose optimization techniques: automated exposure control; mA and/or kV   adjustment per patient size (includes targeted exams where dose is matched to   clinical indication); or iterative reconstruction.   Contrast material: ISOVUE 370; Contrast volume: 70 ml; Contrast route:   INTRAVENOUS (IV);      COMPARISON:   CTA HEAD NECK W CONTRAST 6/22/2024 12:24 PM     FINDINGS:   Superior sagittal sinus: There is a focal filling defect re-identified in the   superior sagittal sinus   Straight sinus: There is a focal filling defect in the right straight sinus.   Transverse sinuses: Patent.   Sigmoid sinuses: Patent.   Internal jugular veins: Limited  visualized internal jugular veins are patent.     Brain: There is redemonstration of partially calcified enhancing lesions in the   right posterior fossa.   Cerebral ventricles: No ventriculomegaly.      Soft tissues: Unremarkable.       Impression    IMPRESSION:   Focal filling defects in the superior sagittal sinus and right transverse sinus   suspicious for partial dural sinus thrombosis. Large arachnoid granulations   could demonstrate a similar appearance. Evaluation with brain MRI is   recommended.       =========================  PROCEDURE INFORMATION:   Exam: CTA Neck With Contrast   Exam date and time: 6/22/2024 2:23 PM   Age: 78 years old   Clinical indication: Other: Prior scan; Additional info: Concern for cvst     TECHNIQUE:   Imaging protocol: Computed tomographic angiography of the neck with contrast.   Exam focused on the cervical segments of the vasculature.   3D rendering (Not supervised by radiologist): MIP and/or 3D reconstructed   images were created by the technologist.   Radiation optimization: All CT scans at this facility use at least one of these   dose optimization techniques: automated exposure control; mA and/or kV   adjustment per patient size (includes targeted exams where dose is matched to   clinical indication); or iterative reconstruction.   Contrast material: ISOVUE 370; Contrast volume: 70 ml; Contrast route:   INTRAVENOUS (IV);      COMPARISON:   CTA HEAD NECK W CONTRAST 6/22/2024 12:24 PM     FINDINGS:   Right common carotid artery: No stenosis. No dissection or occlusion.   Right internal carotid artery: No stenosis of the extracranial segment. No   dissection or occlusion.   Right external carotid artery: No occlusion or stenosis of the origin.      Left common carotid artery: No stenosis. No dissection or occlusion.   Left internal carotid artery: No stenosis of the extracranial segment. No   dissection or occlusion.   Left external carotid artery: No occlusion or stenosis  of the origin.      Right vertebral artery: No stenosis. No dissection or occlusion.   Left vertebral artery: No stenosis. No dissection or occlusion.     Soft tissues: Normal. No significant soft tissue swelling.     Bones/joints: No acute fracture.     Lungs: Smooth interlobular septal thickening and ground-glass opacity   background.     Other findings: Moderate right effusion.     IMPRESSION:   1.   No stenosis or occlusion.   2.   Pulmonary interstitial edema.     REFERENCES:   NASCET CRITERIA. The degree of stenosis in the cervical segment of the internal   carotid artery is based on NASCET criteria. Normal is no stenosis. Mild is less   than 50% stenosis. Moderate is 50-69% stenosis. Severe is 70% to 99% stenosis.   Total occlusion is no detectable patent lumen.     THIS DOCUMENT HAS BEEN ELECTRONICALLY SIGNED BY ALONZO MOTTA MD   MR Brain w/o & w Contrast    Narrative    EXAM:  MR BRAIN W/O & W CONTRAST    HISTORY:  likely stroke.    TECHNIQUE:  Multiplanar, multisequence MR imaging of the head obtained  prior to, and after, intravenous contrast administration    MEDS/CONTRAST: 12 ML DOTAREM    COMPARISON:  CTV head, CT head, CTA head and neck 6/22/2024     FINDINGS:    Small areas of abnormal cortical and subcortical diffusion restriction  along the lateral left frontal lobe. These are associated with T2  hyperintense signal, no abnormal enhancement, and little to no  associated gyral swelling. Findings likely represent acute infarcts.     Abnormal area of diffusion restriction along the posterior left  occipital lobe with T2 hyperintense signal, faint enhancement, and  associated gyral swelling. Findings likely represent subacute infarct.    Dural based enhancing mass in the right posterior fossa measuring 2.3  x 2.0 x 2.2 cm. There is local mass effect on the right cerebellum. No  significant underlying parenchymal signal change or edema. No  significant mass effect on the fourth  ventricle.    There is no midline shift or evidence of acute intracranial  hemorrhage. No acute hydrocephalus. Scattered foci of T2 hyperintense  FLAIR signal in the periventricular and subcortical white matter,  which is nonspecific, likely related to chronic small vessel ischemic  disease given the patient's age. Normal parenchymal volume for age.  Normal major arterial intracranial vascular flow voids. Circumscribed  hypointense defects in the right transverse sinus and superior  sagittal sinus likely represent impressions from arachnoid  granulations.    No suspicious abnormality of the skull marrow signal. Mild paranasal  sinus mucosal thickening. Mastoid air cells are clear. Bilateral  pseudophakia.      Impression    IMPRESSION:  1. Acute lateral left frontal lobe cortical infarcts. Subacute left  occipital lobe infarct.  2. 2.3 cm right posterior fossa meningioma.  3. Filling defects in the right transverse sinus and superior sagittal  sinus are most likely benign incidental arachnoid granulation  impressions. No intracranial findings to suggest associated venous  infarctions.    KINGSTON CHAU MD         SYSTEM ID:  [id number]   Echocardiogram Complete     Value    LVEF  55-60%    Narrative    [id number]  [id number]  [id number]  [id number]^ANI^LUIS A^PIERCE     [facility]  [address] [place] [address]     Name: ARUN DA SILVA  MRN: [id number]  : 1945  Study Date: 2024 08:11 AM  Age: 78 yrs  Gender: Female  Patient Location: [facility]  Reason For Study: Atrial Fibrillation  Ordering Physician: LUIS A LAW  Performed By: ELPIDIO Guillermo, RDCS, RVT     BSA: 1.6 m2  Height: 63 in  Weight: 125 lb  HR: 104  BP: 137/85 mmHg  ______________________________________________________________________________  Procedure  Complete Portable Echo Adult.  ______________________________________________________________________________  Interpretation Summary  The patient is in atrial  fibrillation with RVR     Global and regional left ventricular function is normal with an EF of 55-60%.  Global right ventricular function is normal.  Severe left atrial enlargement is present.  Moderate mitral insufficiency is present.  IVC diameter and respiratory changes fall into an intermediate range  suggesting an RA pressure of 8 mmHg.  No pericardial effusion is present.  There is no prior study for direct comparison.  ______________________________________________________________________________  Left Ventricle  Left ventricular size is normal. Global and regional left ventricular function  is normal with an EF of 55-60%. Mild concentric wall thickening consistent  with left ventricular hypertrophy is present. Diastolic function not assessed  due to atrial fibrillation.     Right Ventricle  The right ventricle is normal size. Global right ventricular function is  normal.     Atria  Severe left atrial enlargement is present.     Mitral Valve  Moderate mitral insufficiency is present. Likely in the setting of severe  atrial enlargement.     Aortic Valve  Aortic valve sclerosis is present. Trace aortic insufficiency is present.     Tricuspid Valve  Mild tricuspid insufficiency is present. The right ventricular systolic  pressure is 27mmHg above the right atrial pressure. Pulmonary artery systolic  pressure is normal.     Pulmonic Valve  The valve leaflets are not well visualized. Trace pulmonic insufficiency is  present.     Vessels  The thoracic aorta is normal. The aorta root is normal. IVC diameter and  respiratory changes fall into an intermediate range suggesting an RA pressure  of 8 mmHg.     Pericardium  No pericardial effusion is present.     Compared to Previous Study  There is no prior study for direct comparison.     ______________________________________________________________________________  MMode/2D Measurements & Calculations  IVSd: 1.6 cm  LVIDd: 3.5 cm  LVIDs: 2.2 cm  LVPWd: 1.1 cm  FS: 36.4  %     LV mass(C)d: 163.8 grams  LV mass(C)dI: 103.4 grams/m2  Ao root diam: 3.3 cm  asc Aorta Diam: 3.3 cm  LVOT diam: 2.1 cm  LVOT area: 3.4 cm2  Ao root diam index Ht(cm/m): 2.1  Ao root diam index BSA (cm/m2): 2.1  Asc Ao diam index BSA (cm/m2): 2.1  Asc Ao diam index Ht(cm/m): 2.0  LA Volume (BP): 112.0 ml  LA Volume Index (BP): 70.9 ml/m2     RWT: 0.64  TAPSE: 1.8 cm     Doppler Measurements & Calculations  MV E max theodore: 127.5 cm/sec  MV dec time: 0.14 sec  Ao V2 max: 117.0 cm/sec  Ao max P.0 mmHg  Ao V2 mean: 83.3 cm/sec  Ao mean PG: 3.0 mmHg  Ao V2 VTI: 18.2 cm  BÁRBARA(I,D): 2.2 cm2  BÁRBARA(V,D): 2.6 cm2  LV V1 max PG: 3.4 mmHg  LV V1 max: 90.2 cm/sec  LV V1 VTI: 11.8 cm  MR PISA: 0.97 cm2  MR ERO: 0.05 cm2  MR volume: 7.6 ml  SV(LVOT): 40.0 ml  SI(LVOT): 25.3 ml/m2  PA acc time: 0.12 sec  TR max theodore: 240.8 cm/sec  TR max P.3 mmHg  AV Theodore Ratio (DI): 0.77  BÁRBARA Index (cm2/m2): 1.4  RV S Theodore: 16.4 cm/sec     ______________________________________________________________________________  Report approved by: Roberto Carlos Vasquez 2024 09:24 AM             Discharge Medications   Current Discharge Medication List        START taking these medications    Details   apixaban ANTICOAGULANT (ELIQUIS) 5 MG tablet Take 1 tablet (5 mg) by mouth 2 times daily  Qty: 60 tablet, Refills: 0    Associated Diagnoses: Atrial fibrillation with rapid ventricular response (H); Acute CVA (cerebrovascular accident) (H)      diltiazem ER COATED BEADS (CARDIZEM CD) 360 MG 24 hr capsule Take 1 capsule (360 mg) by mouth daily  Qty: 90 capsule, Refills: 0    Associated Diagnoses: Atrial fibrillation with rapid ventricular response (H)      metoprolol tartrate (LOPRESSOR) 50 MG tablet Take 1 tablet (50 mg) by mouth 2 times daily  Qty: 180 tablet, Refills: 0    Associated Diagnoses: Atrial fibrillation with rapid ventricular response (H)      potassium chloride maame ER (KLOR-CON M20) 20 MEQ CR tablet Take 1 tablet (20  "mEq) by mouth daily  Qty: 30 tablet, Refills: 0    Associated Diagnoses: Atrial fibrillation with rapid ventricular response (H)           CONTINUE these medications which have NOT CHANGED    Details   baclofen (LIORESAL) 10 MG tablet Take 10 mg by mouth every evening      fluticasone (FLONASE) 50 MCG/ACT nasal spray Spray 1 spray into both nostrils daily      montelukast (SINGULAIR) 10 MG tablet Take 10 mg by mouth at bedtime      pravastatin (PRAVACHOL) 20 MG tablet Take 20 mg by mouth daily      order for DME Neoprene glove for right hand for DJD and Raynaud's, custom.           STOP taking these medications       amLODIPine (NORVASC) 2.5 MG tablet Comments:   Reason for Stopping:         lisinopril (PRINIVIL/ZESTRIL) 40 MG tablet Comments:   Reason for Stopping:             Allergies   Allergies   Allergen Reactions    Amoxicillin-Pot Clavulanate Diarrhea and GI Disturbance    Aspirin      Other reaction(s): Bronchospasm  Tolerates 81 mg aspirin    Hydrochlorothiazide      \"dizzy\"    Milk (Cow) GI Disturbance     "

## 2024-06-26 ENCOUNTER — PATIENT OUTREACH (OUTPATIENT)
Dept: FAMILY MEDICINE | Facility: OTHER | Age: 79
End: 2024-06-26
Payer: COMMERCIAL

## 2024-06-26 NOTE — PROGRESS NOTES
Service Date: 2024    Ryley Nails MD  [address] [place] [address]     RE:  Sherri Bennett   MRN:  [id number]   :  1945       Dear Dr. Nails:    It was a pleasure participating in the care of your patient, Sherri Bennett .  As you know, she is a 78 year old year old person who I met over a virtual visit today for paroxysmal atrial fibrillation, hypertension, lipids.    Past medical history is significant for the followin.  Hypertension  2.  Acute left frontal lobe CVA  3.  Raynaud's  4.  Pyelonephritis with secondary sepsis  5.  Fibrocystic breast disease  6.  Back pain  7.  Arthritis of hand    Patient was recently hospitalized at [facility] on 2024 after experiencing 1 week of malaise and nausea.  She was admitted with acute pyelonephritis and sepsis and was also found to be in atrial fibrillation with rapid ventricular response.  Shortly after being admitted she also had a CVA diagnosed by head MRI of an acute nature in the lateral left frontal lobe.  She initially was not anticoagulated due to thrombocytopenia during the hospitalization but this normalized after antibiotic therapy was administered.    Her RRY8CG2-UXMt 2 score is 4 for hypertension vascular disease and age.  She was discharged on apixaban and diltiazem 360 mg a day and metoprolol tartrate 50 mg twice daily.    She presents today in follow-up after this hospitalization.    From a clinical standpoint, she has done well since coming home.  She is able to carry her laundry up and down the stairs 6 or 7 times a day without gross limitation.  She has tolerated her medicines well without gross bleeding, bloody noses, black or bloody stools or other side effects.  She cannot remember what it was like having atrial fibrillation as an inpatient and does not complain of any rapid palpitations currently.    Since her stroke she has had trouble with some word finding but is able to ambulate without  difficulty.    The patient otherwise denies gross chest pain, shortness of breath, PND, orthopnea, edema, palpitations, syncope or near syncope.    10 Point Review of Systems: Word finding difficulties post stroke, negative for snoring and or hypersomnolence    MEDICATIONS:    1.  Apixaban 5 mg twice daily  2.  Diltiazem extended release 360 mg a day  3.  Metoprolol titrate 50 mg twice daily  4.  Potassium 20 milliequivalents a day  5.  Pravastatin 20 mg a day    PHYSICAL EXAM:    Home blood pressures currently running 126/64 with a pulse of 60  General:  Patient appears comfortable, well groomed  Psych:  Patient is alert and oriented X 3  Eyes:  No gross erythema, exudate  Respiratory:  Patient is breathing comfortably without gross cough    The remainder of the comprehensive physical exam was deferred secondary to the COVID-19 pandemic and secondary to virtual visit restrictions.    LABS:    620 five 2024: LDL 60, potassium 3.7, GFR normal, hemoglobin normal    MRI brain acute left frontal lobe cortical infarct, with 2.3 cm posterior fossa meningioma    EKG 6/21/2024 reveals normal sinus rhythm, with low voltage across anterior precordium    CTA head neck 6/22/2024 reveals possible venous sinus thrombosis, mild atherosclerotic disease of the right carotid    Echocardiogram 6/18/2024 reveals an ejection fraction of 55 to 60%, moderate MR, mild LVH     IMPRESSION:    Sherri is a 78-year-old lady with past medical history significant for recent pyelonephritis with secondary sepsis, and recent CVA of the left frontal lobe who presents with several active issues:    1.  Paroxysmal atrial fibrillation (likely secondary to acute illness)    Patient presented to [facility] with A-fib and rapid rate likely secondary to the acute pyelonephritis and sepsis that she was experiencing at the time.  She had a secondary left frontal lobe CVA during the hospitalization likely from her atrial  "fibrillation.    OVA8CO5-XYUl 2 score is currently 6 for hypertension,.  She is currently anticoagulated with apixaban 5 mg twice daily and on rate prior stroke, vascular disease, age control with diltiazem extended release 360 mg a day along with metoprolol titrate 50 mg twice daily.    Her echocardiogram reveals normal LV systolic function with moderate MR, mild LVH, and significant left atrial enlargement.    From a clinical standpoint, since being discharged from the hospital, she has done well and has tolerated her diltiazem, metoprolol as well as apixaban without gross bleeding or other side effects.    She has denied new palpitations or episodes of A-fib from symptom standpoint.  Continued medical therapy and clinical monitoring would be indicated    2.  Hypertension    Blood pressures currently running 126/64 with a pulse of 60, continue to follow    3.  Lipids    Goal LDL less than 70, most recent LDL 6/25/2024 was 60 within goal range, continue to follow.      PLAN:    1.  Continue present medications at present doses    2.  Repeat echo and labs in 1 year with clinical follow-up at that time, earlier if needed.      Once again, it was a pleasure participating in the care of your patient, Sherri Bennett .  Please feel free to contact me at any time if there are any questions regarding her care in the future.      Sincerely,          Jose Guadalupe Calvin M.D.  Cardiovascular Division  [facility]    The patient has been notified of following:     \"This video visit will be conducted via a call between you and your physician/provider. We have found that certain health care needs can be provided without the need for an in-person physical exam.  This service lets us provide the care you need with a video conversation.  If a prescription is necessary we can send it directly to your pharmacy.  If lab work is needed we can place an order for that and you can then stop by our lab to have the test done at a " "later time.    Video visits are billed at different rates depending on your insurance coverage.  Please reach out to your insurance provider with any questions.    If during the course of the call the physician/provider feels a video visit is not appropriate, you will not be charged for this service.\"    Patient has given verbal consent for video visit? Yes    How would you like to obtain your AVS? Mail    Video-Visit Details    Type of service:  Video Visit    Video Start Time:1145am    Video End Time:1205pm    Total visit time including video visit, chart review, charting, coordination of care =56min    Originating Location (pt. Location):patient home      Distant Location (provider location):   Off site home office    Platform used for Video Visit: WildBlue    [facility]#: [id number]           "

## 2024-06-26 NOTE — TELEPHONE ENCOUNTER
Transitions of Care Outreach  Chief Complaint   Patient presents with    Hospital F/U       Most Recent Admission Date: 6/18/2024   Most Recent Admission Diagnosis: Acute pyelonephritis - N10  Atrial fibrillation with rapid ventricular response (H) - I48.91  Acute viral syndrome - B34.9  Sepsis without acute organ dysfunction, due to unspecified organism (H) - A41.9  Lab test negative for COVID-19 virus - Z20.822     Most Recent Discharge Date: 6/25/2024   Most Recent Discharge Diagnosis: Acute viral syndrome - B34.9  Atrial fibrillation with rapid ventricular response (H) - I48.91  Sepsis without acute organ dysfunction, due to unspecified organism (H) - A41.9  Lab test negative for COVID-19 virus - Z20.822  Acute pyelonephritis - N10  Acute CVA (cerebrovascular accident) (H) - I63.9     Transitions of Care Assessment    Discharge Assessment  How are you doing now that you are home?: For the most part, she feels better.  Didn't sleep last night.  How are your symptoms? (Red Flag symptoms escalate to triage hotline per guidelines): Unchanged (Main problem is she still has nausea.)  Do you know how to contact your clinic care team if you have future questions or changes to your health status? : No  Does the patient have their discharge instructions? : Yes  Does the patient have questions regarding their discharge instructions? : No  Were you started on any new medications or were there changes to any of your previous medications? : Yes  Does the patient have all of their medications?: Yes  Do you have questions regarding any of your medications? : No  Do you have all of your needed medical supplies or equipment (DME)?  (i.e. oxygen tank, CPAP, cane, etc.): Yes    Follow up Plan     Discharge Follow-Up  Discharge follow up appointment scheduled in alignment with recommended follow up timeframe or Transitions of Risk Category? (Low = within 30 days; Moderate= within 14 days; High= within 7 days): Yes  Discharge  Follow Up Appointment Date: 07/02/24  Discharge Follow Up Appointment Scheduled with?: Primary Care Provider    Future Appointments   Date Time Provider Department Center   7/2/2024 11:20 AM Greniger, Scarlett, APRN CNP [facility] [place]   7/9/2024 11:45 AM Sih, Jose Guadalupe Rolf, MD [facility] [place]       Outpatient Plan as outlined on AVS reviewed with patient.    For any urgent concerns, please contact our 24 hour nurse triage line: [phone])       Alla Farris RN

## 2024-06-27 LAB
ATRIAL RATE - MUSE: 63 BPM
DIASTOLIC BLOOD PRESSURE - MUSE: NORMAL MMHG
INTERPRETATION ECG - MUSE: NORMAL
P AXIS - MUSE: 21 DEGREES
PR INTERVAL - MUSE: 164 MS
QRS DURATION - MUSE: 90 MS
QT - MUSE: 462 MS
QTC - MUSE: 472 MS
R AXIS - MUSE: -5 DEGREES
SYSTOLIC BLOOD PRESSURE - MUSE: NORMAL MMHG
T AXIS - MUSE: 39 DEGREES
VENTRICULAR RATE- MUSE: 63 BPM

## 2024-07-01 ASSESSMENT — ASTHMA QUESTIONNAIRES
ACT_TOTALSCORE: 25
QUESTION_5 LAST FOUR WEEKS HOW WOULD YOU RATE YOUR ASTHMA CONTROL: COMPLETELY CONTROLLED
QUESTION_2 LAST FOUR WEEKS HOW OFTEN HAVE YOU HAD SHORTNESS OF BREATH: NOT AT ALL
QUESTION_1 LAST FOUR WEEKS HOW MUCH OF THE TIME DID YOUR ASTHMA KEEP YOU FROM GETTING AS MUCH DONE AT WORK, SCHOOL OR AT HOME: NONE OF THE TIME
QUESTION_4 LAST FOUR WEEKS HOW OFTEN HAVE YOU USED YOUR RESCUE INHALER OR NEBULIZER MEDICATION (SUCH AS ALBUTEROL): NOT AT ALL
QUESTION_3 LAST FOUR WEEKS HOW OFTEN DID YOUR ASTHMA SYMPTOMS (WHEEZING, COUGHING, SHORTNESS OF BREATH, CHEST TIGHTNESS OR PAIN) WAKE YOU UP AT NIGHT OR EARLIER THAN USUAL IN THE MORNING: NOT AT ALL
ACT_TOTALSCORE: 25

## 2024-07-01 ASSESSMENT — PATIENT HEALTH QUESTIONNAIRE - PHQ9
SUM OF ALL RESPONSES TO PHQ QUESTIONS 1-9: 10
10. IF YOU CHECKED OFF ANY PROBLEMS, HOW DIFFICULT HAVE THESE PROBLEMS MADE IT FOR YOU TO DO YOUR WORK, TAKE CARE OF THINGS AT HOME, OR GET ALONG WITH OTHER PEOPLE: NOT DIFFICULT AT ALL
SUM OF ALL RESPONSES TO PHQ QUESTIONS 1-9: 10

## 2024-07-02 ENCOUNTER — OFFICE VISIT (OUTPATIENT)
Dept: INTERNAL MEDICINE | Facility: OTHER | Age: 79
End: 2024-07-02
Payer: COMMERCIAL

## 2024-07-02 ENCOUNTER — TELEPHONE (OUTPATIENT)
Dept: FAMILY MEDICINE | Facility: OTHER | Age: 79
End: 2024-07-02

## 2024-07-02 VITALS
WEIGHT: 123 LBS | SYSTOLIC BLOOD PRESSURE: 138 MMHG | RESPIRATION RATE: 18 BRPM | TEMPERATURE: 97.6 F | DIASTOLIC BLOOD PRESSURE: 72 MMHG | OXYGEN SATURATION: 99 % | HEART RATE: 55 BPM | BODY MASS INDEX: 21.79 KG/M2

## 2024-07-02 DIAGNOSIS — I63.9 ACUTE CVA (CEREBROVASCULAR ACCIDENT) (H): ICD-10-CM

## 2024-07-02 DIAGNOSIS — J45.909 MILD REACTIVE AIRWAYS DISEASE, UNSPECIFIED WHETHER PERSISTENT: ICD-10-CM

## 2024-07-02 DIAGNOSIS — G89.29 CHRONIC NECK PAIN: ICD-10-CM

## 2024-07-02 DIAGNOSIS — Z09 HOSPITAL DISCHARGE FOLLOW-UP: Primary | ICD-10-CM

## 2024-07-02 DIAGNOSIS — G47.09 OTHER INSOMNIA: ICD-10-CM

## 2024-07-02 DIAGNOSIS — I63.9 ACUTE CVA (CEREBROVASCULAR ACCIDENT) (H): Primary | ICD-10-CM

## 2024-07-02 DIAGNOSIS — E78.5 HYPERLIPIDEMIA LDL GOAL <100: ICD-10-CM

## 2024-07-02 DIAGNOSIS — N10 ACUTE PYELONEPHRITIS: ICD-10-CM

## 2024-07-02 DIAGNOSIS — I10 PRIMARY HYPERTENSION: ICD-10-CM

## 2024-07-02 DIAGNOSIS — M54.2 CHRONIC NECK PAIN: ICD-10-CM

## 2024-07-02 DIAGNOSIS — I48.91 ATRIAL FIBRILLATION WITH RAPID VENTRICULAR RESPONSE (H): ICD-10-CM

## 2024-07-02 LAB
ANION GAP SERPL CALCULATED.3IONS-SCNC: 9 MMOL/L (ref 7–15)
BUN SERPL-MCNC: 14.4 MG/DL (ref 8–23)
CALCIUM SERPL-MCNC: 10 MG/DL (ref 8.8–10.2)
CHLORIDE SERPL-SCNC: 103 MMOL/L (ref 98–107)
CREAT SERPL-MCNC: 0.65 MG/DL (ref 0.51–0.95)
DEPRECATED HCO3 PLAS-SCNC: 27 MMOL/L (ref 22–29)
EGFRCR SERPLBLD CKD-EPI 2021: >90 ML/MIN/1.73M2
ERYTHROCYTE [DISTWIDTH] IN BLOOD BY AUTOMATED COUNT: 12.4 % (ref 10–15)
GLUCOSE SERPL-MCNC: 109 MG/DL (ref 70–99)
HCT VFR BLD AUTO: 38.8 % (ref 35–47)
HGB BLD-MCNC: 12.9 G/DL (ref 11.7–15.7)
MCH RBC QN AUTO: 32 PG (ref 26.5–33)
MCHC RBC AUTO-ENTMCNC: 33.2 G/DL (ref 31.5–36.5)
MCV RBC AUTO: 96 FL (ref 78–100)
PLATELET # BLD AUTO: 405 10E3/UL (ref 150–450)
POTASSIUM SERPL-SCNC: 4.4 MMOL/L (ref 3.4–5.3)
RBC # BLD AUTO: 4.03 10E6/UL (ref 3.8–5.2)
SODIUM SERPL-SCNC: 139 MMOL/L (ref 135–145)
TSH SERPL DL<=0.005 MIU/L-ACNC: 1.89 UIU/ML (ref 0.3–4.2)
WBC # BLD AUTO: 5.7 10E3/UL (ref 4–11)

## 2024-07-02 PROCEDURE — 85027 COMPLETE CBC AUTOMATED: CPT | Mod: ZL

## 2024-07-02 PROCEDURE — 82565 ASSAY OF CREATININE: CPT | Mod: ZL

## 2024-07-02 PROCEDURE — 84443 ASSAY THYROID STIM HORMONE: CPT | Mod: ZL

## 2024-07-02 PROCEDURE — 99496 TRANSJ CARE MGMT HIGH F2F 7D: CPT

## 2024-07-02 PROCEDURE — 82374 ASSAY BLOOD CARBON DIOXIDE: CPT | Mod: ZL

## 2024-07-02 PROCEDURE — 36415 COLL VENOUS BLD VENIPUNCTURE: CPT | Mod: ZL

## 2024-07-02 RX ORDER — TRAZODONE HYDROCHLORIDE 50 MG/1
50 TABLET, FILM COATED ORAL AT BEDTIME
Qty: 90 TABLET | Refills: 4 | Status: SHIPPED | OUTPATIENT
Start: 2024-07-02

## 2024-07-02 RX ORDER — LISINOPRIL 10 MG/1
10 TABLET ORAL DAILY
Qty: 90 TABLET | Refills: 4 | Status: SHIPPED | OUTPATIENT
Start: 2024-07-02 | End: 2024-08-29

## 2024-07-02 RX ORDER — METOPROLOL TARTRATE 50 MG
50 TABLET ORAL 2 TIMES DAILY
Qty: 180 TABLET | Refills: 0 | Status: SHIPPED | OUTPATIENT
Start: 2024-07-02 | End: 2024-07-09

## 2024-07-02 RX ORDER — DILTIAZEM HYDROCHLORIDE 360 MG/1
360 CAPSULE, EXTENDED RELEASE ORAL DAILY
Qty: 90 CAPSULE | Refills: 0 | Status: SHIPPED | OUTPATIENT
Start: 2024-07-02 | End: 2024-07-09

## 2024-07-02 RX ORDER — PRAVASTATIN SODIUM 20 MG
20 TABLET ORAL DAILY
Qty: 90 TABLET | Refills: 4 | Status: SHIPPED | OUTPATIENT
Start: 2024-07-02 | End: 2024-08-28

## 2024-07-02 RX ORDER — BACLOFEN 10 MG/1
10 TABLET ORAL EVERY EVENING
Qty: 90 TABLET | Refills: 4 | Status: SHIPPED | OUTPATIENT
Start: 2024-07-02

## 2024-07-02 RX ORDER — MONTELUKAST SODIUM 10 MG/1
10 TABLET ORAL AT BEDTIME
Qty: 90 TABLET | Refills: 4 | Status: SHIPPED | OUTPATIENT
Start: 2024-07-02

## 2024-07-02 ASSESSMENT — PAIN SCALES - GENERAL: PAINLEVEL: NO PAIN (0)

## 2024-07-02 NOTE — NURSING NOTE
"Chief Complaint   Patient presents with    [facility] F/U     [facility]  6-18-24  kidney infection       Medication reconciliation completed.    FOOD SECURITY SCREENING QUESTIONS:    The next two questions are to help us understand your food security.  If you are feeling you need any assistance in this area, we have resources available to support you today.    Hunger Vital Signs:  Within the past 12 months we worried whether our food would run out before we got money to buy more. Never  Within the past 12 months the food we bought just didn't last and we didn't have money to get more. Never    Initial /72 (BP Location: Right arm, Patient Position: Sitting, Cuff Size: Adult Regular)   Pulse 55   Temp 97.6  F (36.4  C) (Temporal)   Resp 18   Wt 55.8 kg (123 lb)   SpO2 99%   BMI 21.79 kg/m   Estimated body mass index is 21.79 kg/m  as calculated from the following:    Height as of 6/18/24: 1.6 m (5' 3\").    Weight as of this encounter: 55.8 kg (123 lb).       Ann Rojas LPN .......  7/2/2024  11:06 AM    "

## 2024-07-02 NOTE — PATIENT INSTRUCTIONS
Restart lisinopril one tab daily.    Monitor blood pressure at home and return to the clinic with any concerns. If blood pressure is consistently less than 110 on the top or greater than 140.    I sent a prescription for trazadone to help with sleep. You can try a half tab to start and see if that helps.    You will get the results from lab today through [facility].     I suggest you call and make an appointment to establish care with a primary care provider. I gave you a list of the ones I work with on the back of my card.     Follow up with neurology as discussed at your discharge apt.

## 2024-07-02 NOTE — TELEPHONE ENCOUNTER
Fax from [facility] regarding Stroke hospital follow up referral that was placed during her admission at [facility] on 6/24/24.  [facility] would like a Neurology referral placed also.  Will [facility] please place new referral.    Soni Vera on 7/2/2024 at 4:40 PM

## 2024-07-02 NOTE — PROGRESS NOTES
Subjective   Sherri is a 78 year old, presenting for the following health issues:  Hospital F/U ([facility]  6-18-24  kidney infection)      ICD-10-CM    1. Hospital discharge follow-up  Z09 CBC W PLT No Diff     Basic Metabolic Panel     CBC W PLT No Diff     Basic Metabolic Panel      2. Primary hypertension  I10 lisinopril (ZESTRIL) 10 MG tablet      3. Atrial fibrillation with rapid ventricular response (H)  I48.91 apixaban ANTICOAGULANT (ELIQUIS) 5 MG tablet     metoprolol tartrate (LOPRESSOR) 50 MG tablet     TSH Reflex GH      4. Acute CVA (cerebrovascular accident) (H)  I63.9 apixaban ANTICOAGULANT (ELIQUIS) 5 MG tablet     diltiazem ER (TIAZAC) 360 MG 24 hr ER beaded capsule     Lupus Anticoagulant Panel      5. Acute pyelonephritis  N10       6. Mild reactive airways disease, unspecified whether persistent  J45.909 montelukast (SINGULAIR) 10 MG tablet      7. Hyperlipidemia LDL goal <100  E78.5 pravastatin (PRAVACHOL) 20 MG tablet      8. Chronic neck pain  M54.2 baclofen (LIORESAL) 10 MG tablet    G89.29       9. Other insomnia  G47.09 traZODone (DESYREL) 50 MG tablet        Sherri presents to the clinic today with her  for hospital discharge follow-up.  She presented to the ER on 6/18 with viral type symptoms including a nonproductive cough, body aches, fatigue, nausea, anorexia, and bodyaches.  She was found to be in atrial fibrillation with RVR and no known past history.  Troponin level was elevated.  She was treated with diltiazem and metoprolol.  UA was questionable for UTI and was treated with antibiotics.  She required admission due to to sepsis.  CT scan showed left pyelonephritis.  She was treated with ceftriaxone.  On 6/22 she had word finding difficulties with a left-sided facial droop.  CTV showed a dural sinus thrombosis.  She was started on heparin.  MRI on 6/24 showed acute lateral left frontal lobe cortical infarcts and subacute left occipital lobe infarct.  Telestroke consultation was  completed in the hospital.  She was discharged on Eliquis 5 mg twice daily.  She had a referral for outpatient speech therapy due to word finding difficulties and Occupational Therapy due to handwriting deficits.  Today in the clinic she continues with some speech difficulties.  She states that as of yesterday her handwriting has just about returned to baseline.  Vital signs are stable.  Heart rate 55 and blood pressure 138/72.  I will restart her lisinopril today as this was discontinued at discharge due to low blood pressures.  Encouraged patient to monitor her blood pressure at home and report any concerns.  She is to continue to follow-up with neurology as scheduled.    She states she has been having difficulties with sleep seeing being discharged from the hospital.  I gave her a prescription of trazodone and recommended she take a half a tablet at bedtime to start.    CBC, TSH, and BMP today unremarkable.  Orders placed for lab visit for lupus anticoagulation in 12 weeks as this resulted as positive while in the hospital.  Recommendation was to follow-up testing.  Patient is to follow-up in clinic with any further concerns.      Via the Health Maintenance questionnaire, the patient has reported the following services have been completed DEXA: [facility] 2023-06-01, this information has been sent to the abstraction team.  [facility]        Hospital Follow-up Visit:    Hospital/Nursing Home/IP Rehab Facility: [facility]  Date of Admission: 6-18-24  Date of Discharge: 6-25-24  Reason(s) for Admission: kidney infection    Was the patient in the ICU or did the patient experience delirium during hospitalization?  Yes was in ICU   no delirium    Do you have any other stressors you would like to discuss with your provider? No    Problems taking medications regularly:  None  Medication changes since discharge: None  Problems adhering to non-medication therapy:  None    Summary of  hospitalization:  [facility] discharge summary reviewed  Diagnostic Tests/Treatments reviewed.  Follow up needed: none  Other Healthcare Providers Involved in Patient s Care:          speech therapy and OT  Update since discharge: improved.  Plan of care communicated with patient     Continues with difficulty with speech.  Worse as she gets tired during the day.   Hand writing has improved significantly.     Sleeping is difficult since discharge.       Review of Systems  CONSTITUTIONAL: NEGATIVE for fever, chills, change in weight  INTEGUMENTARY/SKIN: NEGATIVE for worrisome rashes, moles or lesions  EYES: NEGATIVE for vision changes or irritation  ENT/MOUTH: NEGATIVE for ear, mouth and throat problems  RESP: NEGATIVE for significant cough or SOB  CV: NEGATIVE for chest pain, palpitations or peripheral edema  GI: NEGATIVE for nausea, abdominal pain, heartburn, or change in bowel habits  : NEGATIVE for frequency, dysuria, or hematuria  MUSCULOSKELETAL: NEGATIVE for significant arthralgias or myalgia  NEURO: POSITIVE for aphasia  ENDOCRINE: NEGATIVE for temperature intolerance, skin/hair changes  HEME: NEGATIVE for bleeding problems  PSYCHIATRIC: NEGATIVE for changes in mood or affect      Objective    /72 (BP Location: Right arm, Patient Position: Sitting, Cuff Size: Adult Regular)   Pulse 55   Temp 97.6  F (36.4  C) (Temporal)   Resp 18   Wt 55.8 kg (123 lb)   SpO2 99%   BMI 21.79 kg/m    Body mass index is 21.79 kg/m .  Physical Exam  Constitutional:       General: She is not in acute distress.     Appearance: Normal appearance. She is normal weight. She is not ill-appearing.   HENT:      Head: Normocephalic.      Nose: Nose normal. No congestion.      Mouth/Throat:      Mouth: Mucous membranes are moist.      Pharynx: No oropharyngeal exudate.   Eyes:      Extraocular Movements: Extraocular movements intact.      Conjunctiva/sclera: Conjunctivae normal.      Pupils: Pupils are equal,  round, and reactive to light.   Cardiovascular:      Rate and Rhythm: Regular rhythm. Bradycardia present.      Pulses: Normal pulses.      Heart sounds: Normal heart sounds.   Pulmonary:      Effort: Pulmonary effort is normal.      Breath sounds: Wheezing present.   Abdominal:      General: Abdomen is flat. Bowel sounds are normal.   Musculoskeletal:         General: Normal range of motion.      Cervical back: Normal range of motion.      Right lower leg: No edema.      Left lower leg: No edema.   Skin:     General: Skin is warm and dry.      Comments: On visible skin   Neurological:      Mental Status: She is alert and oriented to person, place, and time.      Motor: No weakness.      Coordination: Coordination normal.      Gait: Gait normal.      Comments: Slight aphasia.     Psychiatric:         Behavior: Behavior normal.         Thought Content: Thought content normal.        Signed Electronically by: PANCHITO Ling CNP    Answers submitted by the patient for this visit:  Patient Health Questionnaire (Submitted on 7/1/2024)  If you checked off any problems, how difficult have these problems made it for you to do your work, take care of things at home, or get along with other people?: Not difficult at all  PHQ9 TOTAL SCORE: 10

## 2024-07-03 ENCOUNTER — MYC MEDICAL ADVICE (OUTPATIENT)
Dept: CARDIOLOGY | Facility: OTHER | Age: 79
End: 2024-07-03
Payer: COMMERCIAL

## 2024-07-09 ENCOUNTER — VIRTUAL VISIT (OUTPATIENT)
Dept: CARDIOLOGY | Facility: OTHER | Age: 79
End: 2024-07-09
Attending: INTERNAL MEDICINE
Payer: COMMERCIAL

## 2024-07-09 VITALS
BODY MASS INDEX: 22.15 KG/M2 | DIASTOLIC BLOOD PRESSURE: 64 MMHG | HEART RATE: 50 BPM | HEIGHT: 63 IN | WEIGHT: 125 LBS | SYSTOLIC BLOOD PRESSURE: 126 MMHG

## 2024-07-09 DIAGNOSIS — I34.0 MITRAL VALVE INSUFFICIENCY, UNSPECIFIED ETIOLOGY: Primary | ICD-10-CM

## 2024-07-09 DIAGNOSIS — I63.9 ACUTE CVA (CEREBROVASCULAR ACCIDENT) (H): ICD-10-CM

## 2024-07-09 DIAGNOSIS — I48.91 ATRIAL FIBRILLATION WITH RAPID VENTRICULAR RESPONSE (H): ICD-10-CM

## 2024-07-09 DIAGNOSIS — E78.5 DYSLIPIDEMIA: ICD-10-CM

## 2024-07-09 PROCEDURE — 99204 OFFICE O/P NEW MOD 45 MIN: CPT | Mod: 95 | Performed by: INTERNAL MEDICINE

## 2024-07-09 RX ORDER — DILTIAZEM HYDROCHLORIDE 360 MG/1
360 CAPSULE, EXTENDED RELEASE ORAL DAILY
Qty: 90 CAPSULE | Refills: 3 | Status: SHIPPED | OUTPATIENT
Start: 2024-07-09

## 2024-07-09 RX ORDER — METOPROLOL TARTRATE 50 MG
50 TABLET ORAL 2 TIMES DAILY
Qty: 180 TABLET | Refills: 3 | Status: SHIPPED | OUTPATIENT
Start: 2024-07-09

## 2024-07-09 ASSESSMENT — PAIN SCALES - GENERAL: PAINLEVEL: MILD PAIN (3)

## 2024-07-09 NOTE — NURSING NOTE
Current patient location: [address] [place] [address]   [facility] [id number]    Is the patient currently in the state of [place]? YES    Visit mode:VIDEO    If the visit is dropped, the patient can be reconnected by: VIDEO VISIT: Send to e-mail at: [email or web address]    Will anyone else be joining the visit? NO  (If patient encounters technical issues they should call [phone])    How would you like to obtain your AVS? MyChart    Are changes needed to the allergy or medication list? No    Patient denies any changes and states that all information remains accurate since last reviewed/verified.     Are refills needed on medications prescribed by this physician? NO    Reason for visit: Consult    Barber Whyte MA VVF

## 2024-07-10 ENCOUNTER — THERAPY VISIT (OUTPATIENT)
Dept: OCCUPATIONAL THERAPY | Facility: OTHER | Age: 79
End: 2024-07-10
Attending: FAMILY MEDICINE
Payer: MEDICARE

## 2024-07-10 ENCOUNTER — THERAPY VISIT (OUTPATIENT)
Dept: SPEECH THERAPY | Facility: OTHER | Age: 79
End: 2024-07-10
Attending: FAMILY MEDICINE
Payer: MEDICARE

## 2024-07-10 DIAGNOSIS — I63.9 ACUTE CVA (CEREBROVASCULAR ACCIDENT) (H): ICD-10-CM

## 2024-07-10 PROCEDURE — 92523 SPEECH SOUND LANG COMPREHEN: CPT | Mod: GN | Performed by: SPEECH-LANGUAGE PATHOLOGIST

## 2024-07-10 PROCEDURE — 97110 THERAPEUTIC EXERCISES: CPT | Mod: GO

## 2024-07-10 PROCEDURE — 97165 OT EVAL LOW COMPLEX 30 MIN: CPT | Mod: GO

## 2024-07-10 NOTE — PROGRESS NOTES
OCCUPATIONAL THERAPY EVALUATION  Type of Visit: Evaluation    Stroke was on June 23rd. She was hospitalized for 8 days.  Patient is right handed    Strength:   Right grasp= 21 pounds   Lateral pinch= 11 pounds   3 point pinch= 9 pounds     Left grasp= 20 pounds   Lateral pinch= 8 pounds   3 point pinch= 7 pounds     No vision changes, no deficits in ROM    Patient reports more of a difficult time with word finding and being able to correctly spell words.               Subjective      Presenting condition or subjective complaint: After stroke, have trouble spelling and writing (some texting and typing on PC hard.)  Date of onset: 06/23/24    Relevant medical history: Asthma; DVT (blood clot); Heart problems; High blood pressure; Neck injury; Pain at night or rest; Stroke   Dates & types of surgery: Quite a number but nothing that related to this issue.    Prior diagnostic imaging/testing results: MRI; CT scan     Prior therapy history for the same diagnosis, illness or injury: No      Prior Level of Function  Transfers: Independent  Ambulation: Independent  ADL: Independent  IADL: Driving, Finances, Housekeeping, Laundry, Meal preparation, Medication management, volunteer work     Living Environment  Social support: With a significant other or spouse   Type of home: House; Basement   Stairs to enter the home: No       Ramp: No   Stairs inside the home: Yes 15 Is there a railing: Yes     Help at home: Other  Equipment owned: Straight Cane     Employment: Not Applicable    Hobbies/Interests: Reading, volunteering    Patient goals for therapy: Writing more smoothly and writing the right word.    Pain assessment:  No reports of pain at eval      Objective     Cognitive Status Examination  Orientation: Oriented to person, place and time   Level of Consciousness: Alert  Follows Commands and Answers Questions: 100% of the time  Personal Safety and Judgement: Intact  Memory: Intact  Attention: No deficits  identified  Organization/Problem Solving: No deficits identified  Executive Function: No deficits identified    VISUAL SKILLS  Visual Acuity: No deficits identified  Visual Field: Appears normal  Visual Attention: Appears normal    SENSATION: UE Sensation WNL    POSTURE: WNL  RANGE OF MOTION: UE AROM WFL  STRENGTH:  See above. UE strength equal bilaterally but below average bilaterally. Patient reports it is about baseline for her. She has a history of arthritis and carpal tunnel.   MUSCLE TONE: WNL  COORDINATION: WFL  BALANCE: WFL    FUNCTIONAL MOBILITY  Assistive Device(s): None  Ambulation: Independent   Wheelchair: NA    BED MOBILITY: WNL    TRANSFERS: Independent    BATHING: Independent    UPPER BODY DRESSING: Independent    LOWER BODY DRESSING: Independent    TOILETING: Independent    GROOMING: Independent    EATING/SELF FEEDING: Independent     ACTIVITY TOLERANCE: Good     INSTRUMENTAL ACTIVITIES OF DAILY LIVING (IADL): Able to complete as needed. Has a supportive spouse who is able to assist with any IADLs.       Assessment & Plan   CLINICAL IMPRESSIONS  Medical Diagnosis: CVA    Treatment Diagnosis: Weakness    Impression/Assessment: Pt is a 78 year old female presenting to Occupational Therapy due to Fine motor deficits, hand writing difficulties, and strength deficitis  The following significant findings have been identified: Impaired strength and fine motor deficits .  These identified deficits interfere with their ability to perform recreational activities and community or volunteer activities as compared to previous level of function.     Clinical Decision Making (Complexity):  Assessment of Occupational Performance: 1-3 Performance Deficits  Occupational Performance Limitations: meal preparation and cleanup, volunteer activities, and leisure activities  Clinical Decision Making (Complexity): Low complexity    PLAN OF CARE  Treatment Interventions:  Modalities: E-stim, Hot Pack,  Paraffin  Interventions: Cognitive Skills, Self-Care/Home Management, Therapeutic Activity, Therapeutic Exercise, Manual Therapy, Neuromuscular Re-education, Orthotic Fitting/Training    Long Term Goals   OT Goal 1  Goal Identifier: Strength  Goal Description: Patient will increase right  strength by 3 pounds (21 pounds at eval)  Rationale: In order to maximize safety and independence with performance of self-care activities  Target Date: 10/02/24  OT Goal 2  Goal Identifier: HEP  Goal Description: Patient will complete HEP as instructed and be able to demonstrate correct techniques.  Rationale: In order to maximize safety and independence with performance of self-care activities  Target Date: 10/02/24  OT Goal 3  Goal Identifier: Hand writing  Goal Description: Patient will be able to hand write a letter for 5+ minutes without fatiguing. .  Rationale: In order to maximize safety and independence with performance of self-care activities  Target Date: 10/02/24      Frequency of Treatment: 0-2x/week  Duration of Treatment: 12 weeks     Recommended Referrals to Other Professionals:  NA  Education Assessment: Learner/Method: Patient     Risks and benefits of evaluation/treatment have been explained.   Patient/Family/caregiver agrees with Plan of Care.     Evaluation Time:    OT Eval, Low Complexity Minutes (42885): 15      Signing Clinician: KRUPA Cassidy        [facility]                                                                                   OUTPATIENT OCCUPATIONAL THERAPY      PLAN OF TREATMENT FOR OUTPATIENT REHABILITATION   Patient's Last Name, First Name, Sherri Haynes YOB: 1945   Provider's Name   [facility]   Medical Record No.  [id number]     Onset Date: 06/23/24 Start of Care Date: 07/10/24     Medical Diagnosis:  CVA      OT Treatment Diagnosis:  Weakness Plan of  Treatment  Frequency/Duration:0-2x/week/12 weeks    Certification date from 07/10/24   To 10/02/24        See note for plan of treatment details and functional goals     KRUPA Cassidy                         I CERTIFY THE NEED FOR THESE SERVICES FURNISHED UNDER        THIS PLAN OF TREATMENT AND WHILE UNDER MY CARE .             Physician Signature               Date    X_____________________________________________________                  Referring Provider:  Kiko Cain    Initial Assessment  See Epic Evaluation- 07/10/24

## 2024-07-11 NOTE — PROGRESS NOTES
SPEECH LANGUAGE PATHOLOGY EVALUATION    Subjective      Presenting condition or subjective complaint: Patient described minimal word finding difficulties following stroke, however she stated that the word finding has since resolved within the past week.  Date of onset: 06/18/24    Relevant medical history: Patient diagnosed and admitted to hospital with acute CVA diagnosis on 6/18/2024, symptoms considered resolved 7/10/2024.        Prior therapy history for the same diagnosis, illness or injury: No     Living Environment  Social support: With a significant other or spouse   Help at home: Other  Equipment owned: Straight Cane     Employment: Not Applicable   Hobbies/Interests: Reading, volunteering      Pain assessment: Pain denied     Objective     AUDITORY COMPREHENSION (understanding of spoken language)  Multi-step commands: intact  Biographical/personal yes/no questions: intact  Simple/factual yes/no questions: intact  Complex yes/no questions: intact  Object identification - multiple objects: intact  Picture identification - field of 4: intact  Comprehension of sentences: intact  Comprehension of paragraph: intact  Auditory comprehension level of impairment: no impairment     VERBAL EXPRESSION (use of spoken language to express information)  Automatic Speech Tasks: alphabet: intact  counting: intact   Word Finding Skills: generative naming: intact   Repetition Skills: words: intact   phrases: intact  sentences: intact   Conversational Speech: connected speech: intact    Verbal expression level of impairment: no impairment    READING COMPREHENSION (understanding written language)  Oral Reading ability: letter/symbol level: intact  word level: intact  sentence level: intact  paragraph level: intact   Reading comprehension level of impairment: no impairment    WRITTEN EXPRESSION (use of writing skills to express information)  Written Expression: letters/symbols intact  word level intact   Written expression level  of impairment: no impairment    PRAGMATICS (the social or functional use of language)  Nonverbal skills: WNL   Verbal Skills: WNL   Pragmatics level of impairment: no impairment    COGNITIVE STATUS  Affect/mental status: WFL   Attention: intact   Cognition level of impairment: no impairment    Assessment & Plan   CLINICAL IMPRESSIONS   Medical Diagnosis: CVA    Treatment Diagnosis: Acute CVA   Impression/Assessment:   Patient is a 78-year-old female assessed using the Western Aphasia Battery Revised for language. Patient initially commenting on drastic improvements with speech, word-finding, and repetition abilities over the course of the past week. During initial interview, patient's speech appeared incredibly fluent and cohesive, and no word-finding difficulties were observed. During assessment with the WAB, patient able to answer all spontaneous speech questions with no difficulty, receiving a score of 10/10 in conversational questions and picture description. Patient able to comprehend auditory/verbal questions correctly in all given yes/no question opportunities. Patient able to name objects from field of 6 with 100% accuracy. Patient demonstrating ability to follow multi-step directions in all opportunities with 100% accuracy. Patient scored 100/100 in repetition tasks, with no deficits observed. Patient correctly completing 5/5 provided sentences with 100% accuracy. Given assessment results, patient demonstrating complete language recovery following stroke, and no deficits were observed during testing. Patient advised to monitor language skills and contact if language ability declines. No further speech therapy warranted at this time.    Education Assessment:   Learner/Method: Patient  Education Comments: Educated on brain anatomy and physiology, in additon to recommendations to monitor word finding and repetition for any signs of decline.    Risks and benefits of evaluation/treatment have been explained.    Patient/Family/caregiver agrees with Plan of Care.     Evaluation Time:    Sound production with lang comprehension and expression minutes ([id number]): 30      Signing Clinician: IMELDA To

## 2024-07-23 ENCOUNTER — MYC MEDICAL ADVICE (OUTPATIENT)
Dept: INTERNAL MEDICINE | Facility: OTHER | Age: 79
End: 2024-07-23
Payer: COMMERCIAL

## 2024-07-23 DIAGNOSIS — I63.9 ACUTE CVA (CEREBROVASCULAR ACCIDENT) (H): Primary | ICD-10-CM

## 2024-08-05 ENCOUNTER — MYC MEDICAL ADVICE (OUTPATIENT)
Dept: INTERNAL MEDICINE | Facility: OTHER | Age: 79
End: 2024-08-05
Payer: COMMERCIAL

## 2024-08-05 DIAGNOSIS — E87.6 HYPOKALEMIA: Primary | ICD-10-CM

## 2024-08-05 DIAGNOSIS — I48.91 ATRIAL FIBRILLATION WITH RAPID VENTRICULAR RESPONSE (H): ICD-10-CM

## 2024-08-06 RX ORDER — POTASSIUM CHLORIDE 1500 MG/1
20 TABLET, EXTENDED RELEASE ORAL DAILY
Qty: 30 TABLET | Refills: 0 | Status: CANCELLED | OUTPATIENT
Start: 2024-08-06

## 2024-08-06 NOTE — TELEPHONE ENCOUNTER
I'd like her to have labs done to check the potassium level. I will place an order and she can come for a lab only appointment. If needed, I will prescribe more potassium at that time.

## 2024-08-08 ENCOUNTER — LAB (OUTPATIENT)
Dept: LAB | Facility: OTHER | Age: 79
End: 2024-08-08
Payer: MEDICARE

## 2024-08-08 DIAGNOSIS — E87.6 HYPOKALEMIA: ICD-10-CM

## 2024-08-08 LAB
ALBUMIN SERPL BCG-MCNC: 4.3 G/DL (ref 3.5–5.2)
ALP SERPL-CCNC: 84 U/L (ref 40–150)
ALT SERPL W P-5'-P-CCNC: 28 U/L (ref 0–50)
ANION GAP SERPL CALCULATED.3IONS-SCNC: 10 MMOL/L (ref 7–15)
AST SERPL W P-5'-P-CCNC: 30 U/L (ref 0–45)
BILIRUB SERPL-MCNC: 0.5 MG/DL
BUN SERPL-MCNC: 11.1 MG/DL (ref 8–23)
CALCIUM SERPL-MCNC: 9.7 MG/DL (ref 8.8–10.4)
CHLORIDE SERPL-SCNC: 99 MMOL/L (ref 98–107)
CREAT SERPL-MCNC: 0.67 MG/DL (ref 0.51–0.95)
EGFRCR SERPLBLD CKD-EPI 2021: 90 ML/MIN/1.73M2
GLUCOSE SERPL-MCNC: 210 MG/DL (ref 70–99)
HCO3 SERPL-SCNC: 28 MMOL/L (ref 22–29)
POTASSIUM SERPL-SCNC: 3.7 MMOL/L (ref 3.4–5.3)
PROT SERPL-MCNC: 7.5 G/DL (ref 6.4–8.3)
SODIUM SERPL-SCNC: 137 MMOL/L (ref 135–145)

## 2024-08-08 PROCEDURE — 36415 COLL VENOUS BLD VENIPUNCTURE: CPT | Mod: ZL

## 2024-08-08 PROCEDURE — 84155 ASSAY OF PROTEIN SERUM: CPT | Mod: ZL

## 2024-08-09 RX ORDER — POTASSIUM CHLORIDE 1500 MG/1
20 TABLET, EXTENDED RELEASE ORAL DAILY
Qty: 30 TABLET | Refills: 0 | Status: SHIPPED | OUTPATIENT
Start: 2024-08-09 | End: 2024-08-29

## 2024-08-09 NOTE — TELEPHONE ENCOUNTER
Potasium refilled. She can continue to take the half dose. Her potasium level is in the lower end of normal range.

## 2024-08-28 PROBLEM — Z79.01 CHRONIC ANTICOAGULATION: Status: ACTIVE | Noted: 2024-08-28

## 2024-08-28 PROBLEM — E78.2 MIXED HYPERLIPIDEMIA: Status: ACTIVE | Noted: 2024-08-28

## 2024-08-28 PROBLEM — I69.898 HISTORY OF LATERALIZING MOTOR DEFICIT FOLLOWING CEREBROVASCULAR ACCIDENT (CVA): Status: ACTIVE | Noted: 2024-06-24

## 2024-08-29 ENCOUNTER — OFFICE VISIT (OUTPATIENT)
Dept: INTERNAL MEDICINE | Facility: OTHER | Age: 79
End: 2024-08-29
Attending: INTERNAL MEDICINE
Payer: COMMERCIAL

## 2024-08-29 VITALS
WEIGHT: 121.8 LBS | BODY MASS INDEX: 21.58 KG/M2 | TEMPERATURE: 96.9 F | HEART RATE: 53 BPM | OXYGEN SATURATION: 98 % | SYSTOLIC BLOOD PRESSURE: 128 MMHG | RESPIRATION RATE: 16 BRPM | DIASTOLIC BLOOD PRESSURE: 68 MMHG

## 2024-08-29 DIAGNOSIS — Z71.85 VACCINE COUNSELING: ICD-10-CM

## 2024-08-29 DIAGNOSIS — I10 BENIGN ESSENTIAL HYPERTENSION: ICD-10-CM

## 2024-08-29 DIAGNOSIS — E87.6 HYPOKALEMIA: ICD-10-CM

## 2024-08-29 DIAGNOSIS — I69.322 DYSARTHRIA AS LATE EFFECT OF CEREBROVASCULAR ACCIDENT (CVA): ICD-10-CM

## 2024-08-29 DIAGNOSIS — E78.2 MIXED HYPERLIPIDEMIA: ICD-10-CM

## 2024-08-29 DIAGNOSIS — M18.11 PRIMARY OSTEOARTHRITIS OF FIRST CARPOMETACARPAL JOINT OF RIGHT HAND: ICD-10-CM

## 2024-08-29 DIAGNOSIS — D22.9 MELANOCYTIC NEVUS OF SKIN: ICD-10-CM

## 2024-08-29 DIAGNOSIS — I69.898 HISTORY OF LATERALIZING MOTOR DEFICIT FOLLOWING CEREBROVASCULAR ACCIDENT (CVA): ICD-10-CM

## 2024-08-29 DIAGNOSIS — I48.0 HYPERCOAGULABLE STATE DUE TO PAROXYSMAL ATRIAL FIBRILLATION (H): Primary | ICD-10-CM

## 2024-08-29 DIAGNOSIS — D68.69 HYPERCOAGULABLE STATE DUE TO PAROXYSMAL ATRIAL FIBRILLATION (H): Primary | ICD-10-CM

## 2024-08-29 DIAGNOSIS — Z79.01 CHRONIC ANTICOAGULATION: ICD-10-CM

## 2024-08-29 PROBLEM — J96.01 SEPSIS WITH ACUTE HYPOXIC RESPIRATORY FAILURE WITHOUT SEPTIC SHOCK (H): Status: RESOLVED | Noted: 2024-06-18 | Resolved: 2024-08-29

## 2024-08-29 PROBLEM — A41.9 SEPSIS WITH ACUTE HYPOXIC RESPIRATORY FAILURE WITHOUT SEPTIC SHOCK (H): Status: RESOLVED | Noted: 2024-06-18 | Resolved: 2024-08-29

## 2024-08-29 PROBLEM — R65.20 SEPSIS WITH ACUTE HYPOXIC RESPIRATORY FAILURE WITHOUT SEPTIC SHOCK (H): Status: RESOLVED | Noted: 2024-06-18 | Resolved: 2024-08-29

## 2024-08-29 PROCEDURE — G2211 COMPLEX E/M VISIT ADD ON: HCPCS | Performed by: INTERNAL MEDICINE

## 2024-08-29 PROCEDURE — 99214 OFFICE O/P EST MOD 30 MIN: CPT | Performed by: INTERNAL MEDICINE

## 2024-08-29 PROCEDURE — G0463 HOSPITAL OUTPT CLINIC VISIT: HCPCS

## 2024-08-29 RX ORDER — LISINOPRIL 10 MG/1
10 TABLET ORAL DAILY
Qty: 90 TABLET | Refills: 4 | Status: SHIPPED | OUTPATIENT
Start: 2024-08-29

## 2024-08-29 RX ORDER — POTASSIUM CHLORIDE 750 MG/1
10 TABLET, EXTENDED RELEASE ORAL DAILY
Qty: 90 TABLET | Refills: 4 | Status: SHIPPED | OUTPATIENT
Start: 2024-08-29

## 2024-08-29 RX ORDER — PRAVASTATIN SODIUM 20 MG
20 TABLET ORAL DAILY
Qty: 90 TABLET | Refills: 4 | Status: SHIPPED | OUTPATIENT
Start: 2024-08-29

## 2024-08-29 ASSESSMENT — ENCOUNTER SYMPTOMS
ABDOMINAL PAIN: 0
DYSURIA: 0
LIGHT-HEADEDNESS: 0
PALPITATIONS: 1
CONFUSION: 0
HEMATURIA: 0
MYALGIAS: 0
AGITATION: 0
WHEEZING: 0
DIARRHEA: 0
VOMITING: 0
SHORTNESS OF BREATH: 0
WOUND: 0
COUGH: 0
NAUSEA: 0
CHILLS: 0
ARTHRALGIAS: 0
DIZZINESS: 0
BRUISES/BLEEDS EASILY: 1
FEVER: 0

## 2024-08-29 ASSESSMENT — ANXIETY QUESTIONNAIRES
4. TROUBLE RELAXING: NOT AT ALL
GAD7 TOTAL SCORE: 3
7. FEELING AFRAID AS IF SOMETHING AWFUL MIGHT HAPPEN: NOT AT ALL
5. BEING SO RESTLESS THAT IT IS HARD TO SIT STILL: NOT AT ALL
6. BECOMING EASILY ANNOYED OR IRRITABLE: NOT AT ALL
IF YOU CHECKED OFF ANY PROBLEMS ON THIS QUESTIONNAIRE, HOW DIFFICULT HAVE THESE PROBLEMS MADE IT FOR YOU TO DO YOUR WORK, TAKE CARE OF THINGS AT HOME, OR GET ALONG WITH OTHER PEOPLE: SOMEWHAT DIFFICULT
3. WORRYING TOO MUCH ABOUT DIFFERENT THINGS: SEVERAL DAYS
8. IF YOU CHECKED OFF ANY PROBLEMS, HOW DIFFICULT HAVE THESE MADE IT FOR YOU TO DO YOUR WORK, TAKE CARE OF THINGS AT HOME, OR GET ALONG WITH OTHER PEOPLE?: SOMEWHAT DIFFICULT
7. FEELING AFRAID AS IF SOMETHING AWFUL MIGHT HAPPEN: NOT AT ALL
GAD7 TOTAL SCORE: 3
2. NOT BEING ABLE TO STOP OR CONTROL WORRYING: SEVERAL DAYS
GAD7 TOTAL SCORE: 3
1. FEELING NERVOUS, ANXIOUS, OR ON EDGE: SEVERAL DAYS

## 2024-08-29 ASSESSMENT — PATIENT HEALTH QUESTIONNAIRE - PHQ9
10. IF YOU CHECKED OFF ANY PROBLEMS, HOW DIFFICULT HAVE THESE PROBLEMS MADE IT FOR YOU TO DO YOUR WORK, TAKE CARE OF THINGS AT HOME, OR GET ALONG WITH OTHER PEOPLE: NOT DIFFICULT AT ALL
SUM OF ALL RESPONSES TO PHQ QUESTIONS 1-9: 4
SUM OF ALL RESPONSES TO PHQ QUESTIONS 1-9: 4

## 2024-08-29 ASSESSMENT — PAIN SCALES - GENERAL: PAINLEVEL: NO PAIN (0)

## 2024-08-29 NOTE — NURSING NOTE
"Chief Complaint   Patient presents with    Establish Care    RECHECK     Afib    Patient presents to the clinic today for establish Care and a follow up for A fib    Initial There were no vitals taken for this visit. Estimated body mass index is 22.14 kg/m  as calculated from the following:    Height as of 7/9/24: 1.6 m (5' 3\").    Weight as of 7/9/24: 56.7 kg (125 lb).  Meds Reconciled: complete      Mayda Rockwell LPN,RADHA on 8/29/2024 at 2:16 PM  Ext. [phone]        Mayda Rockwell LPN  "

## 2024-08-29 NOTE — PATIENT INSTRUCTIONS
Blood pressure is well controlled.     Medications refilled.       Immunization History   Administered Date(s) Administered    COVID-19 12+ (2023-24) (MODERNA) 09/18/2023    COVID-19 Bivalent 12+ (Pfizer) 09/12/2022    COVID-19 Monovalent 18+ (Moderna) 03/01/2021, 03/29/2021, 11/26/2021, 04/04/2022    Influenza (High Dose) 3 valent vaccine 10/08/2013, 09/24/2014, 10/05/2015, 11/10/2016, 10/07/2018, 10/15/2019    Influenza (IIV3) PF 09/17/2009, 09/19/2011, 08/31/2012    Influenza Vaccine 65+ (Fluzone HD) 10/13/2020, 09/24/2021, 09/12/2022, 10/02/2023    Influenza Vaccine >6 months,quad, PF 09/11/2017, 09/12/2022    Influenza, seasonal, injectable, PF 09/14/2010    Pneumo Conj 13-V (2010&after) 12/01/2016    Pneumococcal 23 valent 02/14/2008, 12/19/2012    RSV Vaccine (Abrysvo) 11/14/2023    TDAP (Adacel,Boostrix) 01/21/2013, 02/01/2023    Zoster recombinant adjuvanted (SHINGRIX) 02/01/2023, 04/12/2023        Consider:  -- Annual Flu / Influenza vaccination - Every Fall (Starting about October 1st)    Consider:  -- COVID-19 Vaccination shot -- TWICE Yearly (Spring and Fall)      -- Pneumonia / Pneumococcal PCV vaccines are done / completed.       Return in approximately 6 months, or sooner as needed for follow-up with Dr. Scott.  - Annual Follow-up / Physical - Medicare Annual Wellness Visit     Clinic : [phone]  Appointment line: [phone]

## 2024-08-29 NOTE — PROGRESS NOTES
Assessment & Plan     ICD-10-CM    1. Hypercoagulable state due to paroxysmal atrial fibrillation (H)  D68.69 apixaban ANTICOAGULANT (ELIQUIS) 5 MG tablet    I48.0       2. 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 Benign essential hypertension  I10 lisinopril (ZESTRIL) 10 MG tablet      5. Mixed hyperlipidemia  E78.2 pravastatin (PRAVACHOL) 20 MG tablet      6. Chronic anticoagulation - Eliquis  Z79.01 apixaban ANTICOAGULANT (ELIQUIS) 5 MG tablet      7. Primary osteoarthritis of first carpometacarpal joint of right hand  M18.11       8. Hypokalemia  E87.6 potassium chloride maame ER (KLOR-CON M10) 10 MEQ CR tablet      9. Melanocytic nevus of skin  D22.9          Patient presents for follow-up multiple issues.    History of stroke.  Continues with pravastatin.  Continues on Eliquis.  Seems to be tolerating well.  Does bruise easily.  Needs refills.  Still has issues with some dysarthria especially when she is tired.  Otherwise seem to be doing well.  No changes for now.    Hand arthritis, primarily in the basilar thumb joint of the right hand.  Consider warm paraffin wax dips, massage therapy.    Low potassium.  Currently taking 10 mEq/day.  Large tablets.  Does have some difficulty swallowing them.  Has 20 mg tablets that she cuts in half.  Smaller pill sent to pharmacy, 10 mEq daily.    Skin lesions.  Has a melanocytic nevus.  Recommend surgical excision.  Appointment requested for excision.    HYPERTENSION - Ongoing. Blood pressure is currently well controlled.  Medication side effects: None. Denies syncope or presyncope.  Continue current medications.   Medication list reviewed/updated. Refills completed as needed.      MIXED HYPERLIPIDEMIA.  Ongoing. LDL is  at goal: Yes. Triglycerides are at goal: Yes.    Medication side effects reported: None.   Continue current medications for now. Medication list reviewed/updated. Refills completed as needed.  Recent Labs   Lab Test 06/25/24  0422 06/24/24  0552   CHOL 126  --    HDL 43*  --    LDL 60 63   TRIG 113  --         Atrial Fibrillation - Type: Paroxysmal Atrial Fibrillation, chronic, ongoing.  + Hypercoagulable state due to atrial fibrillation.  Continues with apixaban (ELIQUIS) for oral anticoagulation.  Heart rates are controlled with metoprolol and diltiazem.  Tolerating well.  Denies excessive bleeding issues.  Easy bruising. Medication list reviewed/updated. Refills completed as needed.      Vaccine counseling completed.  Encourage routine / annual vaccinations.    The longitudinal plan of care for the diagnosis(es)/condition(s) as documented were addressed during this visit. Due to the added complexity in care, I will continue to support Sherri in the subsequent management and with ongoing continuity of care.                   Return in about 6 months (around 3/4/2025) for Annual Medicare Wellness Visit.      Leonides Scott MD  [facility]    Review of Systems   Constitutional:  Negative for chills and fever.   HENT:  Negative for congestion and hearing loss.    Eyes:  Negative for visual disturbance.   Respiratory:  Negative for cough, shortness of breath and wheezing.    Cardiovascular:  Positive for palpitations. Negative for chest pain.   Gastrointestinal:  Negative for abdominal pain, diarrhea, nausea and vomiting.   Endocrine: Negative for cold intolerance and heat intolerance.   Genitourinary:  Negative for dysuria and hematuria.   Musculoskeletal:  Negative for arthralgias and myalgias.   Skin:  Negative for rash and wound.   Allergic/Immunologic: Negative for immunocompromised state.   Neurological:  Negative for dizziness and light-headedness.        Minimal dysarthria when tired -  following stroke   Hematological:  Bruises/bleeds easily.   Psychiatric/Behavioral:  Negative for agitation and confusion.        Sarah Polanco is a 78 year old, presenting for the following health issues:  Establish Care and RECHECK (Afib )        8/29/2024     2:15 PM   Additional Questions   Roomed by Mayda GARCIA     History of Present Illness       CKD: She is not using over the counter pain medicine.     Reason for visit:  To establish new primary care physician.  To follow up on Strokes.    She eats 4 or more servings of fruits and vegetables daily.She consumes 0 sweetened beverage(s) daily.She exercises with enough effort to increase her heart rate 10 to 19 minutes per day.  She exercises with enough effort to increase her heart rate 3 or less days per week.   She is taking medications regularly.                     Objective    /68   Pulse 53   Temp 96.9  F (36.1  C) (Temporal)   Resp 16   Wt 55.2 kg (121 lb 12.8 oz)   SpO2 98%   BMI 21.58 kg/m    Body mass index is 21.58 kg/m .  Physical Exam  Constitutional:       General: She is not in acute distress.     Appearance: Normal appearance. She is well-developed. She is not ill-appearing.   HENT:      Head: Normocephalic and atraumatic.   Eyes:      General: No scleral icterus.     Conjunctiva/sclera: Conjunctivae normal.   Neck:      Thyroid: No thyromegaly.      Vascular: No carotid bruit.   Cardiovascular:      Rate and Rhythm: Normal rate and regular rhythm.      Pulses: Normal pulses.   Pulmonary:      Effort: Pulmonary effort is normal. No respiratory distress.      Breath sounds: No wheezing.   Abdominal:      Palpations: Abdomen is soft.      Tenderness: There is no abdominal tenderness.   Musculoskeletal:         General: No tenderness or deformity.      Right lower leg: No edema.      Left lower leg: No edema.   Lymphadenopathy:      Cervical: No cervical adenopathy.   Skin:     General: Skin is warm and dry.      Findings: Lesion  (Dysplastic melanocytic nevus behind right shoulder and raised lesion in right anticubital area - both need excision) present. No rash.   Neurological:      Mental Status: She is alert. Mental status is at baseline.      Cranial Nerves: No cranial nerve deficit.   Psychiatric:         Mood and Affect: Mood normal.         Behavior: Behavior normal.                    Signed Electronically by: Leonides Scott MD

## 2024-09-12 ENCOUNTER — OFFICE VISIT (OUTPATIENT)
Dept: SURGERY | Facility: OTHER | Age: 79
End: 2024-09-12
Attending: INTERNAL MEDICINE
Payer: MEDICARE

## 2024-09-12 VITALS
HEIGHT: 63 IN | OXYGEN SATURATION: 98 % | BODY MASS INDEX: 21.79 KG/M2 | HEART RATE: 53 BPM | RESPIRATION RATE: 16 BRPM | SYSTOLIC BLOOD PRESSURE: 126 MMHG | DIASTOLIC BLOOD PRESSURE: 72 MMHG | TEMPERATURE: 98.2 F | WEIGHT: 123 LBS

## 2024-09-12 DIAGNOSIS — D22.9 MELANOCYTIC NEVUS OF SKIN: ICD-10-CM

## 2024-09-12 DIAGNOSIS — L98.9 SKIN LESION OF RIGHT ARM: Primary | ICD-10-CM

## 2024-09-12 PROCEDURE — 11400 EXC TR-EXT B9+MARG 0.5 CM<: CPT | Performed by: SURGERY

## 2024-09-12 PROCEDURE — 88305 TISSUE EXAM BY PATHOLOGIST: CPT

## 2024-09-12 PROCEDURE — 11601 EXC TR-EXT MAL+MARG 0.6-1 CM: CPT | Performed by: SURGERY

## 2024-09-12 PROCEDURE — G0463 HOSPITAL OUTPT CLINIC VISIT: HCPCS | Mod: 25

## 2024-09-12 RX ORDER — MONTELUKAST SODIUM 10 MG/1
1 TABLET ORAL AT BEDTIME
COMMUNITY
Start: 2024-01-04

## 2024-09-12 RX ORDER — BACLOFEN 5 MG/1
TABLET ORAL
COMMUNITY
Start: 2024-03-14

## 2024-09-12 RX ORDER — PRAVASTATIN SODIUM 20 MG
1 TABLET ORAL EVERY EVENING
COMMUNITY
Start: 2024-01-04

## 2024-09-12 ASSESSMENT — PAIN SCALES - GENERAL: PAINLEVEL: NO PAIN (0)

## 2024-09-12 NOTE — PROGRESS NOTES
Procedure Note  Pre/Post Operative Diagnosis:   1. 1 cm ( with margins) skin lesion right arm near elbow  2. 0.5 cm ( with margin) skin lesion right arm near shoulder    Procedure:    Excision x 2    Surgeon: Martin Antonio MD FACS    Local Anesthesia: 1% lidocaine with0.25%Marcaine with epinephrine    Indication for the procedure:    This is a 78 year old female patient referred by Leonides Scott  with small pigmented skin lesion and raised pink skin lesion of right arm.    After explaining the risks to include bleeding, infection, recurrence or need for re-excision,and scarring the patient wished to proceed.    Procedure:   Each area was prepped and draped in usual sterile fashion with ChloraPrep . After, adequate local anesthesia,an elliptical skin incision was made to encompass each lesion. The elbow dermis was approximated with 3-0 Vicryl suture. The skin was closed with 4-0 Monocryl.  Glue and a proxi-strip and a clean dry dressing was applied.    Plan:   Patient will followup if there any problems with the wound including redness or drainage.

## 2024-09-12 NOTE — NURSING NOTE
"Chief Complaint   Patient presents with    Procedure     Skin lesion - R arm - dark mole back upper right, red patchy lesion on Right forearm       Initial /72 (BP Location: Right arm, Patient Position: Sitting, Cuff Size: Adult Regular)   Pulse 53   Temp 98.2  F (36.8  C) (Tympanic)   Resp 16   Ht 1.6 m (5' 3\")   Wt 55.8 kg (123 lb)   LMP 09/12/1998 (Approximate)   SpO2 98%   Breastfeeding No   BMI 21.79 kg/m   Estimated body mass index is 21.79 kg/m  as calculated from the following:    Height as of this encounter: 1.6 m (5' 3\").    Weight as of this encounter: 55.8 kg (123 lb).    Medication Review: complete    Health Care Directive:  Patient does not have a Health Care Directive or Living Will: Patient states has Advance Directive and will bring in a copy to clinic.    Bing Elias LPN      "

## 2024-09-12 NOTE — PROGRESS NOTES
TIMEOUT    Universal Protocol    A. Pre-procedure verification complete   1-relevant information / documentation available, reviewed and properly matched to the patient; 2-consent accurate and complete, 3-equipment and supplies available    B. Site marking complete   Site marked if not in continuous attendance with patient    C. TIME OUT completed   Time Out was conducted just prior to starting procedure to verify the eight required elements: 1-patient identity, 2-consent accurate and complete, 3-position, 4-correct side/site marked (if applicable), 5-procedure, 6-relevant images / results properly labeled and displayed (if applicable), 7-antibiotics / irrigation fluids (if applicable), 8-safety precautions.    Surgical Nurse  ....................  9/12/2024   10:37 AM

## 2024-09-12 NOTE — PATIENT INSTRUCTIONS
Instructions:    Your incision was closed with stitches that will dissolve. A surgical glue was used to help keep the incision closed. Do not apply any Vaseline, triple antibiotics, bacitracin, or any product on the glue as this may soften and create it to be sticky which may cause skin irritation.     It is ok to get the incision wet in the shower on the day after your procedure. Pat dry the area. Do not rub. Don't soak in a tub, pool or lake for 7 days.     Pathology can take up to 7-10 days to be completed. Once pathology is reviewed by the provider we will give you a call, letter, or MyChart message with the results. You may see the results prior to the provider reviewing them through your MyChart.       Monitor for any signs of infection:     redness in the area of the wound, particularly if it spreads or forms a red streak  swelling or warmth in the affected area  pain or tenderness at or around the site of the wound  pus forming around or oozing from the wound  fever  delayed wound healing      Please call the General Surgery office and ask for a nurse in unit 4S with problems, questions, or concerns at [phone].    General Surgery Nurses  Martha Worthington LPN

## 2024-09-18 PROBLEM — Z85.828 H/O BASAL CELL CARCINOMA EXCISION: Status: ACTIVE | Noted: 2024-09-12

## 2024-09-18 PROBLEM — Z98.890 H/O BASAL CELL CARCINOMA EXCISION: Status: ACTIVE | Noted: 2024-09-12

## 2024-09-18 LAB
PATH REPORT.COMMENTS IMP SPEC: NORMAL
PATH REPORT.FINAL DX SPEC: NORMAL
PHOTO IMAGE: NORMAL

## 2024-09-22 ENCOUNTER — OFFICE VISIT (OUTPATIENT)
Dept: FAMILY MEDICINE | Facility: OTHER | Age: 79
End: 2024-09-22
Payer: COMMERCIAL

## 2024-09-22 VITALS
DIASTOLIC BLOOD PRESSURE: 84 MMHG | WEIGHT: 124.2 LBS | BODY MASS INDEX: 22.01 KG/M2 | HEIGHT: 63 IN | SYSTOLIC BLOOD PRESSURE: 134 MMHG | RESPIRATION RATE: 16 BRPM | HEART RATE: 64 BPM | TEMPERATURE: 99.5 F | OXYGEN SATURATION: 96 %

## 2024-09-22 DIAGNOSIS — R30.0 DYSURIA: ICD-10-CM

## 2024-09-22 DIAGNOSIS — M25.561 PAIN AND SWELLING OF RIGHT KNEE: ICD-10-CM

## 2024-09-22 DIAGNOSIS — M25.461 PAIN AND SWELLING OF RIGHT KNEE: ICD-10-CM

## 2024-09-22 DIAGNOSIS — R39.9 SYMPTOMS OF URINARY TRACT INFECTION: Primary | ICD-10-CM

## 2024-09-22 DIAGNOSIS — M54.42 ACUTE BILATERAL LOW BACK PAIN WITH LEFT-SIDED SCIATICA: ICD-10-CM

## 2024-09-22 DIAGNOSIS — R50.9 FEVER, UNSPECIFIED FEVER CAUSE: ICD-10-CM

## 2024-09-22 DIAGNOSIS — R79.82 ELEVATED C-REACTIVE PROTEIN (CRP): ICD-10-CM

## 2024-09-22 LAB
ALBUMIN UR-MCNC: NEGATIVE MG/DL
ANION GAP SERPL CALCULATED.3IONS-SCNC: 12 MMOL/L (ref 7–15)
APPEARANCE UR: CLEAR
BILIRUB UR QL STRIP: NEGATIVE
BUN SERPL-MCNC: 9.4 MG/DL (ref 8–23)
CALCIUM SERPL-MCNC: 9.6 MG/DL (ref 8.8–10.4)
CHLORIDE SERPL-SCNC: 96 MMOL/L (ref 98–107)
COLOR UR AUTO: YELLOW
CREAT SERPL-MCNC: 0.59 MG/DL (ref 0.51–0.95)
CRP SERPL-MCNC: 87.48 MG/L
EGFRCR SERPLBLD CKD-EPI 2021: >90 ML/MIN/1.73M2
ERYTHROCYTE [DISTWIDTH] IN BLOOD BY AUTOMATED COUNT: 12.2 % (ref 10–15)
ERYTHROCYTE [SEDIMENTATION RATE] IN BLOOD BY WESTERGREN METHOD: 11 MM/HR (ref 0–30)
GLUCOSE SERPL-MCNC: 125 MG/DL (ref 70–99)
GLUCOSE UR STRIP-MCNC: NEGATIVE MG/DL
HCO3 SERPL-SCNC: 26 MMOL/L (ref 22–29)
HCT VFR BLD AUTO: 41.1 % (ref 35–47)
HGB BLD-MCNC: 13.7 G/DL (ref 11.7–15.7)
HGB UR QL STRIP: ABNORMAL
KETONES UR STRIP-MCNC: NEGATIVE MG/DL
LEUKOCYTE ESTERASE UR QL STRIP: NEGATIVE
MCH RBC QN AUTO: 31.1 PG (ref 26.5–33)
MCHC RBC AUTO-ENTMCNC: 33.3 G/DL (ref 31.5–36.5)
MCV RBC AUTO: 93 FL (ref 78–100)
NITRATE UR QL: NEGATIVE
PH UR STRIP: 5.5 [PH] (ref 5–9)
PLATELET # BLD AUTO: 263 10E3/UL (ref 150–450)
POTASSIUM SERPL-SCNC: 4 MMOL/L (ref 3.4–5.3)
RBC # BLD AUTO: 4.41 10E6/UL (ref 3.8–5.2)
RBC URINE: <1 /HPF
SODIUM SERPL-SCNC: 134 MMOL/L (ref 135–145)
SP GR UR STRIP: 1 (ref 1–1.03)
URATE SERPL-MCNC: 2.9 MG/DL (ref 2.4–5.7)
UROBILINOGEN UR STRIP-MCNC: NORMAL MG/DL
WBC # BLD AUTO: 9.2 10E3/UL (ref 4–11)
WBC URINE: <1 /HPF

## 2024-09-22 PROCEDURE — 86431 RHEUMATOID FACTOR QUANT: CPT | Mod: ZL

## 2024-09-22 PROCEDURE — 99214 OFFICE O/P EST MOD 30 MIN: CPT

## 2024-09-22 PROCEDURE — 85027 COMPLETE CBC AUTOMATED: CPT | Mod: ZL

## 2024-09-22 PROCEDURE — 86140 C-REACTIVE PROTEIN: CPT | Mod: ZL

## 2024-09-22 PROCEDURE — 84550 ASSAY OF BLOOD/URIC ACID: CPT | Mod: ZL

## 2024-09-22 PROCEDURE — G0463 HOSPITAL OUTPT CLINIC VISIT: HCPCS

## 2024-09-22 PROCEDURE — 85652 RBC SED RATE AUTOMATED: CPT | Mod: ZL

## 2024-09-22 PROCEDURE — 81001 URINALYSIS AUTO W/SCOPE: CPT | Mod: ZL

## 2024-09-22 PROCEDURE — 80048 BASIC METABOLIC PNL TOTAL CA: CPT | Mod: ZL

## 2024-09-22 PROCEDURE — 36415 COLL VENOUS BLD VENIPUNCTURE: CPT | Mod: ZL

## 2024-09-22 ASSESSMENT — PAIN SCALES - GENERAL: PAINLEVEL: EXTREME PAIN (8)

## 2024-09-22 NOTE — PROGRESS NOTES
Subjective   Sherri is a 78 year old, presenting for the following health issues:  UTI (UTI Sx x 4-5 Days )      ICD-10-CM    1. Symptoms of urinary tract infection  R39.9 CBC W PLT No Diff     Basic Metabolic Panel     Basic Metabolic Panel     CBC W PLT No Diff     CANCELED: CT Urogram wo & w Contrast      2. Dysuria  R30.0 UA with Microscopic reflex to Culture     Basic Metabolic Panel     Basic Metabolic Panel      3. Fever, unspecified fever cause  R50.9 CBC W PLT No Diff     Basic Metabolic Panel     Basic Metabolic Panel     CBC W PLT No Diff     CANCELED: CT Urogram wo & w Contrast      4. Acute bilateral low back pain with left-sided sciatica  M54.42       5. Pain and swelling of right knee  M25.561 CRP inflammation    M25.461 Sedimentation Rate (ESR)     Uric acid     Uric acid     Sedimentation Rate (ESR)     CRP inflammation      6. Elevated C-reactive protein (CRP)  R79.82 Rheumatoid factor     CANCELED: Anaplasma phagocytoph Antibodies IgG IgM      Sherri is a 78-year-old female patient who presents to the clinic today for concerns of urinary tract infection.  Symptoms began approximately 4 days ago and urine appeared orange in color today at home.  She did do an at home UA which showed leukocytes and blood.  She was recently hospitalized in June with sepsis due to pyelonephritis, stroke, and A-fib.  She does endorse decreased appetite for the last couple days however she is drinking adequate fluids.  She complains of mid low back pain.  UA was negative today.  CBC and BMP unremarkable.    She is also complaining of right knee pain and swelling.  This is new for her.  She states that she did have left knee pain and swelling which resolved on its own.  When she was hospitalized in June she had testing for tickborne disease which was negative. She was also tested for Lupus which showed elevated PTT ratio and platelet neutralization with positive lupus result. CRP is quite elevated today at 87.48.  ESR  "normal.  Uric acid level normal.  I recommend she follow-up with her PCP in the clinic for further diagnostic testing.  I also added on an R factor to the labs today.  Would recommend RACH for further workup.  HPI     Low, mid back pain that started in the last few days.     Fever started last night.   Incontinence for 4 days  Orange colored urine today.    Test at home for UTI showed leukocytes and blood.   Hospitalization in June with sepsis, pyelonephritis, stroke, afib  Left flank pain.    Decreased appetite for a couple days.  Drinking water         Objective    /84 (BP Location: Right arm, Patient Position: Chair, Cuff Size: Adult Regular)   Pulse 64   Temp 99.5  F (37.5  C) (Tympanic)   Resp 16   Ht 1.6 m (5' 3\")   Wt 56.3 kg (124 lb 3.2 oz)   LMP 09/12/1998 (Approximate)   SpO2 96%   BMI 22.00 kg/m    Body mass index is 22 kg/m .  Physical Exam  Constitutional:       General: She is not in acute distress.     Appearance: Normal appearance. She is normal weight. She is not ill-appearing.   HENT:      Head: Normocephalic.   Cardiovascular:      Rate and Rhythm: Normal rate and regular rhythm.      Pulses: Normal pulses.      Heart sounds: Normal heart sounds.   Pulmonary:      Effort: Pulmonary effort is normal.      Breath sounds: Normal breath sounds.   Musculoskeletal:         General: Swelling (right knee) and tenderness (to palpation of mid back and left flank) present.   Skin:     General: Skin is warm and dry.      Findings: No lesion or rash.   Neurological:      Mental Status: She is alert and oriented to person, place, and time.   Psychiatric:         Behavior: Behavior normal.        Results for orders placed or performed in visit on 09/22/24   UA with Microscopic reflex to Culture     Status: Abnormal    Specimen: Urine, Midstream   Result Value Ref Range    Color Urine Yellow Colorless, Straw, Light Yellow, Yellow    Appearance Urine Clear Clear    Glucose Urine Negative Negative " mg/dL    Bilirubin Urine Negative Negative    Ketones Urine Negative Negative mg/dL    Specific Gravity Urine 1.003 1.000 - 1.030    Blood Urine Small (A) Negative    pH Urine 5.5 5.0 - 9.0    Protein Albumin Urine Negative Negative mg/dL    Urobilinogen Urine Normal Normal, 2.0 mg/dL    Nitrite Urine Negative Negative    Leukocyte Esterase Urine Negative Negative    RBC Urine <1 <=2 /HPF    WBC Urine <1 <=5 /HPF    Narrative    Urine Culture not indicated   Uric acid     Status: Normal   Result Value Ref Range    Uric Acid 2.9 2.4 - 5.7 mg/dL   Sedimentation Rate (ESR)     Status: Normal   Result Value Ref Range    Erythrocyte Sedimentation Rate 11 0 - 30 mm/hr   CRP inflammation     Status: Abnormal   Result Value Ref Range    CRP Inflammation 87.48 (H) <5.00 mg/L   Basic Metabolic Panel     Status: Abnormal   Result Value Ref Range    Sodium 134 (L) 135 - 145 mmol/L    Potassium 4.0 3.4 - 5.3 mmol/L    Chloride 96 (L) 98 - 107 mmol/L    Carbon Dioxide (CO2) 26 22 - 29 mmol/L    Anion Gap 12 7 - 15 mmol/L    Urea Nitrogen 9.4 8.0 - 23.0 mg/dL    Creatinine 0.59 0.51 - 0.95 mg/dL    GFR Estimate >90 >60 mL/min/1.73m2    Calcium 9.6 8.8 - 10.4 mg/dL    Glucose 125 (H) 70 - 99 mg/dL   CBC W PLT No Diff     Status: Normal   Result Value Ref Range    WBC Count 9.2 4.0 - 11.0 10e3/uL    RBC Count 4.41 3.80 - 5.20 10e6/uL    Hemoglobin 13.7 11.7 - 15.7 g/dL    Hematocrit 41.1 35.0 - 47.0 %    MCV 93 78 - 100 fL    MCH 31.1 26.5 - 33.0 pg    MCHC 33.3 31.5 - 36.5 g/dL    RDW 12.2 10.0 - 15.0 %    Platelet Count 263 150 - 450 10e3/uL       Signed Electronically by: PANCHITO Ling CNP

## 2024-09-22 NOTE — NURSING NOTE
"Chief Complaint   Patient presents with    UTI     UTI Sx x 4-5 Days        Initial /84 (BP Location: Right arm, Patient Position: Chair, Cuff Size: Adult Regular)   Pulse 64   Temp 99.5  F (37.5  C) (Tympanic)   Resp 16   Ht 1.6 m (5' 3\")   Wt 56.3 kg (124 lb 3.2 oz)   LMP 09/12/1998 (Approximate)   SpO2 96%   BMI 22.00 kg/m   Estimated body mass index is 22 kg/m  as calculated from the following:    Height as of this encounter: 1.6 m (5' 3\").    Weight as of this encounter: 56.3 kg (124 lb 3.2 oz).      Medication Reconciliation: Complete.       Cassandra Christopher LPN on 9/22/2024 at 11:30 AM     "

## 2024-09-23 ENCOUNTER — TELEPHONE (OUTPATIENT)
Dept: INTERNAL MEDICINE | Facility: OTHER | Age: 79
End: 2024-09-23
Payer: COMMERCIAL

## 2024-09-23 LAB — RHEUMATOID FACT SERPL-ACNC: <10 IU/ML

## 2024-09-23 NOTE — TELEPHONE ENCOUNTER
Patient called and told us Dr Reyes was going to work her in ? tomorrow.  I do not see any notes to schedule.          Karla Vegas on 9/23/2024 at 8:16 AM    
Patient was contacted and an appointment was made with Scarlett Reyes for 9.24.2024.  Beatris Aquino on 9/23/2024 at 10:39 AM    
Please assist in working this patient into my schedule. Any time that is open or a 1:40. Thanks!  
No

## 2024-09-24 ENCOUNTER — HOSPITAL ENCOUNTER (OUTPATIENT)
Dept: GENERAL RADIOLOGY | Facility: OTHER | Age: 79
Discharge: HOME | End: 2024-09-24
Payer: MEDICARE

## 2024-09-24 ENCOUNTER — OFFICE VISIT (OUTPATIENT)
Dept: INTERNAL MEDICINE | Facility: OTHER | Age: 79
End: 2024-09-24
Payer: MEDICARE

## 2024-09-24 VITALS
HEART RATE: 56 BPM | HEIGHT: 63 IN | RESPIRATION RATE: 12 BRPM | OXYGEN SATURATION: 98 % | SYSTOLIC BLOOD PRESSURE: 127 MMHG | DIASTOLIC BLOOD PRESSURE: 64 MMHG | BODY MASS INDEX: 21.76 KG/M2 | TEMPERATURE: 97.8 F | WEIGHT: 122.8 LBS

## 2024-09-24 DIAGNOSIS — M25.461 PAIN AND SWELLING OF RIGHT KNEE: ICD-10-CM

## 2024-09-24 DIAGNOSIS — L93.0 LUPUS ERYTHEMATOSUS, UNSPECIFIED FORM: ICD-10-CM

## 2024-09-24 DIAGNOSIS — M54.50 ACUTE MIDLINE LOW BACK PAIN WITHOUT SCIATICA: ICD-10-CM

## 2024-09-24 DIAGNOSIS — M25.461 PAIN AND SWELLING OF RIGHT KNEE: Primary | ICD-10-CM

## 2024-09-24 DIAGNOSIS — M25.561 PAIN AND SWELLING OF RIGHT KNEE: Primary | ICD-10-CM

## 2024-09-24 DIAGNOSIS — M25.561 PAIN AND SWELLING OF RIGHT KNEE: ICD-10-CM

## 2024-09-24 PROCEDURE — G0463 HOSPITAL OUTPT CLINIC VISIT: HCPCS

## 2024-09-24 PROCEDURE — 86225 DNA ANTIBODY NATIVE: CPT | Mod: ZL

## 2024-09-24 PROCEDURE — 72100 X-RAY EXAM L-S SPINE 2/3 VWS: CPT

## 2024-09-24 PROCEDURE — G0463 HOSPITAL OUTPT CLINIC VISIT: HCPCS | Mod: 25

## 2024-09-24 PROCEDURE — 86038 ANTINUCLEAR ANTIBODIES: CPT | Mod: ZL

## 2024-09-24 PROCEDURE — 73562 X-RAY EXAM OF KNEE 3: CPT | Mod: RT

## 2024-09-24 PROCEDURE — G2211 COMPLEX E/M VISIT ADD ON: HCPCS

## 2024-09-24 PROCEDURE — 99214 OFFICE O/P EST MOD 30 MIN: CPT

## 2024-09-24 PROCEDURE — 36415 COLL VENOUS BLD VENIPUNCTURE: CPT | Mod: ZL

## 2024-09-24 PROCEDURE — 86200 CCP ANTIBODY: CPT | Mod: ZL

## 2024-09-24 RX ORDER — PREDNISONE 10 MG/1
TABLET ORAL
Qty: 30 TABLET | Refills: 0 | Status: SHIPPED | OUTPATIENT
Start: 2024-09-24

## 2024-09-24 ASSESSMENT — PAIN SCALES - GENERAL: PAINLEVEL: SEVERE PAIN (7)

## 2024-09-24 NOTE — NURSING NOTE
"Chief Complaint   Patient presents with    Follow Up     Right knee, lower left back and \"on the spine\"   Patient declined vaccines today.    Initial /64 (BP Location: Right arm, Patient Position: Sitting, Cuff Size: Adult Regular)   Pulse 56   Temp 97.8  F (36.6  C) (Tympanic)   Resp 12   Ht 1.588 m (5' 2.5\")   Wt 55.7 kg (122 lb 12.8 oz)   LMP 09/12/1998 (Approximate)   SpO2 98%   Breastfeeding No   BMI 22.10 kg/m   Estimated body mass index is 22.1 kg/m  as calculated from the following:    Height as of this encounter: 1.588 m (5' 2.5\").    Weight as of this encounter: 55.7 kg (122 lb 12.8 oz).  Medication Review: complete    The next two questions are to help us understand your food security.  If you are feeling you need any assistance in this area, we have resources available to support you today.           No data to display            Patient does not have any concerns with a food shortage.        Health Care Directive:  Patient does not have a Health Care Directive or Living Will: Patient will bring a copy of her [facility] plan in for her chart.    Erum Romano      "

## 2024-09-24 NOTE — PATIENT INSTRUCTIONS
Referral placed to rheumatology -  they will call you to schedule but they are booked a long ways out.    Xrays and labs today. I will call you with results. Lab results may take a couple days.     They will also call to schedule joint aspiration.     Alternate heat and ice. Heat may be better.

## 2024-09-24 NOTE — PROGRESS NOTES
"  Sarah Polanco is a 78 year old, presenting for the following health issues:  Follow Up (Right knee, lower left back and \"on the spine\")      9/24/2024     1:16 PM   Additional Questions   Roomed by Erum BROWN   Accompanied by  - Jose D       ICD-10-CM    1. Pain and swelling of right knee  M25.561 DNA double stranded antibodies    M25.461 Cyclic Citrullinated Peptide Antibody IgG     Anti Nuclear Berenice IgG by IFA with Reflex     XR Knee Right 3 Views     DNA double stranded antibodies     Cyclic Citrullinated Peptide Antibody IgG     Anti Nuclear Berenice IgG by IFA with Reflex     Orthopedic  Referral     predniSONE (DELTASONE) 10 MG tablet     CANCELED: ARTHROCENTESIS ASPIR&/INJ MAJOR JT/BURSA W/US      2. Lupus erythematosus, unspecified form  L93.0 Adult Rheumatology  Referral      3. Acute midline low back pain without sciatica  M54.50 XR Lumbar Spine 2/3 Views      Sherri Bennett is a 78-year-old female patient who presents to the clinic today with acute pain and swelling of her right knee as well as pain to the low mid back.  This started recently.  Of note she was hospitalized in June with sepsis due to pyelonephrosis and had pain and swelling in the left knee and hip.  At that time she was tested for tickborne disease as well as lupus.  She did have a positive lupus test at that time.  Pain has migrated to the right now.  Further workup revealed elevated CRP, negative RF factor, normal ESR.  Will continue with diagnostic workup today including referral for rheumatology as well as referral for arthrocentesis of the right knee with possible steroid injection.  I did start her on a  steroid taper to help decrease inflammation and provide more comfort for her.  I did discuss her case with Dr. Craig  who agrees with the plan.     The longitudinal plan of care for the diagnosis(es)/condition(s) as documented were addressed during this visit. Due to the added complexity in care, I will " continue to support Sherri in the subsequent management and with ongoing continuity of care.    History of Present Illness       Back Pain:  She presents for follow up of back pain. Patient's back pain is a new problem.    Original cause of back pain: not sure  First noticed back pain: in the last week  Patient feels back pain: constantlyLocation of back pain:  Left lower back and other  Description of back pain: burning, dull ache and stabbing  Back pain spreads: nowhere    Since patient first noticed back pain, pain is: gradually worsening  Does back pain interfere with her job:  Yes  On a scale of 1-10 (10 being the worst), patient describes pain as:  8  What makes back pain worse: bending, certain positions, sitting and standing   Acupuncture: not tried  Acetaminophen: helpful  Activity or exercise: not tried  Chiropractor:  Not tried  Cold: helpful  Heat: helpful  Massage: not tried  Muscle relaxants: helpful  NSAIDS: not tried  Opioids: not tried  Physical Therapy: not tried  Rest: helpful  Steroid Injection: not tried  Stretching: helpful  Surgery: not tried  TENS unit: not tried  Topical pain relievers: helpful  Other healthcare providers patient is seeing for back pain: None    She eats 4 or more servings of fruits and vegetables daily.She consumes 0 sweetened beverage(s) daily.She exercises with enough effort to increase her heart rate 20 to 29 minutes per day.  She exercises with enough effort to increase her heart rate 3 or less days per week.   She is taking medications regularly.     Review of Systems  CONSTITUTIONAL: NEGATIVE for fever, chills, change in weight  INTEGUMENTARY/SKIN: NEGATIVE for worrisome rashes, moles or lesions  ENT/MOUTH: NEGATIVE for ear, mouth and throat problems  RESP: NEGATIVE for significant cough or SOB  CV: NEGATIVE for chest pain, palpitations or peripheral edema  GI: NEGATIVE for nausea, abdominal pain, heartburn, or change in bowel habits  : NEGATIVE for frequency,  "dysuria, or hematuria  MUSCULOSKELETAL:POSITIVE  for back pain to low back, edema to right knee, joint pain to right knee, and NEGATIVE for joint instability, joint warmth , muscle weakness, and radicular pain  NEURO: NEGATIVE for weakness, dizziness or paresthesias  ENDOCRINE: NEGATIVE for temperature intolerance, skin/hair changes  HEME: NEGATIVE for bleeding problems  PSYCHIATRIC: NEGATIVE for changes in mood or affect      Objective    /64 (BP Location: Right arm, Patient Position: Sitting, Cuff Size: Adult Regular)   Pulse 56   Temp 97.8  F (36.6  C) (Tympanic)   Resp 12   Ht 1.588 m (5' 2.5\")   Wt 55.7 kg (122 lb 12.8 oz)   LMP 09/12/1998 (Approximate)   SpO2 98%   Breastfeeding No   BMI 22.10 kg/m    Body mass index is 22.1 kg/m .  Physical Exam  Constitutional:       General: She is not in acute distress.     Appearance: Normal appearance. She is not ill-appearing.   HENT:      Head: Normocephalic.   Cardiovascular:      Rate and Rhythm: Regular rhythm. Bradycardia present.      Pulses: Normal pulses.      Heart sounds: Normal heart sounds.   Pulmonary:      Effort: Pulmonary effort is normal.      Breath sounds: Normal breath sounds.   Musculoskeletal:         General: Swelling (Right knee) and tenderness (To palpation of low mid back) present. No deformity or signs of injury.      Right lower leg: No edema.      Left lower leg: No edema.   Skin:     General: Skin is warm and dry.      Coloration: Skin is not jaundiced or pale.      Findings: No bruising, erythema, lesion or rash.   Neurological:      Mental Status: She is alert and oriented to person, place, and time.   Psychiatric:         Behavior: Behavior normal.          Signed Electronically by: PANCHITO Ling CNP    "

## 2024-09-26 LAB
ANA SER QL IF: NEGATIVE
CCP AB SER IA-ACNC: 1.1 U/ML
DSDNA AB SER-ACNC: 1 IU/ML

## 2024-09-27 ENCOUNTER — MYC MEDICAL ADVICE (OUTPATIENT)
Dept: INTERNAL MEDICINE | Facility: OTHER | Age: 79
End: 2024-09-27
Payer: COMMERCIAL

## 2024-09-27 NOTE — TELEPHONE ENCOUNTER
Everything else has come back negative. You should be getting a call to have your knee tapped and injected. I also want you to continue with the referral to rheumatology and know that they are booking out quite a ways as we discussed in the clinic. I hope that the steroids that I sent you will give you relief soon. Please let me know if there is anything else I can do for you.

## 2024-09-27 NOTE — TELEPHONE ENCOUNTER
Wants to verify that the labs and imaging are negative for Lupus, RA, and Lyme Disease?     Pt has OV on 9/24 for acute pain/swelling of R knee.     Routing to provider to review and respond.  Nehemiah Berger RN on 9/27/2024 at 9:52 AM

## 2024-10-15 ENCOUNTER — OFFICE VISIT (OUTPATIENT)
Dept: INTERNAL MEDICINE | Facility: OTHER | Age: 79
End: 2024-10-15
Payer: MEDICARE

## 2024-10-15 ENCOUNTER — PATIENT OUTREACH (OUTPATIENT)
Dept: ONCOLOGY | Facility: OTHER | Age: 79
End: 2024-10-15

## 2024-10-15 VITALS
WEIGHT: 123.6 LBS | RESPIRATION RATE: 16 BRPM | BODY MASS INDEX: 22.25 KG/M2 | TEMPERATURE: 97.1 F | DIASTOLIC BLOOD PRESSURE: 60 MMHG | SYSTOLIC BLOOD PRESSURE: 132 MMHG | HEART RATE: 61 BPM | OXYGEN SATURATION: 99 %

## 2024-10-15 DIAGNOSIS — G89.29 CHRONIC NECK PAIN: ICD-10-CM

## 2024-10-15 DIAGNOSIS — S30.860A TICK BITE OF BUTTOCK, INITIAL ENCOUNTER: ICD-10-CM

## 2024-10-15 DIAGNOSIS — Z23 NEED FOR INFLUENZA VACCINATION: ICD-10-CM

## 2024-10-15 DIAGNOSIS — Z23 NEED FOR COVID-19 VACCINE: ICD-10-CM

## 2024-10-15 DIAGNOSIS — M54.2 CHRONIC NECK PAIN: ICD-10-CM
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
## 2024-10-15 DIAGNOSIS — L93.0 LUPUS ERYTHEMATOSUS, UNSPECIFIED FORM: Primary | ICD-10-CM

## 2024-10-15 PROCEDURE — G0463 HOSPITAL OUTPT CLINIC VISIT: HCPCS | Mod: 25

## 2024-10-15 PROCEDURE — G0008 ADMIN INFLUENZA VIRUS VAC: HCPCS

## 2024-10-15 PROCEDURE — 90480 ADMN SARSCOV2 VAC 1/ONLY CMP: CPT

## 2024-10-15 PROCEDURE — G2211 COMPLEX E/M VISIT ADD ON: HCPCS

## 2024-10-15 PROCEDURE — 99213 OFFICE O/P EST LOW 20 MIN: CPT

## 2024-10-15 RX ORDER — BACLOFEN 10 MG/1
10 TABLET ORAL 2 TIMES DAILY
Qty: 90 TABLET | Refills: 4 | Status: SHIPPED | OUTPATIENT
Start: 2024-10-15

## 2024-10-15 ASSESSMENT — PAIN SCALES - GENERAL: PAINLEVEL: MILD PAIN (3)

## 2024-10-15 NOTE — PATIENT INSTRUCTIONS
Watch for any concerns regarding the tick bite.    I sent a referral in for hematology regarding your joint pain. They will call you to schedule.     Let me know if you need anything else in the mean time.

## 2024-10-15 NOTE — PROGRESS NOTES
Oncology/Hematology Care Coordination - Referral Review    Referred by:  Scarlett Reyes    Diagnosis:  lupua erythematosus    Most recent Imaging:      Most recent Lab:  9/22, 9/24    Surgery/Biopsy:      Pathology:      Outside Records:      Lianne Monge RN on 10/15/2024 at 12:17 PM

## 2024-10-15 NOTE — NURSING NOTE
"Chief Complaint   Patient presents with    RECHECK     Rheumatoid arthritis   Patient presents to the clinic today for a recheck for rheumatoid arthritis    Initial LMP 09/12/1998 (Approximate)  Estimated body mass index is 22.1 kg/m  as calculated from the following:    Height as of 9/24/24: 1.588 m (5' 2.5\").    Weight as of 9/24/24: 55.7 kg (122 lb 12.8 oz).  Meds Reconciled: complete      Mayda Rockwell LPN,LPN on 10/15/2024 at 9:08 AM  Ext. [phone]        Mayda Rockwell LPN  "

## 2024-10-15 NOTE — PROGRESS NOTES
Subjective   Sherri is a 78 year old, presenting for the following health issues:  RECHECK (Rheumatoid arthritis)      10/15/2024     9:08 AM   Additional Questions   Roomed by Mayda GARCIA      Diagnosis Comments   1. Lupus erythematosus, unspecified form  Adult Oncology/Hematology  Referral       2. Tick bite of buttock, initial encounter        3. Need for COVID-19 vaccine  COVID-19 12+ (PFIZER)       4. Need for influenza vaccination  INFLUENZA HIGH DOSE, TRIVALENT, PF (FLUZONE)        1. Patient presents with her  for continued follow up of multiple joint pain. She is currently experiencing pain in her right knee and right ankle. When she saw me last I gave her a steroid taper and a referral for to see ortho to have her knee potentially drained and injected. She states that she felt much better with steroids but pain returned as soon as the taper was complete. She has an appointment to see ortho on 10/21. I placed a referral for rheumatology as well, however, after their review of her chart they recommended a referral to hematology. This was placed today. Will increase baclofen to BID from daily to help with pain control.     She found an attached tick to her right gluteal cleft. This was removed on 10/10. She is not sure how long it had been attached. No fevers or secondary signs of infection at the site. She is to monitor for any concerns. No prophylactic doxy given do to length of time since tick was removed. She can return to the clinic with any other concerns.     The longitudinal plan of care for the diagnosis(es)/condition(s) as documented were addressed during this visit. Due to the added complexity in care, I will continue to support Sherri in the subsequent management and with ongoing continuity of care.    History of Present Illness       Reason for visit:  Daina consumes 0 sweetened beverage(s) daily.She exercises with enough effort to increase her heart rate 9 or less minutes per  day.  She exercises with enough effort to increase her heart rate 3 or less days per week.   She is taking medications regularly.       Felt better while on prednisone dose. Now having return of symptoms of pain in the right knee as well as the right ankle.   Pain is 6/10.   Utilizing a pain patch on her right knee.  Utilizing a cane for ambulation      Review of Systems  Constitutional, HEENT, cardiovascular, pulmonary, gi and gu systems are negative, except as otherwise noted.      Objective    LMP 09/12/1998 (Approximate)   There is no height or weight on file to calculate BMI.  Physical Exam  Constitutional:       General: She is not in acute distress.     Appearance: Normal appearance. She is not ill-appearing.   HENT:      Head: Normocephalic.   Cardiovascular:      Rate and Rhythm: Normal rate.      Pulses: Normal pulses.   Pulmonary:      Effort: Pulmonary effort is normal.   Musculoskeletal:         General: Swelling (right knee and right ankle) present.   Skin:     General: Skin is warm and dry.      Findings: No bruising, erythema, lesion or rash.   Neurological:      Mental Status: She is alert and oriented to person, place, and time.          Signed Electronically by: PANCHITO Ling CNP

## 2024-10-21 ENCOUNTER — OFFICE VISIT (OUTPATIENT)
Dept: ORTHOPEDICS | Facility: OTHER | Age: 79
End: 2024-10-21
Payer: MEDICARE

## 2024-10-21 VITALS
BODY MASS INDEX: 21.96 KG/M2 | WEIGHT: 122 LBS | HEART RATE: 59 BPM | OXYGEN SATURATION: 97 % | SYSTOLIC BLOOD PRESSURE: 130 MMHG | DIASTOLIC BLOOD PRESSURE: 80 MMHG

## 2024-10-21 DIAGNOSIS — M25.461 PAIN AND SWELLING OF RIGHT KNEE: ICD-10-CM

## 2024-10-21 DIAGNOSIS — M25.561 PAIN AND SWELLING OF RIGHT KNEE: ICD-10-CM

## 2024-10-21 PROCEDURE — 99203 OFFICE O/P NEW LOW 30 MIN: CPT | Mod: 25 | Performed by: ORTHOPAEDIC SURGERY

## 2024-10-21 PROCEDURE — G0463 HOSPITAL OUTPT CLINIC VISIT: HCPCS

## 2024-10-21 PROCEDURE — 20610 DRAIN/INJ JOINT/BURSA W/O US: CPT | Mod: RT | Performed by: ORTHOPAEDIC SURGERY

## 2024-10-21 PROCEDURE — 250N000011 HC RX IP 250 OP 636: Mod: JZ | Performed by: ORTHOPAEDIC SURGERY

## 2024-10-21 PROCEDURE — 250N000009 HC RX 250: Performed by: ORTHOPAEDIC SURGERY

## 2024-10-21 RX ORDER — LIDOCAINE HYDROCHLORIDE 10 MG/ML
4 INJECTION, SOLUTION EPIDURAL; INFILTRATION; INTRACAUDAL; PERINEURAL ONCE
Status: COMPLETED | OUTPATIENT
Start: 2024-10-21 | End: 2024-10-21

## 2024-10-21 RX ORDER — TRIAMCINOLONE ACETONIDE 40 MG/ML
40 INJECTION, SUSPENSION INTRA-ARTICULAR; INTRAMUSCULAR ONCE
Status: COMPLETED | OUTPATIENT
Start: 2024-10-21 | End: 2024-10-21

## 2024-10-21 RX ADMIN — TRIAMCINOLONE ACETONIDE 40 MG: 40 INJECTION, SUSPENSION INTRA-ARTICULAR; INTRAMUSCULAR at 10:51

## 2024-10-21 RX ADMIN — LIDOCAINE HYDROCHLORIDE 4 ML: 10 INJECTION, SOLUTION INFILTRATION; PERINEURAL at 10:51

## 2024-10-21 ASSESSMENT — PAIN SCALES - GENERAL: PAINLEVEL: WORST PAIN (10)

## 2024-10-21 NOTE — PROGRESS NOTES
Subjective:    78-year-old female with significant right knee pain.  She was given a Medrol Dosepak and this helped with her knee pain taking the swelling down from being fairly significant to being minimal.  She states that she had an injection into her knee many years ago but has not any time recently.  Some days she has difficulty bearing weight on this because of the pain.    Objective:    On examination today this is a 78-year-old female in no acute distress.  Very pleasant on examination.  She is thin.  She has full range of motion of her right knee.  No significant effusion.  She is tender to palpation the medial and lateral joint lines of her right knee.  Otherwise stable to varus and valgus stress and she is neurovascularly intact    Assessment:    78-year-old female with chondrocalcinosis of the right knee.  This predisposes her to developing significant osteoarthritis.    Plan:    We have given her an injection into the right knee today.  She tolerated this well and had good relief of her pain.  Will see her back on an as-needed basis.    A steroid injection was performed at right knee using 1% plain Lidocaine and 40 mg of Kenalog. This was well tolerated.

## 2024-10-30 ENCOUNTER — ONCOLOGY VISIT (OUTPATIENT)
Dept: ONCOLOGY | Facility: OTHER | Age: 79
End: 2024-10-30
Payer: MEDICARE

## 2024-10-30 VITALS
OXYGEN SATURATION: 97 % | DIASTOLIC BLOOD PRESSURE: 64 MMHG | SYSTOLIC BLOOD PRESSURE: 136 MMHG | HEART RATE: 55 BPM | TEMPERATURE: 98.5 F | WEIGHT: 123 LBS | RESPIRATION RATE: 16 BRPM | BODY MASS INDEX: 22.14 KG/M2

## 2024-10-30 DIAGNOSIS — L93.0 LUPUS ERYTHEMATOSUS, UNSPECIFIED FORM: ICD-10-CM

## 2024-10-30 DIAGNOSIS — R10.13 EPIGASTRIC PAIN: ICD-10-CM

## 2024-10-30 DIAGNOSIS — M89.8X9 BONE PAIN: Primary | ICD-10-CM

## 2024-10-30 DIAGNOSIS — R06.02 SOB (SHORTNESS OF BREATH): ICD-10-CM

## 2024-10-30 LAB
ALBUMIN SERPL BCG-MCNC: 4.4 G/DL (ref 3.5–5.2)
ALP SERPL-CCNC: 63 U/L (ref 40–150)
ALT SERPL W P-5'-P-CCNC: 17 U/L (ref 0–50)
ANION GAP SERPL CALCULATED.3IONS-SCNC: 10 MMOL/L (ref 7–15)
AST SERPL W P-5'-P-CCNC: 18 U/L (ref 0–45)
BASOPHILS # BLD AUTO: 0.1 10E3/UL (ref 0–0.2)
BASOPHILS NFR BLD AUTO: 1 %
BILIRUB SERPL-MCNC: 0.3 MG/DL
BUN SERPL-MCNC: 13.6 MG/DL (ref 8–23)
CALCIUM SERPL-MCNC: 9.2 MG/DL (ref 8.8–10.4)
CHLORIDE SERPL-SCNC: 105 MMOL/L (ref 98–107)
CREAT SERPL-MCNC: 0.64 MG/DL (ref 0.51–0.95)
CRP SERPL-MCNC: <3 MG/L
D DIMER PPP FEU-MCNC: 0.34 UG/ML FEU (ref 0–0.5)
EGFRCR SERPLBLD CKD-EPI 2021: >90 ML/MIN/1.73M2
EOSINOPHIL # BLD AUTO: 0.1 10E3/UL (ref 0–0.7)
EOSINOPHIL NFR BLD AUTO: 2 %
ERYTHROCYTE [DISTWIDTH] IN BLOOD BY AUTOMATED COUNT: 13.2 % (ref 10–15)
ERYTHROCYTE [SEDIMENTATION RATE] IN BLOOD BY WESTERGREN METHOD: 5 MM/HR (ref 0–30)
FACTOR 2 INTERPRETATION: NORMAL
FACTOR V INTERPRETATION: NORMAL
GLUCOSE SERPL-MCNC: 98 MG/DL (ref 70–99)
HCO3 SERPL-SCNC: 27 MMOL/L (ref 22–29)
HCT VFR BLD AUTO: 44.7 % (ref 35–47)
HGB BLD-MCNC: 14.7 G/DL (ref 11.7–15.7)
IMM GRANULOCYTES # BLD: 0.1 10E3/UL
IMM GRANULOCYTES NFR BLD: 1 %
LAB DIRECTOR COMMENTS: NORMAL
LAB DIRECTOR COMMENTS: NORMAL
LAB DIRECTOR DISCLAIMER: NORMAL
LAB DIRECTOR DISCLAIMER: NORMAL
LAB DIRECTOR INTERPRETATION: NORMAL
LAB DIRECTOR INTERPRETATION: NORMAL
LAB DIRECTOR METHODOLOGY: NORMAL
LAB DIRECTOR METHODOLOGY: NORMAL
LAB DIRECTOR RESULTS: NORMAL
LAB DIRECTOR RESULTS: NORMAL
LDH SERPL L TO P-CCNC: 183 U/L (ref 0–250)
LYMPHOCYTES # BLD AUTO: 1.7 10E3/UL (ref 0.8–5.3)
LYMPHOCYTES NFR BLD AUTO: 26 %
MCH RBC QN AUTO: 30.9 PG (ref 26.5–33)
MCHC RBC AUTO-ENTMCNC: 32.9 G/DL (ref 31.5–36.5)
MCV RBC AUTO: 94 FL (ref 78–100)
MONOCYTES # BLD AUTO: 0.5 10E3/UL (ref 0–1.3)
MONOCYTES NFR BLD AUTO: 8 %
NEUTROPHILS # BLD AUTO: 3.9 10E3/UL (ref 1.6–8.3)
NEUTROPHILS NFR BLD AUTO: 62 %
NRBC # BLD AUTO: 0 10E3/UL
NRBC BLD AUTO-RTO: 0 /100
PLATELET # BLD AUTO: 321 10E3/UL (ref 150–450)
POTASSIUM SERPL-SCNC: 4 MMOL/L (ref 3.4–5.3)
PROT SERPL-MCNC: 7.4 G/DL (ref 6.4–8.3)
RBC # BLD AUTO: 4.76 10E6/UL (ref 3.8–5.2)
SODIUM SERPL-SCNC: 142 MMOL/L (ref 135–145)
SPECIMEN DESCRIPTION: NORMAL
SPECIMEN DESCRIPTION: NORMAL
WBC # BLD AUTO: 6.3 10E3/UL (ref 4–11)

## 2024-10-30 PROCEDURE — 83090 ASSAY OF HOMOCYSTEINE: CPT | Mod: ZL | Performed by: INTERNAL MEDICINE

## 2024-10-30 PROCEDURE — 85670 THROMBIN TIME PLASMA: CPT | Mod: ZL | Performed by: INTERNAL MEDICINE

## 2024-10-30 PROCEDURE — 36415 COLL VENOUS BLD VENIPUNCTURE: CPT | Mod: ZL | Performed by: INTERNAL MEDICINE

## 2024-10-30 PROCEDURE — 81240 F2 GENE: CPT | Mod: ZL | Performed by: INTERNAL MEDICINE

## 2024-10-30 PROCEDURE — 84155 ASSAY OF PROTEIN SERUM: CPT | Mod: ZL | Performed by: INTERNAL MEDICINE

## 2024-10-30 PROCEDURE — 82784 ASSAY IGA/IGD/IGG/IGM EACH: CPT | Mod: ZL | Performed by: INTERNAL MEDICINE

## 2024-10-30 PROCEDURE — 86147 CARDIOLIPIN ANTIBODY EA IG: CPT | Mod: ZL | Performed by: INTERNAL MEDICINE

## 2024-10-30 PROCEDURE — 86334 IMMUNOFIX E-PHORESIS SERUM: CPT | Mod: ZL | Performed by: PATHOLOGY

## 2024-10-30 PROCEDURE — 85004 AUTOMATED DIFF WBC COUNT: CPT | Mod: ZL | Performed by: INTERNAL MEDICINE

## 2024-10-30 PROCEDURE — 99205 OFFICE O/P NEW HI 60 MIN: CPT | Performed by: INTERNAL MEDICINE

## 2024-10-30 PROCEDURE — G2211 COMPLEX E/M VISIT ADD ON: HCPCS | Performed by: INTERNAL MEDICINE

## 2024-10-30 PROCEDURE — 85652 RBC SED RATE AUTOMATED: CPT | Mod: ZL | Performed by: INTERNAL MEDICINE

## 2024-10-30 PROCEDURE — 83521 IG LIGHT CHAINS FREE EACH: CPT | Mod: ZL | Performed by: INTERNAL MEDICINE

## 2024-10-30 PROCEDURE — 85385 FIBRINOGEN ANTIGEN: CPT | Mod: ZL | Performed by: INTERNAL MEDICINE

## 2024-10-30 PROCEDURE — 86140 C-REACTIVE PROTEIN: CPT | Mod: ZL | Performed by: INTERNAL MEDICINE

## 2024-10-30 PROCEDURE — 99417 PROLNG OP E/M EACH 15 MIN: CPT | Performed by: INTERNAL MEDICINE

## 2024-10-30 PROCEDURE — 83615 LACTATE (LD) (LDH) ENZYME: CPT | Mod: ZL | Performed by: INTERNAL MEDICINE

## 2024-10-30 PROCEDURE — 82232 ASSAY OF BETA-2 PROTEIN: CPT | Mod: ZL | Performed by: INTERNAL MEDICINE

## 2024-10-30 PROCEDURE — 86146 BETA-2 GLYCOPROTEIN ANTIBODY: CPT | Mod: ZL | Performed by: INTERNAL MEDICINE

## 2024-10-30 PROCEDURE — 85379 FIBRIN DEGRADATION QUANT: CPT | Mod: ZL | Performed by: INTERNAL MEDICINE

## 2024-10-30 PROCEDURE — 85300 ANTITHROMBIN III ACTIVITY: CPT | Mod: ZL | Performed by: INTERNAL MEDICINE

## 2024-10-30 PROCEDURE — 85014 HEMATOCRIT: CPT | Mod: ZL | Performed by: INTERNAL MEDICINE

## 2024-10-30 PROCEDURE — G0452 MOLECULAR PATHOLOGY INTERPR: HCPCS | Mod: 26 | Performed by: PATHOLOGY

## 2024-10-30 PROCEDURE — 81291 MTHFR GENE: CPT | Mod: ZL | Performed by: INTERNAL MEDICINE

## 2024-10-30 PROCEDURE — G0463 HOSPITAL OUTPT CLINIC VISIT: HCPCS

## 2024-10-30 RX ORDER — VITAMIN B COMPLEX
1 TABLET ORAL 2 TIMES DAILY
COMMUNITY

## 2024-10-30 ASSESSMENT — PAIN SCALES - GENERAL: PAINLEVEL_OUTOF10: SEVERE PAIN (6)

## 2024-10-30 NOTE — PROGRESS NOTES
[facility]ology [facility] Oncology Consult Note    Patient: Sherri Bennett  MRN: [id number]  Date of Service: Oct 30, 2024       Reason for Visit    I was consulted by Scarlett Sheehan NP      ECOG Performance Status: 1    Encounter Diagnoses: Positive Lupus anticoagulant  Acute left CVA state    Problem List Items Addressed This Visit    None  Visit Diagnoses       Bone pain    -  Primary    Relevant Orders    NM Bone Scan Whole Body    Beta 2 microglobulin    CBC with Platelets & Differential    Comprehensive metabolic panel    Lactate Dehydrogenase    Kappa and lambda light chain    Immunoglobulins A G and M    Macroglobulins    Protein electrophoresis    Protein Immunofixation Serum    Lupus erythematosus, unspecified form        Relevant Orders    Antithrombin III    Beta 2 Glycoprotein 1 Antibody IgG    Beta 2 Glycoprotein 1 Antibody IgM    Cardiolipin Berenice IgG and IgM    D dimer quantitative    Erythrocyte sedimentation rate auto (Completed)    F2 prothrombin 23295E Mut Anal    Factor 5 leiden mutation analysis    Fibrinogen Antigen    Homocysteine    Thrombin time    MTHFR genotype    Erythrocyte sedimentation rate auto    CRP inflammation (Completed)    D dimer quantitative    NM Bone Scan Whole Body    CT Chest w Contrast    CT Abdomen Pelvis w Contrast    CBC with Platelets & Differential (Completed)    Comprehensive metabolic panel (Completed)    Lactate Dehydrogenase (Completed)    Beta 2 microglobulin    Immunoglobulins A G and M    Kappa and lambda light chain    Protein electrophoresis    Protein Immunofixation Serum    Beta 2 microglobulin    CBC with Platelets & Differential    Comprehensive metabolic panel    Lactate Dehydrogenase    Kappa and lambda light chain    Immunoglobulins A G and M    Macroglobulins    Protein electrophoresis    Protein Immunofixation Serum    Check Out Appointment Request    Epigastric pain        Relevant Orders    CT Abdomen Pelvis w Contrast    SOB (shortness of  breath)        Relevant Orders    D dimer quantitative    CT Chest w Contrast                ______________________________________________________________________________    Staging History  Cancer Staging   No matching staging information was found for the patient.        History of Present Illness    Ms. Sherri Bennett is a 78 year old white female with history of atrial fibrillation, hyperlipidemia history of acute left CVA who we are asked to evaluate concerning positive lupus anticoagulant.She had recently been admitted to the hospital on June 18 after she complained of dry cough fever malaise body aches anorexia nausea she was found to be in rapid atrial fibrillation and was admitted to ICU for rate control which was improved with diltiazem and metoprolol.  CT scan was consistent with left pyelonephritis she was treated with ceftriaxone she was found to have dysarthria and left facial droop she underwent a CTV of the head and neck which revealed FILLING defect in the superior sagittal sinus and right transverse sinus suspicious for partial dural sinus thrombosis.  Was also found to have a left frontal lobe cortical infarct as well as subacute left occipital lobe infarct she was started on IV heparin subsequent review of the filling defects were felt to be benign incidental arachnoid granulation.  She was also found to have a an MRI of the brain which measured 2.3 x 2.0 x 2.2 cm in the right posterior fossa with local mass effect of the right cerebellum.  The patient was continued on anticoagulation due to the fact she had atrial fibrillation and CVA thrombophilia workup was negative except for positive lupus anticoagulant and IgM cardiolipin antibodies being positive patient was switched to Eliquis 5 mg p.o. twice daily in terms of her pyelonephritis she initially had acute hypoxia respiratory failure and was treated with IV ceftriaxone completed 7 days and subsequently was discharged on June 25, 2024She was  sent seen by Buffy Acosta chart nurse practitioner October 15 with complaints of multiple joint pain secondary right knee and right ankle.  Given her history of positive lupus anticoagulant there was concern for possible lupus.  Labs were drawn including sed rate rheumatoid factor and RACH were all negative the patient was also seen by Dr. Radames Tian of orthopedics who felt the patient had chondral calcinosis right knee which could predispose her to significant osteoarthritis treated with 40 mg of Kenalog subcutaneously into the right knee.  Patient is coming comes in today for follow-up of potential hypercoagulable state.  She denies any history of previous DVT.  There is no family history of DVT.  She states there is a strong family history of CVA.  She has totally recovered from her CVA with no evidence of dysarthria or or upper or lower extremity weakness.  She does states she has occasional short-term memory loss.  She does note that her grandmother had esophageal cancer.  Is concerned about her joint pains and wants to know why her right knee was swollen in the past.  She is also complaining of left knee pain now and also swollen ankles right greater than left she also complains of back pain hip pain.  She states that she has history of allergic asthma and occasionally gets short of breath.  She did have an episode of diarrhea after discharge from the hospital is unclear if this has resolved..  Otherwise she denies significant cough or hemoptysis.  She denies bright red blood per rectum, hematemesis or melena.  She denies any hematuria or flank pain.  She denies easy bruisability gingival bleeding epistaxis she continues on Eliquis for chronic atrial fibrillation as well as baclofen diltiazem Singulair and trazodone.    Medications:    Current Outpatient Medications   Medication Sig Dispense Refill    apixaban ANTICOAGULANT (ELIQUIS) 5 MG tablet Take 1 tablet (5 mg) by mouth 2 times daily. 180 tablet 4     baclofen (LIORESAL) 10 MG tablet Take 1 tablet (10 mg) by mouth 2 times daily. 90 tablet 4    diltiazem ER (TIAZAC) 360 MG 24 hr ER beaded capsule Take 1 capsule (360 mg) by mouth daily 90 capsule 3    fluticasone (FLONASE) 50 MCG/ACT nasal spray Spray 1 spray into both nostrils daily      lisinopril (ZESTRIL) 10 MG tablet Take 1 tablet (10 mg) by mouth daily. -- Needs refill 8/29/2024 - almost out, accidentally threw away most of the bottle 90 tablet 4    metoprolol tartrate (LOPRESSOR) 50 MG tablet Take 1 tablet (50 mg) by mouth 2 times daily 180 tablet 3    montelukast (SINGULAIR) 10 MG tablet Take 1 tablet (10 mg) by mouth at bedtime 90 tablet 4    potassium chloride maame ER (KLOR-CON M10) 10 MEQ CR tablet Take 1 tablet (10 mEq) by mouth daily. 90 tablet 4    pravastatin (PRAVACHOL) 20 MG tablet Take 1 tablet (20 mg) by mouth daily. 90 tablet 4    traZODone (DESYREL) 50 MG tablet Take 1 tablet (50 mg) by mouth at bedtime 90 tablet 4    Vitamin D3 (VITAMIN D, CHOLECALCIFEROL,) 25 mcg (1000 units) tablet Take 1 tablet by mouth 2 times daily.      Baclofen (LIORESAL) 5 MG tablet       order for DME Neoprene glove for right hand for [employer] and [employer], custom.       No current facility-administered medications for this visit.           Past History    Past Medical History:   Diagnosis Date    Hormone replacement therapy (postmenopausal)     for surgical menopause     Past Surgical History:   Procedure Laterality Date    BIOPSY BREAST       2002, left breast    EXTRACAPSULAR CATARACT EXTRATION WITH INTRAOCULAR LENS IMPLANT      No Comments Provided    HYSTERECTOMY TOTAL ABDOMINAL, BILATERAL SALPINGO-OOPHORECTOMY, COMBINED      because of endometriosis    RELEASE CARPAL TUNNEL      No Comments Provided     Allergies   Allergen Reactions    Milk Protein GI Disturbance    Amoxicillin-Pot Clavulanate Diarrhea and GI Disturbance    Aspirin      Other reaction(s): Bronchospasm  Tolerates 81 mg aspirin    Hydrochlorothiazide  "     \"dizzy\"    Milk (Cow) GI Disturbance     Family History   Problem Relation Age of Onset    Other - See Comments Father         Stroke    Heart Disease Father         Heart Disease    Unknown/Adopted Mother         Unknown    Other - See Comments Other         No history of female cancers, thyroid cancer, diabetes, no significant early heart disease    Breast Cancer No family hx of         Cancer-breast     Social History     Socioeconomic History    Marital status:      Spouse name: None    Number of children: None    Years of education: None    Highest education level: None   Tobacco Use    Smoking status: Never     Passive exposure: Never    Smokeless tobacco: Never   Vaping Use    Vaping status: Never Used   Substance and Sexual Activity    Alcohol use: Yes     Comment: 2 oz Rum per day and 1 wine per week    Drug use: Never    Sexual activity: Yes     Partners: Male   Social History Narrative    .  Uses [employer] from [employer] [place] the [place] through mail order.  Has one son who works with them in that business.  He is somewhat disabled.     Ronaldo  Son    Leonel  Son     Ziyad      **pre upload 12/17/2012**     Social Drivers of Health     Financial Resource Strain: Low Risk  (12/30/2023)    Received from [facility]    Overall Financial Resource Strain (CARDIA)     Difficulty of Paying Living Expenses: Not hard at all   Food Insecurity: Low Risk  (9/24/2024)    Food Insecurity     Within the past 12 months, did you worry that your food would run out before you got money to buy more?: No     Within the past 12 months, did the food you bought just not last and you didn t have money to get more?: No   Transportation Needs: No Transportation Needs (12/30/2023)    Received from [facility]    PRAPARE - Transportation     Lack of Transportation (Medical): No     Lack of Transportation " (Non-Medical): No   Interpersonal Safety: Low Risk  (9/22/2024)    Interpersonal Safety     Do you feel physically and emotionally safe where you currently live?: Yes     Within the past 12 months, have you been hit, slapped, kicked or otherwise physically hurt by someone?: No     Within the past 12 months, have you been humiliated or emotionally abused in other ways by your partner or ex-partner?: No   Housing Stability: Low Risk  (12/30/2023)    Received from  and [facility]     Retired - What is your living situation today? : I have a steady place to live             Review of systems.  CNS: There are no headaches, no blurred vision, no change in mental status, she may have some short-term memory loss  ENT: There is no hearing loss.  EYES:  No visual complaints.  Respiratory: There is *7 shortness of breath, hemoptysis, cough  Cardiac: There is no chest pain, orthopnea, PND,   GI: There is no bright red blood per rectum, no hematemesis, no reflux, no diarrhea or constipation  Musculoskeletal: There are multiple joint pains including right and left knee bilateral ankles back and hips  : There is no urinary frequency, hematuria.  Constitutional: There is no fevers, night sweats, weight loss.  Endocrine: There are are no hot flashes, fatigue.   Neuro: There is no tingling or numbness in the hands or feet.  Hematologic: There is no gingival bleeding, epistaxis, or easy bruisability.  Dermatologic: There is no skin rash.  A 14 point review of systems is otherwise negative.            Physical Exam    /64   Pulse 55   Temp 98.5  F (36.9  C) (Tympanic)   Resp 16   Wt 55.8 kg (123 lb)   LMP 09/12/1998 (Approximate)   SpO2 97%   BMI 22.14 kg/m        GENERAL: Alert and oriented to time place and person. Seated comfortably. In no distress.    HEAD: Atraumatic and normocephalic.    EYES: MERARY, EOMI.  No pallor.  No icterus.    Oral cavity: no mucosal  lesion or tonsillar enlargement.    NECK: supple. JVP normal.  No thyroid enlargement.    LYMPH NODES: No palpable, cervical, axillary or inguinal lymphadenopathy.    CHEST: clear to auscultation bilaterally.  There are few expiratory wheezes on the right    CVS: Irregular, irregular rate rhythm  No murmur or gallop or rub heard.      ABDOMEN: Soft, there is palpable epigastric tenderness to palpation no rebound   No palpable hepatomegaly or splenomegaly.  No other mass palpable.  Normal active bowel sounds heard.    EXTREMITIES: No trace to 1+ ankle edema.    SKIN: no rash, or bruising or purpura.  Neuro: Grossly nonfocal    Lab Results    Recent Results (from the past 240 hours)   CBC with platelets and differential    Collection Time: 10/30/24 11:44 AM   Result Value Ref Range    WBC Count 6.3 4.0 - 11.0 10e3/uL    RBC Count 4.76 3.80 - 5.20 10e6/uL    Hemoglobin 14.7 11.7 - 15.7 g/dL    Hematocrit 44.7 35.0 - 47.0 %    MCV 94 78 - 100 fL    MCH 30.9 26.5 - 33.0 pg    MCHC 32.9 31.5 - 36.5 g/dL    RDW 13.2 10.0 - 15.0 %    Platelet Count 321 150 - 450 10e3/uL    % Neutrophils 62 %    % Lymphocytes 26 %    % Monocytes 8 %    % Eosinophils 2 %    % Basophils 1 %    % Immature Granulocytes 1 %    NRBCs per 100 WBC 0 <1 /100    Absolute Neutrophils 3.9 1.6 - 8.3 10e3/uL    Absolute Lymphocytes 1.7 0.8 - 5.3 10e3/uL    Absolute Monocytes 0.5 0.0 - 1.3 10e3/uL    Absolute Eosinophils 0.1 0.0 - 0.7 10e3/uL    Absolute Basophils 0.1 0.0 - 0.2 10e3/uL    Absolute Immature Granulocytes 0.1 <=0.4 10e3/uL    Absolute NRBCs 0.0 10e3/uL   Erythrocyte sedimentation rate auto    Collection Time: 10/30/24 11:58 AM   Result Value Ref Range    Erythrocyte Sedimentation Rate 5 0 - 30 mm/hr   CRP inflammation    Collection Time: 10/30/24 11:58 AM   Result Value Ref Range    CRP Inflammation <3.00 <5.00 mg/L   Lactate Dehydrogenase    Collection Time: 10/30/24 11:58 AM   Result Value Ref Range    Lactate Dehydrogenase 183 0 - 250 U/L    Comprehensive metabolic panel    Collection Time: 10/30/24 11:58 AM   Result Value Ref Range    Sodium 142 135 - 145 mmol/L    Potassium 4.0 3.4 - 5.3 mmol/L    Carbon Dioxide (CO2) 27 22 - 29 mmol/L    Anion Gap 10 7 - 15 mmol/L    Urea Nitrogen 13.6 8.0 - 23.0 mg/dL    Creatinine 0.64 0.51 - 0.95 mg/dL    GFR Estimate >90 >60 mL/min/1.73m2    Calcium 9.2 8.8 - 10.4 mg/dL    Chloride 105 98 - 107 mmol/L    Glucose 98 70 - 99 mg/dL    Alkaline Phosphatase 63 40 - 150 U/L    AST 18 0 - 45 U/L    ALT 17 0 - 50 U/L    Protein Total 7.4 6.4 - 8.3 g/dL    Albumin 4.4 3.5 - 5.2 g/dL    Bilirubin Total 0.3 <=1.2 mg/dL       Imaging Results    No results found.    Assessment/Plan: #1.  History of acute left CVA in the setting positive lupus anticoagulant: Rule out hypercoagulable state we will proceed with thrombophilia workup will obtain Antithrombin III level, cardiolipin antibodies, beta-2 glycoprotein antibodies, homocystine level, MTHFR gene mutation, fibrinogen level, thrombin time, factor V Leiden, and prothrombin 2 mutation.  2.  :.  Diffuse arthralgias with multiple bony complaints: CRP and sed rate are normal RACH is normal rheumatoid factor is normal.  Nonetheless we will proceed with workup will obtain myeloma labs given her multiple complaints of shortness of breath abdominal pain and bone pain I will like to proceed with metastatic workup with CT chest, CT abdomen/pelvis and bone scan.  She will follow-up with rheumatology as per PCP.  Time spent: 118 minutes was spent on this new patient consultation: We spent enormous amount of time reviewing the literature on lupus anticoagulant, we reviewed multiple physician provider notes, including hospitalist notes, PCP notes, discharge summaries, we reviewed lab results with the patient, we performed a history/physical, documented history/physical, and ordered multiple labs including myeloma labs, as well as imaging including CT chest and CT abdomen/pelvis as well as  bone scan.  The longitudinal plan of care for the diagnosis(es)/condition(s) as documented were addressed during this visit. Due to the added complexity in care, I will continue to support Sherri in the subsequent management and with ongoing continuity of care.        Signed by: Regulo Shipley MD

## 2024-10-30 NOTE — NURSING NOTE
"Oncology Rooming Note    October 30, 2024 10:32 AM   Sherri Bennett is a 78 year old female who presents for:    Chief Complaint   Patient presents with    Hematology     CONSULT:  Lupus Erythemotosis     Initial Vitals: BP (!) 154/86   Pulse 55   Temp 98.5  F (36.9  C) (Tympanic)   Resp 16   Wt 55.8 kg (123 lb)   LMP 09/12/1998 (Approximate)   SpO2 97%   BMI 22.14 kg/m   Estimated body mass index is 22.14 kg/m  as calculated from the following:    Height as of 9/24/24: 1.588 m (5' 2.5\").    Weight as of this encounter: 55.8 kg (123 lb). Body surface area is 1.57 meters squared.  Severe Pain (6) Comment: Data Unavailable   Patient's last menstrual period was 09/12/1998 (approximate).  Allergies reviewed: Yes  Medications reviewed: Yes    Medications: Medication refills not needed today.  Pharmacy name entered into EPIC:    [id number] ([facility]) - [place], [place] - [address]  [facility] - [place], [place] - [address]    Frailty Screening:   Is the patient here for a new oncology consult visit in cancer care? 1. Yes. Over the past month, have you experienced difficulty or required a caregiver to assist with:   1. Balance, walking or general mobility (including any falls)? NO  2. Completion of self-care tasks such as bathing, dressing, toileting, grooming/hygiene?  NO  3. Concentration or memory that affects your daily life?  NO       Clinical concerns: None       Emily Arciniega CMA ([facility])  "

## 2024-10-30 NOTE — PATIENT INSTRUCTIONS
Please bring in a copy of your Living Will.  Labs done today  CT scans in 1-2 weeks  Follow up with Dr. Shipley in 3-4 weeks

## 2024-11-01 LAB
AT III ACT/NOR PPP CHRO: 109 % (ref 85–135)
B2 GLYCOPROT1 IGG SERPL IA-ACNC: 1 U/ML
B2 GLYCOPROT1 IGM SERPL IA-ACNC: <2.4 U/ML
B2 MICROGLOB TUMOR MARKER SER-MCNC: 1.6 MG/L
CARDIOLIPIN IGG SER IA-ACNC: <2 GPL-U/ML
CARDIOLIPIN IGG SER IA-ACNC: NEGATIVE
CARDIOLIPIN IGM SER IA-ACNC: 2.6 MPL-U/ML
CARDIOLIPIN IGM SER IA-ACNC: NEGATIVE
HCYS SERPL-SCNC: 12.3 UMOL/L (ref 0–15)
IGA SERPL-MCNC: 224 MG/DL (ref 84–499)
IGG SERPL-MCNC: 935 MG/DL (ref 610–1616)
IGM SERPL-MCNC: 48 MG/DL (ref 35–242)
KAPPA LC FREE SER-MCNC: 0.94 MG/DL (ref 0.33–1.94)
KAPPA LC FREE/LAMBDA FREE SER NEPH: 1.49 {RATIO} (ref 0.26–1.65)
LAMBDA LC FREE SERPL-MCNC: 0.63 MG/DL (ref 0.57–2.63)
LOCATION OF TASK: NORMAL
PROT PATTERN SERPL IFE-IMP: NORMAL
THROMBIN TIME: 17.5 SECONDS (ref 13–19)

## 2024-11-05 LAB — FIBRINOGEN AG PPP IA-MCNC: 364 MG/DL

## 2024-11-07 ENCOUNTER — HOSPITAL ENCOUNTER (OUTPATIENT)
Dept: NUCLEAR MEDICINE | Facility: OTHER | Age: 79
Setting detail: NUCLEAR MEDICINE
Discharge: HOME | End: 2024-11-07
Attending: INTERNAL MEDICINE
Payer: MEDICARE

## 2024-11-07 ENCOUNTER — HOSPITAL ENCOUNTER (OUTPATIENT)
Dept: CT IMAGING | Facility: OTHER | Age: 79
Discharge: HOME | End: 2024-11-07
Attending: INTERNAL MEDICINE
Payer: MEDICARE

## 2024-11-07 DIAGNOSIS — L93.0 LUPUS ERYTHEMATOSUS, UNSPECIFIED FORM: ICD-10-CM

## 2024-11-07 DIAGNOSIS — R06.02 SOB (SHORTNESS OF BREATH): ICD-10-CM

## 2024-11-07 DIAGNOSIS — R10.13 EPIGASTRIC PAIN: ICD-10-CM

## 2024-11-07 DIAGNOSIS — M89.8X9 BONE PAIN: ICD-10-CM

## 2024-11-07 PROCEDURE — 343N000001 HC RX 343 MED OP 636: Performed by: INTERNAL MEDICINE

## 2024-11-07 PROCEDURE — A9503 TC99M MEDRONATE: HCPCS | Performed by: INTERNAL MEDICINE

## 2024-11-07 PROCEDURE — 74177 CT ABD & PELVIS W/CONTRAST: CPT | Mod: MG

## 2024-11-07 PROCEDURE — G1010 CDSM STANSON: HCPCS

## 2024-11-07 PROCEDURE — 78306 BONE IMAGING WHOLE BODY: CPT | Mod: MG

## 2024-11-07 PROCEDURE — 250N000011 HC RX IP 250 OP 636: Performed by: INTERNAL MEDICINE

## 2024-11-07 RX ORDER — TC 99M MEDRONATE 20 MG/10ML
25.7 INJECTION, POWDER, LYOPHILIZED, FOR SOLUTION INTRAVENOUS ONCE
Status: COMPLETED | OUTPATIENT
Start: 2024-11-07 | End: 2024-11-07

## 2024-11-07 RX ORDER — IOPAMIDOL 755 MG/ML
71 INJECTION, SOLUTION INTRAVASCULAR ONCE
OUTPATIENT
Start: 2024-11-07 | End: 2024-11-07

## 2024-11-07 RX ORDER — IOPAMIDOL 755 MG/ML
71 INJECTION, SOLUTION INTRAVASCULAR ONCE
Status: COMPLETED | OUTPATIENT
Start: 2024-11-07 | End: 2024-11-07

## 2024-11-07 RX ADMIN — IOPAMIDOL 71 ML: 755 INJECTION, SOLUTION INTRAVENOUS at 10:42

## 2024-11-07 RX ADMIN — TC 99M MEDRONATE 25.7 MILLICURIE: 20 INJECTION, POWDER, LYOPHILIZED, FOR SOLUTION INTRAVENOUS at 10:00

## 2024-11-27 ENCOUNTER — LAB (OUTPATIENT)
Dept: LAB | Facility: OTHER | Age: 79
End: 2024-11-27
Attending: INTERNAL MEDICINE
Payer: MEDICARE

## 2024-11-27 DIAGNOSIS — L93.0 LUPUS ERYTHEMATOSUS, UNSPECIFIED FORM: ICD-10-CM

## 2024-11-27 DIAGNOSIS — M89.8X9 BONE PAIN: ICD-10-CM

## 2024-11-27 DIAGNOSIS — I63.9 ACUTE CVA (CEREBROVASCULAR ACCIDENT) (H): ICD-10-CM

## 2024-11-27 LAB
ALBUMIN SERPL BCG-MCNC: 4.6 G/DL (ref 3.5–5.2)
ALP SERPL-CCNC: 69 U/L (ref 40–150)
ALT SERPL W P-5'-P-CCNC: 13 U/L (ref 0–50)
ANION GAP SERPL CALCULATED.3IONS-SCNC: 9 MMOL/L (ref 7–15)
AST SERPL W P-5'-P-CCNC: 21 U/L (ref 0–45)
BASOPHILS # BLD AUTO: 0 10E3/UL (ref 0–0.2)
BASOPHILS NFR BLD AUTO: 1 %
BILIRUB SERPL-MCNC: 0.7 MG/DL
BUN SERPL-MCNC: 11.1 MG/DL (ref 8–23)
CALCIUM SERPL-MCNC: 9.5 MG/DL (ref 8.8–10.4)
CHLORIDE SERPL-SCNC: 104 MMOL/L (ref 98–107)
CREAT SERPL-MCNC: 0.6 MG/DL (ref 0.51–0.95)
EGFRCR SERPLBLD CKD-EPI 2021: >90 ML/MIN/1.73M2
EOSINOPHIL # BLD AUTO: 0 10E3/UL (ref 0–0.7)
EOSINOPHIL NFR BLD AUTO: 1 %
ERYTHROCYTE [DISTWIDTH] IN BLOOD BY AUTOMATED COUNT: 13.3 % (ref 10–15)
GLUCOSE SERPL-MCNC: 110 MG/DL (ref 70–99)
HCO3 SERPL-SCNC: 28 MMOL/L (ref 22–29)
HCT VFR BLD AUTO: 44.8 % (ref 35–47)
HGB BLD-MCNC: 15 G/DL (ref 11.7–15.7)
IMM GRANULOCYTES # BLD: 0 10E3/UL
IMM GRANULOCYTES NFR BLD: 0 %
LDH SERPL L TO P-CCNC: 178 U/L (ref 0–250)
LYMPHOCYTES # BLD AUTO: 1.4 10E3/UL (ref 0.8–5.3)
LYMPHOCYTES NFR BLD AUTO: 28 %
MCH RBC QN AUTO: 31 PG (ref 26.5–33)
MCHC RBC AUTO-ENTMCNC: 33.5 G/DL (ref 31.5–36.5)
MCV RBC AUTO: 93 FL (ref 78–100)
MONOCYTES # BLD AUTO: 0.4 10E3/UL (ref 0–1.3)
MONOCYTES NFR BLD AUTO: 8 %
NEUTROPHILS # BLD AUTO: 3.1 10E3/UL (ref 1.6–8.3)
NEUTROPHILS NFR BLD AUTO: 62 %
NRBC # BLD AUTO: 0 10E3/UL
NRBC BLD AUTO-RTO: 0 /100
PLATELET # BLD AUTO: 264 10E3/UL (ref 150–450)
POTASSIUM SERPL-SCNC: 3.8 MMOL/L (ref 3.4–5.3)
PROT SERPL-MCNC: 7.4 G/DL (ref 6.4–8.3)
RBC # BLD AUTO: 4.84 10E6/UL (ref 3.8–5.2)
SODIUM SERPL-SCNC: 141 MMOL/L (ref 135–145)
TOTAL PROTEIN SERUM FOR ELP: 6.9 G/DL (ref 6.4–8.3)
WBC # BLD AUTO: 4.9 10E3/UL (ref 4–11)

## 2024-11-27 PROCEDURE — 82784 ASSAY IGA/IGD/IGG/IGM EACH: CPT | Mod: ZL

## 2024-11-27 PROCEDURE — 85025 COMPLETE CBC W/AUTO DIFF WBC: CPT | Mod: ZL

## 2024-11-27 PROCEDURE — 36415 COLL VENOUS BLD VENIPUNCTURE: CPT | Mod: ZL

## 2024-11-27 PROCEDURE — 84165 PROTEIN E-PHORESIS SERUM: CPT | Mod: 26 | Performed by: PATHOLOGY

## 2024-11-27 PROCEDURE — 86334 IMMUNOFIX E-PHORESIS SERUM: CPT | Mod: 26 | Performed by: PATHOLOGY

## 2024-11-27 PROCEDURE — 86334 IMMUNOFIX E-PHORESIS SERUM: CPT | Mod: ZL | Performed by: PATHOLOGY

## 2024-11-27 PROCEDURE — 84165 PROTEIN E-PHORESIS SERUM: CPT | Mod: TC,ZL | Performed by: PATHOLOGY

## 2024-11-27 PROCEDURE — 84155 ASSAY OF PROTEIN SERUM: CPT | Mod: ZL

## 2024-11-27 PROCEDURE — 82232 ASSAY OF BETA-2 PROTEIN: CPT | Mod: ZL

## 2024-11-27 PROCEDURE — 82310 ASSAY OF CALCIUM: CPT | Mod: ZL

## 2024-11-27 PROCEDURE — 82040 ASSAY OF SERUM ALBUMIN: CPT | Mod: ZL

## 2024-11-27 PROCEDURE — 83615 LACTATE (LD) (LDH) ENZYME: CPT | Mod: ZL

## 2024-11-27 PROCEDURE — 83521 IG LIGHT CHAINS FREE EACH: CPT | Mod: ZL

## 2024-11-29 LAB
ALBUMIN SERPL ELPH-MCNC: 4.5 G/DL (ref 3.7–5.1)
ALPHA1 GLOB SERPL ELPH-MCNC: 0.3 G/DL (ref 0.2–0.4)
ALPHA2 GLOB SERPL ELPH-MCNC: 0.6 G/DL (ref 0.5–0.9)
B-GLOBULIN SERPL ELPH-MCNC: 0.7 G/DL (ref 0.6–1)
B2 MICROGLOB TUMOR MARKER SER-MCNC: 1.9 MG/L
GAMMA GLOB SERPL ELPH-MCNC: 0.8 G/DL (ref 0.7–1.6)
IGA SERPL-MCNC: 214 MG/DL (ref 84–499)
IGG SERPL-MCNC: 854 MG/DL (ref 610–1616)
IGM SERPL-MCNC: 41 MG/DL (ref 35–242)
KAPPA LC FREE SER-MCNC: 1.2 MG/DL (ref 0.33–1.94)
KAPPA LC FREE/LAMBDA FREE SER NEPH: 1.33 {RATIO} (ref 0.26–1.65)
LAMBDA LC FREE SERPL-MCNC: 0.9 MG/DL (ref 0.57–2.63)
LOCATION OF TASK: NORMAL
LOCATION OF TASK: NORMAL
M PROTEIN SERPL ELPH-MCNC: 0 G/DL
PROT PATTERN SERPL ELPH-IMP: NORMAL
PROT PATTERN SERPL IFE-IMP: NORMAL

## 2024-12-01 ENCOUNTER — MYC MEDICAL ADVICE (OUTPATIENT)
Dept: NEUROLOGY | Facility: OTHER | Age: 79
End: 2024-12-01
Payer: COMMERCIAL

## 2024-12-03 NOTE — TELEPHONE ENCOUNTER
Patient is aware that appointment on Friday with Dr. Culp is a video visit. Patient will call us if she has any other questions or trouble with appointment.    Janel Díaz on 12/3/2024 at 10:34 AM

## 2024-12-04 ENCOUNTER — ONCOLOGY VISIT (OUTPATIENT)
Dept: ONCOLOGY | Facility: OTHER | Age: 79
End: 2024-12-04
Attending: INTERNAL MEDICINE
Payer: MEDICARE

## 2024-12-04 VITALS
RESPIRATION RATE: 20 BRPM | OXYGEN SATURATION: 98 % | BODY MASS INDEX: 22.14 KG/M2 | DIASTOLIC BLOOD PRESSURE: 82 MMHG | WEIGHT: 123 LBS | HEART RATE: 62 BPM | SYSTOLIC BLOOD PRESSURE: 162 MMHG | TEMPERATURE: 98.4 F

## 2024-12-04 DIAGNOSIS — L93.0 LUPUS ERYTHEMATOSUS, UNSPECIFIED FORM: Primary | ICD-10-CM

## 2024-12-04 PROCEDURE — G0463 HOSPITAL OUTPT CLINIC VISIT: HCPCS

## 2024-12-04 ASSESSMENT — ENCOUNTER SYMPTOMS
BACK PAIN: 1
JOINT SWELLING: 1
RESPIRATORY NEGATIVE: 1
ENDOCRINE NEGATIVE: 1
FATIGUE: 1
NEUROLOGICAL NEGATIVE: 1
ALLERGIC/IMMUNOLOGIC NEGATIVE: 1
EYES NEGATIVE: 1
CARDIOVASCULAR NEGATIVE: 1
GASTROINTESTINAL NEGATIVE: 1
PSYCHIATRIC NEGATIVE: 1
ARTHRALGIAS: 1
HEMATOLOGIC/LYMPHATIC NEGATIVE: 1

## 2024-12-04 ASSESSMENT — PAIN SCALES - GENERAL: PAINLEVEL_OUTOF10: SEVERE PAIN (7)

## 2024-12-04 NOTE — PROGRESS NOTES
[facility]ology [facility] Oncology Progress Note    Patient: Sherri Bennett  MRN: [id number]  Date of Service: Dec 4, 2024         Reason for Visit    Chief Complaint   Patient presents with    Oncology Clinic Visit     F/U Lupus       ECOG Performance Status:       Encounter Diagnoses: Positive lupus anticoagulant  Acute left CVA      History of Present Illness:    Ms. Sherri Bennett returns for follow-up of positive lupus anticoagulant in the setting of a history of acute left CVA.  We had seen the patient in consultation October 30.  At that time she was a 78-year-old white female history of atrial fibrillation, hyperlipidemia history of acute left CVA who asked evaluate concerning positive lupus anticoagulant..She had recently been admitted to the hospital on June 18 after she complained of dry cough fever malaise body aches anorexia nausea she was found to be in rapid atrial fibrillation and was admitted to ICU for rate control which was improved with diltiazem and metoprolol.  CT scan was consistent with left pyelonephritis she was treated with ceftriaxone she was found to have dysarthria and left facial droop she underwent a CTV of the head and neck which revealed FILLING defect in the superior sagittal sinus and right transverse sinus suspicious for partial dural sinus thrombosis.  Was also found to have a left frontal lobe cortical infarct as well as subacute left occipital lobe infarct she was started on IV heparin subsequent review of the filling defects were felt to be benign incidental arachnoid granulation.  She was also found to have a an MRI of the brain which measured 2.3 x 2.0 x 2.2 cm in the right posterior fossa with local mass effect of the right cerebellum.  The patient was continued on anticoagulation due to the fact she had atrial fibrillation and CVA thrombophilia workup was negative except for positive lupus anticoagulant and IgM cardiolipin antibodies being positive patient was  switched to Eliquis 5 mg p.o. twice daily in terms of her pyelonephritis she initially had acute hypoxia respiratory failure and was treated with IV ceftriaxone completed 7 days and subsequently was discharged on June 25, 2024She was sent seen by Buffy Acosta chart nurse practitioner October 15 with complaints of multiple joint pain secondary right knee and right ankle.  Given her history of positive lupus anticoagulant there was concern for possible lupus.  Labs were drawn including sed rate rheumatoid factor and RACH were all negative the patient was also seen by Dr. Radames Tian of orthopedics who felt the patient had chondral calcinosis right knee which could predispose her to significant osteoarthritis treated with 40 mg of Kenalog subcutaneously into the right knee. Patient is coming comes in today for follow-up of potential hypercoagulable state. She denies any history of previous DVT. There is no family history of DVT. She states there is a strong family history of CVA. She has totally recovered from her CVA with no evidence of dysarthria or or upper or lower extremity weakness. She does states she has occasional short-term memory loss. She does note that her grandmother had esophageal cancer. Is concerned about her joint pains and wants to know why her right knee was swollen in the past. She is also complaining of left knee pain now and also swollen ankles right greater than left she also complains of back pain hip pain. She states that she has history of allergic asthma and occasionally gets short of breath.  She was on Eliquis for atrial fibrillation.  When we saw the patient we wanted to rule out hypercoagulable state given her positive lupus anticoagulant.  Factor V Leiden and prothrombin 2 were negative, Antithrombin III activity was negative, homocystine levels were normal cardiolipin antibody IgM was positive cardiolipin antibody IgG was negative.  Beta-2 glycoprotein antibody IgM and IgG were  negative.  Therefore the patient did not have antiphospholipid syndrome.  MTHFR DNA was negative.  D-dimer was normal.  Myeloma labs are all normal will obtain imaging given her multiple complaints to rule out occult malignancy CT chest and CT abdomen/pelvis were unremarkable there was a 2.4 x 1.7 cm heterogeneously low-density lesion seen in the lower pole of the left kidney which was smaller when compared to prior studies.  There was degenerative changes within the lumbar spine and bony pelvis..  The lungs were clear with scattered airspace opacities and bilateral pleural effusions having been resolved.  There was unchanged cardiomegaly.  Nuclear medicine bone scan revealed endplate and facet uptake within thoracic and lumbar spine consistent with degenerative joint disease there is also degenerative joint disease involving the wrists knees and feet.  Sed rate was normal.  It was recommended she see a rheumatologist but she has not made the appointment yet otherwise she comes in today mainly with baseline stable arthritic complaints involving the knees wrist and back.  She is planning to see her primary care provider Scarlett Manuel for follow-up      ______________________________________________________________________________    Past History    Past Medical History:   Diagnosis Date    Hormone replacement therapy (postmenopausal)     for surgical menopause       Past Surgical History:   Procedure Laterality Date    BIOPSY BREAST       2002, left breast    EXTRACAPSULAR CATARACT EXTRATION WITH INTRAOCULAR LENS IMPLANT      No Comments Provided    HYSTERECTOMY TOTAL ABDOMINAL, BILATERAL SALPINGO-OOPHORECTOMY, COMBINED      because of endometriosis    RELEASE CARPAL TUNNEL      No Comments Provided       Review of Systems   Constitutional:  Positive for fatigue.   HENT: Negative.     Eyes: Negative.    Respiratory: Negative.     Cardiovascular: Negative.    Gastrointestinal: Negative.    Endocrine: Negative.     Genitourinary: Negative.    Musculoskeletal:  Positive for arthralgias, back pain and joint swelling.   Skin: Negative.    Allergic/Immunologic: Negative.    Neurological: Negative.    Hematological: Negative.    Psychiatric/Behavioral: Negative.     All other systems reviewed and are negative.         Physical Exam    BP (!) 162/82   Pulse 62   Temp 98.4  F (36.9  C) (Tympanic)   Resp 20   Wt 55.8 kg (123 lb)   LMP 09/12/1998 (Approximate)   SpO2 98%   BMI 22.14 kg/m      Physical Exam  Vitals and nursing note reviewed.   Constitutional:       Appearance: Normal appearance. She is normal weight.   HENT:      Head: Normocephalic and atraumatic.      Nose: Nose normal.      Mouth/Throat:      Mouth: Mucous membranes are moist.      Pharynx: Oropharynx is clear.   Eyes:      Extraocular Movements: Extraocular movements intact.      Conjunctiva/sclera: Conjunctivae normal.      Pupils: Pupils are equal, round, and reactive to light.   Cardiovascular:      Rate and Rhythm: Normal rate. Rhythm irregular.      Pulses: Normal pulses.      Heart sounds: Normal heart sounds.   Pulmonary:      Effort: Pulmonary effort is normal.      Breath sounds: Normal breath sounds.   Abdominal:      General: Abdomen is flat. Bowel sounds are normal.      Palpations: Abdomen is soft.      Tenderness: There is no abdominal tenderness.   Musculoskeletal:         General: Tenderness (Positive tenderness involving both knees upon palpation of patella there is tenderness over the lumbar spine upon palpation) present. Normal range of motion.      Cervical back: Normal range of motion and neck supple.      Comments: No ankle edema.   Skin:     General: Skin is warm and dry.   Neurological:      General: No focal deficit present.      Mental Status: She is alert and oriented to person, place, and time.   Psychiatric:         Mood and Affect: Mood normal.         Behavior: Behavior normal.          Lab Results    Recent Results (from the  past 240 hours)   Beta 2 microglobulin    Collection Time: 11/27/24 10:16 AM   Result Value Ref Range    Beta-2-Microglobulin 1.9 <2.3 mg/L   Immunoglobulins A G and M    Collection Time: 11/27/24 10:16 AM   Result Value Ref Range    Immunoglobulin G 854 610 - 1,616 mg/dL    Immunoglobulin A 214 84 - 499 mg/dL    Immunoglobulin M 41 35 - 242 mg/dL   Kappa and lambda light chain    Collection Time: 11/27/24 10:16 AM   Result Value Ref Range    Kappa Free Light Chains 1.20 0.33 - 1.94 mg/dL    Lambda Free Light Chains 0.90 0.57 - 2.63 mg/dL    Kappa /Lambda Ratio 1.33 0.26 - 1.65   Protein Immunofixation Serum    Collection Time: 11/27/24 10:16 AM   Result Value Ref Range    Immunofixation ELP       No monoclonal protein seen on immunofixation. Pathologic significance requires clinical correlation.  ANUJ Estrada M.D., Ph.D., Pathologist ()    Signout Location if Remote [facility]    Comprehensive metabolic panel    Collection Time: 11/27/24 10:16 AM   Result Value Ref Range    Sodium 141 135 - 145 mmol/L    Potassium 3.8 3.4 - 5.3 mmol/L    Carbon Dioxide (CO2) 28 22 - 29 mmol/L    Anion Gap 9 7 - 15 mmol/L    Urea Nitrogen 11.1 8.0 - 23.0 mg/dL    Creatinine 0.60 0.51 - 0.95 mg/dL    GFR Estimate >90 >60 mL/min/1.73m2    Calcium 9.5 8.8 - 10.4 mg/dL    Chloride 104 98 - 107 mmol/L    Glucose 110 (H) 70 - 99 mg/dL    Alkaline Phosphatase 69 40 - 150 U/L    AST 21 0 - 45 U/L    ALT 13 0 - 50 U/L    Protein Total 7.4 6.4 - 8.3 g/dL    Albumin 4.6 3.5 - 5.2 g/dL    Bilirubin Total 0.7 <=1.2 mg/dL   Lactate Dehydrogenase    Collection Time: 11/27/24 10:16 AM   Result Value Ref Range    Lactate Dehydrogenase 178 0 - 250 U/L   Total Protein, Serum for ELP    Collection Time: 11/27/24 10:16 AM   Result Value Ref Range    Total Protein Serum for ELP 6.9 6.4 - 8.3 g/dL   Protein Electrophoresis, Serum    Collection Time: 11/27/24 10:16 AM   Result Value Ref Range    Albumin 4.5 3.7 - 5.1 g/dL    Alpha 1 0.3 0.2 - 0.4  g/dL    Alpha 2 0.6 0.5 - 0.9 g/dL    Beta Globulin 0.7 0.6 - 1.0 g/dL    Gamma Globulin 0.8 0.7 - 1.6 g/dL    Monoclonal Peak 0.0 <=0.0 g/dL    ELP Interpretation       Essentially normal electrophoretic pattern. No obvious monoclonal proteins seen. Pathologic significance requires clinical correlation. ANUJ Estrada M.D., Ph.D., Pathologist ().    Signout Location if Remote [facility]    CBC with platelets and differential    Collection Time: 11/27/24 10:16 AM   Result Value Ref Range    WBC Count 4.9 4.0 - 11.0 10e3/uL    RBC Count 4.84 3.80 - 5.20 10e6/uL    Hemoglobin 15.0 11.7 - 15.7 g/dL    Hematocrit 44.8 35.0 - 47.0 %    MCV 93 78 - 100 fL    MCH 31.0 26.5 - 33.0 pg    MCHC 33.5 31.5 - 36.5 g/dL    RDW 13.3 10.0 - 15.0 %    Platelet Count 264 150 - 450 10e3/uL    % Neutrophils 62 %    % Lymphocytes 28 %    % Monocytes 8 %    % Eosinophils 1 %    % Basophils 1 %    % Immature Granulocytes 0 %    NRBCs per 100 WBC 0 <1 /100    Absolute Neutrophils 3.1 1.6 - 8.3 10e3/uL    Absolute Lymphocytes 1.4 0.8 - 5.3 10e3/uL    Absolute Monocytes 0.4 0.0 - 1.3 10e3/uL    Absolute Eosinophils 0.0 0.0 - 0.7 10e3/uL    Absolute Basophils 0.0 0.0 - 0.2 10e3/uL    Absolute Immature Granulocytes 0.0 <=0.4 10e3/uL    Absolute NRBCs 0.0 10e3/uL       Imaging    CT Abdomen Pelvis w Contrast    Result Date: 11/7/2024  CT CHEST W CONTRAST, CT ABDOMEN PELVIS W CONTRAST CLINICAL HISTORY: Female, age 78 years, SOB; Lupus erythematosus, unspecified form; SOB (shortness of breath); Comparison:  CT angiogram of the chest, abdomen and pelvis 6/20/2024 TECHNIQUE:  CT scanwas performed of the chest, abdomen and pelvis with IV contrast. Axial; sagittal and coronal images were reviewed. 3-D MIP images were reviewed to optimize lung nodule conspicuity. This facility minimizes radiation dose by adjusting the mA and/or kV according to each patient size. This CT scan was performed using one or more the following dose reduction techniques:  -Automated exposure control, -Adjustment of the mA and/or kV according to patient's size, and/or, -Use of iterative reconstruction technique. FINDINGS: Chest: There has been significant interval improvement. Scattered airspace opacities seen previously throughout both lungs have resolved. Bilateral pleural effusions are also no longer appreciated. Heart and great vessels demonstrate no acute abnormality. There is persistent distention of the left atrium. Thyroid gland is unremarkable. The esophagus is unremarkable. Bony structures: No acute abnormality. 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 Degenerative changes of the thoracic spine are also similar appearance. There is no evidence of an acute or subacute rib fracture. Sternum is intact. Abdomen/Pelvis: Stomach and duodenum: Unremarkable. Liver: Unremarkable. Gallbladder: Partially distended, unremarkable. Spleen: Unremarkable Pancreas: Unremarkable. Adrenal glands: Unremarkable. Kidneys: A 2.4 x 1.7 cm heterogeneously low dense lesion is again seen in the lower pole the left kidney, smaller when compared to prior study. There are number of smaller low dense foci again seen throughout the left and right kidney. Ureters: The visualized portions of the ureters are unremarkable. Urinary bladder: Unremarkable. Large and small bowel: No acute abnormality. The appendix is not seen. There are no secondary signs of appendicitis. The uterus is surgically absent. There are number of surgical clips suggesting previous lymph node dissection. The abdominal aorta and inferior vena cava are normal in contour. Retroperitoneal metastatic lymph nodes are normal in size. Bony structures: No acute abnormality. Degenerative changes within the lumbar spine and bony pelvis are similar in appearance.     IMPRESSION:  No evidence of acute abnormality. The lungs are clear. Scattered airspace opacities and  bilateral pleural effusions have resolved. Unchanged cardiomegaly. 2.4 cm left renal lesion has decreased in size, previously measuring 2.2 cm. Additional nonacute findings as described above are similar in appearance compared to prior study. SABIHA MCCLAIN MD   SYSTEM ID:  [id number]    CT Chest w Contrast    Result Date: 11/7/2024  CT CHEST W CONTRAST, CT ABDOMEN PELVIS W CONTRAST CLINICAL HISTORY: Female, age 78 years, SOB; Lupus erythematosus, unspecified form; SOB (shortness of breath); Comparison:  CT angiogram of the chest, abdomen and pelvis 6/20/2024 TECHNIQUE:  CT scanwas performed of the chest, abdomen and pelvis with IV contrast. Axial; sagittal and coronal images were reviewed. 3-D MIP images were reviewed to optimize lung nodule conspicuity. This facility minimizes radiation dose by adjusting the mA and/or kV according to each patient size. This CT scan was performed using one or more the following dose reduction techniques: -Automated exposure control, -Adjustment of the mA and/or kV according to patient's size, and/or, -Use of iterative reconstruction technique. FINDINGS: Chest: There has been significant interval improvement. Scattered airspace opacities seen previously throughout both lungs have resolved. Bilateral pleural effusions are also no longer appreciated. Heart and great vessels demonstrate no acute abnormality. There is persistent distention of the left atrium. Thyroid gland is unremarkable. The esophagus is unremarkable. Bony structures: No acute abnormality. 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 Degenerative changes of the thoracic spine are also similar appearance. There is no evidence of an acute or subacute rib fracture. Sternum is intact. Abdomen/Pelvis: Stomach and duodenum: Unremarkable. Liver: Unremarkable. Gallbladder: Partially distended, unremarkable. Spleen: Unremarkable Pancreas: Unremarkable.  Adrenal glands: Unremarkable. Kidneys: A 2.4 x 1.7 cm heterogeneously low dense lesion is again seen in the lower pole the left kidney, smaller when compared to prior study. There are number of smaller low dense foci again seen throughout the left and right kidney. Ureters: The visualized portions of the ureters are unremarkable. Urinary bladder: Unremarkable. Large and small bowel: No acute abnormality. The appendix is not seen. There are no secondary signs of appendicitis. The uterus is surgically absent. There are number of surgical clips suggesting previous lymph node dissection. The abdominal aorta and inferior vena cava are normal in contour. Retroperitoneal metastatic lymph nodes are normal in size. Bony structures: No acute abnormality. Degenerative changes within the lumbar spine and bony pelvis are similar in appearance.     IMPRESSION:  No evidence of acute abnormality. The lungs are clear. Scattered airspace opacities and bilateral pleural effusions have resolved. Unchanged cardiomegaly. 2.4 cm left renal lesion has decreased in size, previously measuring 2.2 cm. Additional nonacute findings as described above are similar in appearance compared to prior study. SABIHA MCCLAIN MD   SYSTEM ID:  [id number]    NM Bone Scan Whole Body    Result Date: 11/7/2024  EXAMINATION: NM BONE SCAN WHOLE BODY Whole-body bone scan, 11/7/2024 1:37 PM HISTORY: bone pain; Lupus erythematosus, unspecified form; Bone pain COMPARISON: CT 11/7/2024 TECHNIQUE: The patient received 25.7 mCi of Tc-99m MDP intravenously. Whole body bone images were obtained at 3 hours. FINDINGS: No sites of highly suspicious radiotracer uptake are seen. Endplate and facet uptake within the thoracic and lumbar spine is degenerative. Degenerative activity is also seen at the wrists, knees and feet. JACOB COTTER MD   SYSTEM ID:  [id number]     Assessment and Plan:: #1 : positive lupus anticoagulant in the setting of a history of acute left CVA:  Hypercoagulable state was ruled out there was no evidence of antiphospholipid syndrome as beta-2 glycoprotein antibodies IgG and IgM are normal.  Anticardiolipin antibody IgM was only positive but not IgG.  Therefore the patient does not have a hypercoagulable state.  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  Patient will follow-up with her PCP concerning her joint complaints and possible rheumatologic consultation.  Advised the patient see us on a as needed basis.  Time spent: 63 minutes was spent on this patient visit : we spent time reviewing multiple lab results with patient, reviewed imaging results with the patient, we performed a history/physical, documented history/physical, and ordered follow-up with her primary care provider.    Cancer Staging   No matching staging information was found for the patient.        Signed by: Regulo Shipley MD    CC: Leonides Scott MD

## 2024-12-04 NOTE — NURSING NOTE
"Oncology Rooming Note    December 4, 2024 10:05 AM   Sherri Bennett is a 78 year old female who presents for:    Chief Complaint   Patient presents with    Oncology Clinic Visit     F/U Lupus     Initial Vitals: BP (!) 174/79   Pulse 62   Temp 98.4  F (36.9  C) (Tympanic)   Resp 20   Wt 55.8 kg (123 lb)   LMP 09/12/1998 (Approximate)   SpO2 98%   BMI 22.14 kg/m   Estimated body mass index is 22.14 kg/m  as calculated from the following:    Height as of 9/24/24: 1.588 m (5' 2.5\").    Weight as of this encounter: 55.8 kg (123 lb). Body surface area is 1.57 meters squared.  Severe Pain (7) Comment: Data Unavailable   Patient's last menstrual period was 09/12/1998 (approximate).  Allergies reviewed: Yes  Medications reviewed: Yes    Medications: Medication refills not needed today.  Pharmacy name entered into EPIC: [id number] ([employer]) - [place], [place] - [address]    Frailty Screening:   Is the patient here for a new oncology consult visit in cancer care? 2. No      Clinical concerns: None       Emily Arciniega CMA (AAMA)  "

## 2024-12-04 NOTE — PATIENT INSTRUCTIONS
No further appointments needed at this time.  Return to primary care provider and follow up with us as needed.

## 2024-12-16 ENCOUNTER — MYC MEDICAL ADVICE (OUTPATIENT)
Dept: INTERNAL MEDICINE | Facility: OTHER | Age: 79
End: 2024-12-16
Payer: COMMERCIAL

## 2024-12-16 NOTE — TELEPHONE ENCOUNTER
Attached AVS from 12/6 appt with Kwan Culp.     [facility]    Gladys Louis RN on 12/16/2024 at 3:52 PM

## 2025-02-14 PROBLEM — M85.80 OSTEOPENIA, UNSPECIFIED LOCATION: Status: ACTIVE | Noted: 2023-06-09

## 2025-02-26 SDOH — HEALTH STABILITY: PHYSICAL HEALTH: ON AVERAGE, HOW MANY MINUTES DO YOU ENGAGE IN EXERCISE AT THIS LEVEL?: 50 MIN

## 2025-02-26 SDOH — HEALTH STABILITY: PHYSICAL HEALTH: ON AVERAGE, HOW MANY DAYS PER WEEK DO YOU ENGAGE IN MODERATE TO STRENUOUS EXERCISE (LIKE A BRISK WALK)?: 1 DAY

## 2025-02-26 ASSESSMENT — ASTHMA QUESTIONNAIRES
QUESTION_3 LAST FOUR WEEKS HOW OFTEN DID YOUR ASTHMA SYMPTOMS (WHEEZING, COUGHING, SHORTNESS OF BREATH, CHEST TIGHTNESS OR PAIN) WAKE YOU UP AT NIGHT OR EARLIER THAN USUAL IN THE MORNING: NOT AT ALL
ACT_TOTALSCORE: 25
QUESTION_2 LAST FOUR WEEKS HOW OFTEN HAVE YOU HAD SHORTNESS OF BREATH: NOT AT ALL
QUESTION_4 LAST FOUR WEEKS HOW OFTEN HAVE YOU USED YOUR RESCUE INHALER OR NEBULIZER MEDICATION (SUCH AS ALBUTEROL): NOT AT ALL
ACT_TOTALSCORE: 25
QUESTION_5 LAST FOUR WEEKS HOW WOULD YOU RATE YOUR ASTHMA CONTROL: COMPLETELY CONTROLLED
QUESTION_1 LAST FOUR WEEKS HOW MUCH OF THE TIME DID YOUR ASTHMA KEEP YOU FROM GETTING AS MUCH DONE AT WORK, SCHOOL OR AT HOME: NONE OF THE TIME

## 2025-02-26 ASSESSMENT — SOCIAL DETERMINANTS OF HEALTH (SDOH): HOW OFTEN DO YOU GET TOGETHER WITH FRIENDS OR RELATIVES?: ONCE A WEEK

## 2025-03-03 PROBLEM — R60.0 EDEMA LEG: Status: ACTIVE | Noted: 2024-07-01

## 2025-03-04 ENCOUNTER — OFFICE VISIT (OUTPATIENT)
Dept: INTERNAL MEDICINE | Facility: OTHER | Age: 80
End: 2025-03-04
Attending: INTERNAL MEDICINE
Payer: MEDICARE

## 2025-03-04 VITALS
HEIGHT: 63 IN | BODY MASS INDEX: 21.71 KG/M2 | DIASTOLIC BLOOD PRESSURE: 60 MMHG | OXYGEN SATURATION: 99 % | HEART RATE: 58 BPM | SYSTOLIC BLOOD PRESSURE: 128 MMHG | TEMPERATURE: 98.4 F | RESPIRATION RATE: 14 BRPM | WEIGHT: 122.5 LBS

## 2025-03-04 DIAGNOSIS — I69.898 HISTORY OF LATERALIZING MOTOR DEFICIT FOLLOWING CEREBROVASCULAR ACCIDENT (CVA): ICD-10-CM

## 2025-03-04 DIAGNOSIS — E87.6 HYPOKALEMIA: ICD-10-CM

## 2025-03-04 DIAGNOSIS — M54.2 CHRONIC NECK PAIN: ICD-10-CM

## 2025-03-04 DIAGNOSIS — Z00.00 MEDICARE ANNUAL WELLNESS VISIT, SUBSEQUENT: Primary | ICD-10-CM

## 2025-03-04 DIAGNOSIS — G89.29 CHRONIC NECK PAIN: ICD-10-CM

## 2025-03-04 DIAGNOSIS — J45.20 ASTHMA, MILD INTERMITTENT, WELL-CONTROLLED: ICD-10-CM

## 2025-03-04 DIAGNOSIS — I69.322 DYSARTHRIA AS LATE EFFECT OF CEREBROVASCULAR ACCIDENT (CVA): ICD-10-CM

## 2025-03-04 DIAGNOSIS — I48.0 HYPERCOAGULABLE STATE DUE TO PAROXYSMAL ATRIAL FIBRILLATION (H): ICD-10-CM

## 2025-03-04 DIAGNOSIS — Z79.01 CHRONIC ANTICOAGULATION: ICD-10-CM

## 2025-03-04 DIAGNOSIS — Z12.31 ENCOUNTER FOR SCREENING MAMMOGRAM FOR BREAST CANCER: ICD-10-CM

## 2025-03-04 DIAGNOSIS — D68.69 HYPERCOAGULABLE STATE DUE TO PAROXYSMAL ATRIAL FIBRILLATION (H): ICD-10-CM

## 2025-03-04 DIAGNOSIS — Z71.85 VACCINE COUNSELING: ICD-10-CM

## 2025-03-04 DIAGNOSIS — Z91.09 ENVIRONMENTAL ALLERGIES: ICD-10-CM

## 2025-03-04 DIAGNOSIS — I10 BENIGN ESSENTIAL HYPERTENSION: ICD-10-CM

## 2025-03-04 DIAGNOSIS — M18.11 PRIMARY OSTEOARTHRITIS OF FIRST CARPOMETACARPAL JOINT OF RIGHT HAND: ICD-10-CM

## 2025-03-04 DIAGNOSIS — E78.2 MIXED HYPERLIPIDEMIA: ICD-10-CM

## 2025-03-04 PROCEDURE — G0463 HOSPITAL OUTPT CLINIC VISIT: HCPCS | Performed by: INTERNAL MEDICINE

## 2025-03-04 RX ORDER — DILTIAZEM HYDROCHLORIDE 120 MG/1
120 CAPSULE, EXTENDED RELEASE ORAL DAILY
Qty: 90 CAPSULE | Refills: 1 | Status: SHIPPED | OUTPATIENT
Start: 2025-03-04

## 2025-03-04 RX ORDER — MONTELUKAST SODIUM 10 MG/1
10 TABLET ORAL AT BEDTIME
Qty: 90 TABLET | Refills: 4 | Status: SHIPPED | OUTPATIENT
Start: 2025-03-04

## 2025-03-04 RX ORDER — LISINOPRIL 10 MG/1
10 TABLET ORAL DAILY
Qty: 90 TABLET | Refills: 4 | Status: SHIPPED | OUTPATIENT
Start: 2025-03-04

## 2025-03-04 RX ORDER — METOPROLOL TARTRATE 100 MG/1
50-100 TABLET ORAL 2 TIMES DAILY
Qty: 180 TABLET | Refills: 1 | Status: SHIPPED | OUTPATIENT
Start: 2025-03-04

## 2025-03-04 RX ORDER — PRAVASTATIN SODIUM 20 MG
20 TABLET ORAL DAILY
Qty: 90 TABLET | Refills: 4 | Status: SHIPPED | OUTPATIENT
Start: 2025-03-04

## 2025-03-04 RX ORDER — BACLOFEN 10 MG/1
10 TABLET ORAL 2 TIMES DAILY
Qty: 90 TABLET | Refills: 4 | Status: SHIPPED | OUTPATIENT
Start: 2025-03-04

## 2025-03-04 RX ORDER — POTASSIUM CHLORIDE 750 MG/1
10 TABLET, EXTENDED RELEASE ORAL DAILY
Qty: 90 TABLET | Refills: 4 | Status: SHIPPED | OUTPATIENT
Start: 2025-03-04

## 2025-03-04 ASSESSMENT — ENCOUNTER SYMPTOMS
COUGH: 0
AGITATION: 0
FEVER: 0
CONFUSION: 0
VOMITING: 0
PALPITATIONS: 0
CHILLS: 0
WHEEZING: 0
DIZZINESS: 0
WOUND: 0
HEMATURIA: 0
DYSURIA: 0
LIGHT-HEADEDNESS: 0
NAUSEA: 0
ARTHRALGIAS: 1
DIARRHEA: 0
SHORTNESS OF BREATH: 0
ABDOMINAL PAIN: 0
MYALGIAS: 0
BRUISES/BLEEDS EASILY: 1

## 2025-03-04 ASSESSMENT — PAIN SCALES - GENERAL: PAINLEVEL_OUTOF10: MODERATE PAIN (5)

## 2025-03-04 NOTE — PROGRESS NOTES
Preventive Care Visit  [facility] AND [facility]  Leonides Scott MD, Internal Medicine  Mar 4, 2025      Encounter for Medicare annual wellness exam    -Mammo done 6/7/23 (impression: negative). Follow-up 2 years.     -DEXA done 6/7/23 (impression: osteopenia).     -Colon cancer screening not on file. Declines.     -Immunizations: Patient is due for the following: N/A, current on vaccinations.    -Derm: Does patient regularly see dermatologist? No, plans to see dermatology professionals.     -Refills pended for requested medications.  -Labs current.    Counseling  Appropriate preventive services were addressed with this patient via screening, questionnaire, or discussion as appropriate for fall prevention, nutrition, physical activity, Tobacco-use cessation, social engagement, weight loss and cognition.  Checklist reviewing preventive services available has been given to the patient.  Reviewed patient's diet, addressing concerns and/or questions.   She is at risk for lack of exercise and has been provided with information to increase physical activity for the benefit of her well-being.   Patient reported safety concerns were addressed today.Information on urinary incontinence and treatment options given to patient.     Return in about 1 year (around 3/4/2026) for Annual Medicare Wellness Visit.      Sarah Polanco is a 79 year old, presenting for the following:  Medicare Visit        3/4/2025    10:53 AM   Additional Questions   Roomed by LUIS ANTONIO Prather             Health Care Directive  Patient does not have a Health Care Directive: Patient states has Advance Directive and will bring in a copy to clinic.        2/26/2025   General Health   How would you rate your overall physical health? Good   Feel stress (tense, anxious, or unable to sleep) Only a little   (!) STRESS CONCERN      2/26/2025   Nutrition   Diet: Low salt         2/26/2025   Exercise   Days per week of moderate/strenous exercise 1 day   Average  minutes spent exercising at this level 50 min   (!) EXERCISE CONCERN      2/26/2025   Social Factors   Frequency of gathering with friends or relatives Once a week   Worry food won't last until get money to buy more No   Food not last or not have enough money for food? No   Do you have housing? (Housing is defined as stable permanent housing and does not include staying ouside in a car, in a tent, in an abandoned building, in an overnight shelter, or couch-surfing.) Yes   Are you worried about losing your housing? No   Lack of transportation? No   Unable to get utilities (heat,electricity)? No         3/4/2025   Fall Risk   Gait Speed Test Interpretation Less than or equal to 5.00 seconds - PASS           2/26/2025   Activities of Daily Living- Home Safety   Needs help with the following daily activites None of the above   Safety concerns in the home No grab bars in the bathroom         2/26/2025   Dental   Dentist two times every year? Yes         2/26/2025   Hearing Screening   Hearing concerns? None of the above         2/26/2025   Driving Risk Screening   Patient/family members have concerns about driving No         2/26/2025   General Alertness/Fatigue Screening   Have you been more tired than usual lately? No         2/26/2025   Urinary Incontinence Screening   Bothered by leaking urine in past 6 months Yes            Today's PHQ-2 Score:       3/4/2025    10:42 AM   PHQ-2 ( 1999 [employer])   Q1: Little interest or pleasure in doing things 0   Q2: Feeling down, depressed or hopeless 1   PHQ-2 Score 1    Q1: Little interest or pleasure in doing things Not at all   Q2: Feeling down, depressed or hopeless Several days   PHQ-2 Score 1       Patient-reported           2/26/2025   Substance Use   Alcohol more than 3/day or more than 7/wk No   Do you have a current opioid prescription? No   How severe/bad is pain from 1 to 10? 6/10   Do you use any other substances recreationally? No     Social History     Tobacco Use  "   Smoking status: Never     Passive exposure: Never    Smokeless tobacco: Never   Vaping Use    Vaping status: Never Used   Substance Use Topics    Alcohol use: Yes     Comment: 2 oz Rum per day and 1 wine per week    Drug use: Never                Reviewed and updated as needed this visit by Provider   Tobacco  Allergies  Meds  Problems  Med Hx  Surg Hx  Fam Hx                   Current providers sharing in care for this patient include:  Patient Care Team:  Leonides Scott MD as PCP - General (Internal Medicine)  Jose Guadalupe Calvin MD as Assigned Heart and Vascular Provider  Leonides Scott MD as Assigned PCP  Martin Antonio MD as Assigned Surgical Provider  Cezar Tian MD as Assigned Musculoskeletal Provider  Regulo Shipley MD as Assigned Cancer Care Provider    The following health maintenance items are reviewed in Epic and correct as of today:  Health Maintenance   Topic Date Due    ASTHMA ACTION PLAN  Never done    COVID-19 Vaccine (8 - 2024-25 season) 04/15/2025    DEXA  06/07/2025    MAMMO SCREENING  06/07/2025    LIPID  06/25/2025    ASTHMA CONTROL TEST  09/04/2025    BMP  11/27/2025    MEDICARE ANNUAL WELLNESS VISIT  03/04/2026    FALL RISK ASSESSMENT  03/04/2026    GLUCOSE  11/27/2027    ADVANCE CARE PLANNING  03/04/2030    DTAP/TDAP/TD IMMUNIZATION (3 - Td or Tdap) 02/01/2033    HEPATITIS C SCREENING  Completed    PHQ-2 (once per calendar year)  Completed    INFLUENZA VACCINE  Completed    Pneumococcal Vaccine: 50+ Years  Completed    ZOSTER IMMUNIZATION  Completed    RSV VACCINE  Completed    HPV IMMUNIZATION  Aged Out    MENINGITIS IMMUNIZATION  Aged Out        Objective      Exam  /60   Pulse 58   Temp 98.4  F (36.9  C)   Resp 14   Ht 1.588 m (5' 2.5\")   Wt 55.6 kg (122 lb 8 oz)   LMP 09/12/1998 (Approximate)   SpO2 99%   BMI 22.05 kg/m           3/4/2025   Mini Cog   Clock Draw Score 2 Normal   3 Item Recall 3 objects recalled   Mini Cog Total Score 5          "   Signed Electronically by: Leonides Scott MD

## 2025-03-04 NOTE — PATIENT INSTRUCTIONS
Blood pressure at home has been well controlled.     Medications refilled.   -- changes below....       Heart rates have been too low and I suspect diltiazem is causing problems with leg swelling.    Hopefully lower dose diltiazem will be well-tolerated like your previous calcium channel blocker called amlodipine/Norvasc.    Recommend dose reduction of  Diltiazem down to 120 mg once daily    If your heart rate start to climb we will need to increase your metoprolol dose.  Start with 50 mg 3 times daily.  If needed increase up to 100 mg twice daily.        If the lower dose diltiazem does not fix the leg swelling issue we may need to get rid of it entirely.          Consider sports medicine or orthopedic clinic evaluation, call and schedule at your convenience.     [facility] -- Please call [phone] to schedule your appointment at [facility].     [facility] -- Main Phone Number in [place]: [phone]    Or     Sports Medicine at [facility] - Dr. Skinny Tellez  Phone: [phone]          Return in approximately 1 year, or sooner as needed for follow-up with Dr. Scott.  - Annual Follow-up / Physical - Medicare Annual Wellness Visit     Clinic : [phone]  Appointment line: [phone]         Patient Education   Preventive Care Advice   This is general advice given by our system to help you stay healthy. However, your care team may have specific advice just for you. Please talk to your care team about your preventive care needs.  Nutrition  Eat 5 or more servings of fruits and vegetables each day.  Try wheat bread, brown rice and whole grain pasta (instead of white bread, rice, and pasta).  Get enough calcium and vitamin D. Check the label on foods and aim for 100% of the RDA (recommended daily allowance).  Lifestyle  Exercise at least 150 minutes each week  (30 minutes a day, 5 days a week).  Do muscle strengthening activities 2  days a week. These help control your weight and prevent disease.  No smoking.  Wear sunscreen to prevent skin cancer.  Have a dental exam and cleaning every 6 months.  Yearly exams  See your health care team every year to talk about:  Any changes in your health.  Any medicines your care team has prescribed.  Preventive care, family planning, and ways to prevent chronic diseases.  Shots (vaccines)   HPV shots (up to age 26), if you've never had them before.  Hepatitis B shots (up to age 59), if you've never had them before.  COVID-19 shot: Get this shot when it's due.  Flu shot: Get a flu shot every year.  Tetanus shot: Get a tetanus shot every 10 years.  Pneumococcal, hepatitis A, and RSV shots: Ask your care team if you need these based on your risk.  Shingles shot (for age 50 and up)  General health tests  Diabetes screening:  Starting at age 35, Get screened for diabetes at least every 3 years.  If you are younger than age 35, ask your care team if you should be screened for diabetes.  Cholesterol test: At age 39, start having a cholesterol test every 5 years, or more often if advised.  Bone density scan (DEXA): At age 50, ask your care team if you should have this scan for osteoporosis (brittle bones).  Hepatitis C: Get tested at least once in your life.  STIs (sexually transmitted infections)  Before age 24: Ask your care team if you should be screened for STIs.  After age 24: Get screened for STIs if you're at risk. You are at risk for STIs (including HIV) if:  You are sexually active with more than one person.  You don't use condoms every time.  You or a partner was diagnosed with a sexually transmitted infection.  If you are at risk for HIV, ask about PrEP medicine to prevent HIV.  Get tested for HIV at least once in your life, whether you are at risk for HIV or not.  Cancer screening tests  Cervical cancer screening: If you have a cervix, begin getting regular cervical cancer screening tests starting at age  21.  Breast cancer scan (mammogram): If you've ever had breasts, begin having regular mammograms starting at age 40. This is a scan to check for breast cancer.  Colon cancer screening: It is important to start screening for colon cancer at age 45.  Have a colonoscopy test every 10 years (or more often if you're at risk) Or, ask your provider about stool tests like a FIT test every year or Cologuard test every 3 years.  To learn more about your testing options, visit:   .  For help making a decision, visit:   https://bit.ly/au95950.  Prostate cancer screening test: If you have a prostate, ask your care team if a prostate cancer screening test (PSA) at age 55 is right for you.  Lung cancer screening: If you are a current or former smoker ages 50 to 80, ask your care team if ongoing lung cancer screenings are right for you.  For informational purposes only. Not to replace the advice of your health care provider. Copyright   2023 [facility]. All rights reserved. Clinically reviewed by the [facility] Transitions Program. [employer] 474170 - REV 01/24.  Learning About Activities of Daily Living  What are activities of daily living?     Activities of daily living (ADLs) are the basic self-care tasks you do every day. These include eating, bathing, dressing, and moving around.  As you age, and if you have health problems, you may find that it's harder to do some of these tasks. If so, your doctor can suggest ideas that may help.  To measure what kind of help you may need, your doctor will ask how well you are able to do ADLs. Let your doctor know if there are any tasks that you are having trouble doing. This is an important first step to getting help. And when you have the help you need, you can stay as independent as possible.  How will a doctor assess your ADLs?  Asking about ADLs is part of a routine health checkup your doctor will likely do as you age. Your health check might be done in a doctor's  office, in your home, or at a hospital. The goal is to find out if you are having any problems that could make it hard to care for yourself or that make it unsafe for you to be on your own.  To measure your ADLs, your doctor will ask how hard it is for you to do routine tasks. Your doctor may also want to know if you have changed the way you do a task because of a health problem. Your doctor may watch how you:  Walk back and forth.  Keep your balance while you stand or walk.  Move from sitting to standing or from a bed to a chair.  Button or unbutton a shirt or sweater.  Remove and put on your shoes.  It's common to feel a little worried or anxious if you find you can't do all the things you used to be able to do. Talking with your doctor about ADLs is a way to make sure you're as safe as possible and able to care for yourself as well as you can. You may want to bring a caregiver, friend, or family member to your checkup. They can help you talk to your doctor.  Follow-up care is a key part of your treatment and safety. Be sure to make and go to all appointments, and call your doctor if you are having problems. It's also a good idea to know your test results and keep a list of the medicines you take.  Current as of: October 24, 2023  Content Version: 14.3    2024 [employer].   Care instructions adapted under license by your healthcare professional. If you have questions about a medical condition or this instruction, always ask your healthcare professional. [employer] disclaims any warranty or liability for your use of this information.    Preventing Falls: Care Instructions  Injuries and health problems such as trouble walking or poor eyesight can increase your risk of falling. So can some medicines. But there are things you can do to help prevent falls. You can exercise to get stronger. You can also arrange your home to make it safer.    Talk to your doctor about the medicines you take. Ask  "if any of them increase the risk of falls and whether they can be changed or stopped.   Try to exercise regularly. It can help improve your strength and balance. This can help lower your risk of falling.         Practice fall safety and prevention.   Wear low-heeled shoes that fit well and give your feet good support. Talk to your doctor if you have foot problems that make this hard.  Carry a cellphone or wear a medical alert device that you can use to call for help.  Use stepladders instead of chairs to reach high objects. Don't climb if you're at risk for falls. Ask for help, if needed.  Wear the correct eyeglasses, if you need them.        Make your home safer.   Remove rugs, cords, clutter, and furniture from walkways.  Keep your house well lit. Use night-lights in hallways and bathrooms.  Install and use sturdy handrails on stairways.  Wear nonskid footwear, even inside. Don't walk barefoot or in socks without shoes.        Be safe outside.   Use handrails, curb cuts, and ramps whenever possible.  Keep your hands free by using a shoulder bag or backpack.  Try to walk in well-lit areas. Watch out for uneven ground, changes in pavement, and debris.  Be careful in the winter. Walk on the grass or gravel when sidewalks are slippery. Use de-icer on steps and walkways. Add non-slip devices to shoes.    Put grab bars and nonskid mats in your shower or tub and near the toilet. Try to use a shower chair or bath bench when bathing.   Get into a tub or shower by putting in your weaker leg first. Get out with your strong side first. Have a phone or medical alert device in the bathroom with you.   Where can you learn more?  Go to https://www.[employer].net/patiented  Enter G117 in the search box to learn more about \"Preventing Falls: Care Instructions.\"  Current as of: July 31, 2024  Content Version: 14.3    2024 [facility] [employer].   Care instructions adapted under license by your healthcare professional. If you have " questions about a medical condition or this instruction, always ask your healthcare professional. [employer] disclaims any warranty or liability for your use of this information.    Bladder Training: Care Instructions  Your Care Instructions     Bladder training is used to treat urge incontinence and stress incontinence. Urge incontinence means that the need to urinate comes on so fast that you can't get to a toilet in time. Stress incontinence means that you leak urine because of pressure on your bladder. For example, it may happen when you laugh, cough, or lift something heavy.  Bladder training can increase how long you can wait before you have to urinate. It can also help your bladder hold more urine. And it can give you better control over the urge to urinate.  It is important to remember that bladder training takes a few weeks to a few months to make a difference. You may not see results right away, but don't give up.  Follow-up care is a key part of your treatment and safety. Be sure to make and go to all appointments, and call your doctor if you are having problems. It's also a good idea to know your test results and keep a list of the medicines you take.  How can you care for yourself at home?  Work with your doctor to come up with a bladder training program that is right for you. You may use one or more of the following methods.  Delayed urination  In the beginning, try to keep from urinating for 5 minutes after you first feel the need to go.  While you wait, take deep, slow breaths to relax. Kegel exercises can also help you delay the need to go to the bathroom.  After some practice, when you can easily wait 5 minutes to urinate, try to wait 10 minutes before you urinate.  Slowly increase the waiting period until you are able to control when you have to urinate.  Scheduled urination  Empty your bladder when you first wake up in the morning.  Schedule times throughout the day when you will  "urinate.  Start by going to the bathroom every hour, even if you don't need to go.  Slowly increase the time between trips to the bathroom.  When you have found a schedule that works well for you, keep doing it.  If you wake up during the night and have to urinate, do it. Apply your schedule to waking hours only.  Kegel exercises  These tighten and strengthen pelvic muscles, which can help you control the flow of urine. (If doing these exercises causes pain, stop doing them and talk with your doctor.) To do Kegel exercises:  Squeeze your muscles as if you were trying not to pass gas. Or squeeze your muscles as if you were stopping the flow of urine. Your belly, legs, and buttocks shouldn't move.  Hold the squeeze for 3 seconds, then relax for 5 to 10 seconds.  Start with 3 seconds, then add 1 second each week until you are able to squeeze for 10 seconds.  Repeat the exercise 10 times a session. Do 3 to 8 sessions a day.  When should you call for help?  Watch closely for changes in your health, and be sure to contact your doctor if:    Your incontinence is getting worse.     You do not get better as expected.   Where can you learn more?  Go to https://www.[employer].net/patiented  Enter V684 in the search box to learn more about \"Bladder Training: Care Instructions.\"  Current as of: April 30, 2024  Content Version: 14.3    2024 [employer].   Care instructions adapted under license by your healthcare professional. If you have questions about a medical condition or this instruction, always ask your healthcare professional. [employer] disclaims any warranty or liability for your use of this information.       "

## 2025-03-04 NOTE — PROGRESS NOTES
Assessment & Plan     ICD-10-CM    1. Medicare annual wellness visit, subsequent  Z00.00       2. Vaccine counseling  Z71.85       3. 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 Hypercoagulable state due to paroxysmal atrial fibrillation (H)  D68.69 apixaban ANTICOAGULANT (ELIQUIS) 5 MG tablet    I48.0 diltiazem ER (TIAZAC) 120 MG 24 hr ER beaded capsule     metoprolol tartrate (LOPRESSOR) 100 MG tablet      6. Chronic anticoagulation - Eliquis  Z79.01 apixaban ANTICOAGULANT (ELIQUIS) 5 MG tablet      7. Chronic neck pain  M54.2 baclofen (LIORESAL) 10 MG tablet    G89.29       8. Benign essential hypertension  I10 lisinopril (ZESTRIL) 10 MG tablet      9. Hypokalemia  E87.6 potassium chloride maame ER (KLOR-CON M10) 10 MEQ CR tablet      10. Mixed hyperlipidemia  E78.2 pravastatin (PRAVACHOL) 20 MG tablet      11. Asthma, mild intermittent, well-controlled  J45.20       12. Encounter for screening mammogram for breast cancer  Z12.31 MA Screening Bilateral w/ Marcin      13. Environmental allergies  Z91.09 montelukast (SINGULAIR) 10 MG tablet      14. Primary osteoarthritis of first carpometacarpal joint of right hand  M18.11          Patient presents for Medicare annual wellness visit as well as follow-up multiple issues    Patient has been doing well since her stroke.  Continues on pravastatin, Eliquis.  Unfortunately she has been having problems with leg swelling for last few weeks, which seems to have started up since her stroke and medication changes.    States that she was on amlodipine 5 up to 10 mg in the past and tolerated that well.  Unfortunately it appears diltiazem seems to be at play here with causing lower extremity edema.    Patient has atrial  fibrillation and hypercoagulable state.  Continues on Eliquis.  Heart rates are currently controlled actually has been having some bradycardia issues at times.  Recommend dose reduction of diltiazem.  May need to even consider discontinuation of diltiazem in the future.  Dose increase of metoprolol currently 50 mg twice daily.  Advised that she might need to increase up to 50 mg 3 times daily or even consider dose increased to 100 mg twice daily.  She has blood pressure and heart rate monitoring equipment at home.    Chronic neck pain, spasm baclofen as needed for neck muscle spasms which has been helpful and effective.    Blood pressure is currently well-controlled but has been having some bradycardia as noted above.    Hypokalemia, continues with oral potassium replacement.  Needs refills.    Mild intermittent asthma, exercise-induced.  Currently stable.    Mammogram is due.  Orders placed.    Environmental allergies, ongoing but improved with Singulair.  Needs refills.    Left knee arthritis, right knee arthritis, right basilar thumb joint arthritis.  Had a steroid injection in her right knee with good pain relieving results.  She is considering getting a steroid injection in her left knee.  She had surgery on her left basilar thumb joints but is now having worsening pain in discomfort with her right basilar thumb joint and would like to see   Hand orthopedic surgery.  She will call to schedule an appointment.    HYPERTENSION - Ongoing. Blood pressure is currently well controlled.  Denies syncope or presyncope.  Continue current medications.   Medication list reviewed/updated. Refills completed as needed.       + Leg swelling - probably from Diltiazem.   Med changes today.        MIXED HYPERLIPIDEMIA.  Ongoing. LDL is at goal: Yes. Triglycerides are at goal: Yes.    Medication side effects reported: None.   Continue current medications for now. Medication list reviewed/updated. Refills completed as  needed.  Recent Labs   Lab Test 06/25/24  0422 06/24/24  0552   CHOL 126  --    HDL 43*  --    LDL 60 63   TRIG 113  --         Atrial Fibrillation - Type: Paroxysmal Atrial Fibrillation, chronic, ongoing.  + Hypercoagulable state due to atrial fibrillation.  Continues with apixaban (ELIQUIS) for oral anticoagulation.  Heart rates are controlled with metoprolol and diltiazem.  Tolerating well.  Denies excessive bleeding issues.      + Leg swelling - probably from Diltiazem.   Med changes today.     Easy bruising. Medication list reviewed/updated. Refills completed as needed.       Vaccine counseling completed.  Encourage routine / annual vaccinations.    The longitudinal plan of care for the diagnosis(es)/condition(s) as documented were addressed during this visit. Due to the added complexity in care, I will continue to support Sherri in the subsequent management and with ongoing continuity of care.               Counseling  Appropriate preventive services were addressed with this patient via screening, questionnaire, or discussion as appropriate for fall prevention, nutrition, physical activity, Tobacco-use cessation, social engagement, weight loss and cognition.  Checklist reviewing preventive services available has been given to the patient.  Reviewed patient's diet, addressing concerns and/or questions.   She is at risk for lack of exercise and has been provided with information to increase physical activity for the benefit of her well-being.   Patient reported safety concerns were addressed today.Information on urinary incontinence and treatment options given to patient.         Return in about 1 year (around 3/4/2026) for Annual Medicare Wellness Visit.      Leonides Scott MD  [facility]    Review of Systems   Constitutional:  Negative for chills and fever.   HENT:  Negative for congestion and hearing loss.    Eyes:  Negative for visual disturbance.   Respiratory:  Negative for cough,  "shortness of breath and wheezing.    Cardiovascular:  Negative for chest pain and palpitations.   Gastrointestinal:  Negative for abdominal pain, diarrhea, nausea and vomiting.   Endocrine: Negative for cold intolerance and heat intolerance.   Genitourinary:  Negative for dysuria and hematuria.   Musculoskeletal:  Positive for arthralgias (Right thumb and left knee pain, right knee doing better after steroid injection with orthopedics). Negative for myalgias.   Skin:  Negative for rash and wound.   Allergic/Immunologic: Negative for immunocompromised state.   Neurological:  Negative for dizziness and light-headedness.        Minimal dysarthria when tired - following stroke   Hematological:  Bruises/bleeds easily.   Psychiatric/Behavioral:  Negative for agitation and confusion.        Sarah Polanco is a 79 year old, presenting for the following health issues:  Medicare Visit        3/4/2025    10:53 AM   Additional Questions   Roomed by LUIS ANTONIO Prather     HPI                    Objective    /60   Pulse 58   Temp 98.4  F (36.9  C)   Resp 14   Ht 1.588 m (5' 2.5\")   Wt 55.6 kg (122 lb 8 oz)   LMP 09/12/1998 (Approximate)   SpO2 99%   BMI 22.05 kg/m    Body mass index is 22.05 kg/m .  Physical Exam  Constitutional:       General: She is not in acute distress.     Appearance: Normal appearance. She is well-developed. She is not ill-appearing.   Eyes:      General: No scleral icterus.     Conjunctiva/sclera: Conjunctivae normal.   Neck:      Vascular: No carotid bruit.   Cardiovascular:      Rate and Rhythm: Regular rhythm. Bradycardia present.      Pulses: Normal pulses.   Pulmonary:      Effort: Pulmonary effort is normal. No respiratory distress.      Breath sounds: No wheezing.   Abdominal:      Palpations: Abdomen is soft.      Tenderness: There is no abdominal tenderness.   Musculoskeletal:         General: Deformity (Right basilar thumb joint and left knee arthritis) present. No tenderness.      Right " lower le+ Pitting Edema present.      Left lower le+ Pitting Edema present.   Skin:     General: Skin is warm and dry.      Findings: No rash.   Neurological:      Mental Status: She is alert. Mental status is at baseline.      Cranial Nerves: No cranial nerve deficit.   Psychiatric:         Mood and Affect: Mood normal.         Behavior: Behavior normal.                    Signed Electronically by: Leonides Scott MD

## 2025-03-17 ENCOUNTER — MYC MEDICAL ADVICE (OUTPATIENT)
Dept: INTERNAL MEDICINE | Facility: OTHER | Age: 80
End: 2025-03-17
Payer: COMMERCIAL

## 2025-03-17 NOTE — TELEPHONE ENCOUNTER
Swelling in legs, ankles, and feet has improved but is not gone since changing Diltiazem dosing. Compression socks help.  HR is 52-54 at rest and 70 with activity. Wondering if there is a new plan?     Per OV from 3/4:  Heart rates are currently controlled actually has been having some bradycardia issues at times. Recommend dose reduction of diltiazem. May need to even consider discontinuation of diltiazem in the future. Dose increase of metoprolol currently 50 mg twice daily. Advised that she might need to increase up to 50 mg 3 times daily or even consider dose increased to 100 mg twice daily. She has blood pressure and heart rate monitoring equipment at home.   + Leg swelling - probably from Diltiazem.   Med changes today.       Routing to provider to review and respond.  Nehemiah Berger RN on 3/17/2025 at 3:51 PM

## 2025-03-18 ENCOUNTER — OFFICE VISIT (OUTPATIENT)
Dept: INTERNAL MEDICINE | Facility: OTHER | Age: 80
End: 2025-03-18
Attending: INTERNAL MEDICINE
Payer: COMMERCIAL

## 2025-03-18 VITALS
BODY MASS INDEX: 21.62 KG/M2 | RESPIRATION RATE: 14 BRPM | DIASTOLIC BLOOD PRESSURE: 68 MMHG | WEIGHT: 122 LBS | SYSTOLIC BLOOD PRESSURE: 132 MMHG | OXYGEN SATURATION: 96 % | HEIGHT: 63 IN | HEART RATE: 78 BPM

## 2025-03-18 DIAGNOSIS — G89.29 CHRONIC PAIN OF BOTH KNEES: ICD-10-CM

## 2025-03-18 DIAGNOSIS — R00.1 BRADYCARDIA: ICD-10-CM

## 2025-03-18 DIAGNOSIS — I10 BENIGN ESSENTIAL HYPERTENSION: ICD-10-CM

## 2025-03-18 DIAGNOSIS — M25.562 CHRONIC PAIN OF BOTH KNEES: ICD-10-CM

## 2025-03-18 DIAGNOSIS — I48.0 HYPERCOAGULABLE STATE DUE TO PAROXYSMAL ATRIAL FIBRILLATION (H): ICD-10-CM

## 2025-03-18 DIAGNOSIS — D68.69 HYPERCOAGULABLE STATE DUE TO PAROXYSMAL ATRIAL FIBRILLATION (H): ICD-10-CM

## 2025-03-18 DIAGNOSIS — M25.561 CHRONIC PAIN OF BOTH KNEES: ICD-10-CM

## 2025-03-18 DIAGNOSIS — R60.0 BILATERAL LOWER EXTREMITY EDEMA: Primary | ICD-10-CM

## 2025-03-18 RX ORDER — COLCHICINE 0.6 MG/1
0.6 TABLET ORAL DAILY
Qty: 90 TABLET | Refills: 4 | Status: SHIPPED | OUTPATIENT
Start: 2025-03-18

## 2025-03-18 ASSESSMENT — ENCOUNTER SYMPTOMS
DIARRHEA: 0
WOUND: 0
VOMITING: 0
BRUISES/BLEEDS EASILY: 1
DIZZINESS: 0
ARTHRALGIAS: 1
ABDOMINAL PAIN: 0
LIGHT-HEADEDNESS: 0
NAUSEA: 0
WHEEZING: 0
PALPITATIONS: 0
MYALGIAS: 0
SHORTNESS OF BREATH: 0
CHILLS: 0
AGITATION: 0
COUGH: 0
CONFUSION: 0
HEMATURIA: 0
FEVER: 0
DYSURIA: 0

## 2025-03-18 ASSESSMENT — PAIN SCALES - GENERAL: PAINLEVEL_OUTOF10: SEVERE PAIN (7)

## 2025-03-18 NOTE — TELEPHONE ENCOUNTER
Patient was discussed with Dr. Scott. He offered today at 1:40pm. Patient was contacted, date and time works for her and she has been added to the schedule.  Beatris Aquino on 3/18/2025 at 9:32 AM

## 2025-03-18 NOTE — PROGRESS NOTES
Assessment & Plan     ICD-10-CM    1. Bilateral lower extremity edema  R60.0       2. Bradycardia  R00.1       3. Benign essential hypertension  I10       4. Hypercoagulable state due to paroxysmal atrial fibrillation (H)  D68.69     I48.0       5. Chronic pain of both knees  M25.561 colchicine (COLCRYS) 0.6 MG tablet    M25.562     G89.29          Patient presents for follow-up multiple issues.    Heart rates are still low despite reducing her diltiazem in half.  Did notice at least 50% improvement in leg swelling however.    Recommend discontinuation of diltiazem.  Monitor heart rates.  Continue close follow-up as needed.    Bilateral knee pain.  Start trial of colchicine.  See below.  If needed can consider adding turmeric as well.  Tylenol does very little.  She needs to avoid NSAIDs due to her Eliquis use.    Advised that if she does need to increase the dose of colchicine from 1 tablet daily to 2 tablets daily, we will need to consider reducing pravastatin dose or frequency of dose.  She will let us know if we need to make changes.      HYPERTENSION - Ongoing. Blood pressure is currently well controlled, but HR is low.  Medication side effects: Yes - + Bradycardia . Denies syncope or presyncope.  Medication list reviewed/updated. Refills completed as needed.     Blood pressure is better.... but pulse and leg swelling are still off.     -- Stop Diltiazem completely....     -- Consider Tumeric - for joint pain.       Start Colchicine -- once daily for joint pain.     If needed, can increase to 2x daily... but may need to watch for diarrhea, and would need to reduce dose of your pravastatin.       Continue Metoprolol at 50 mg twice daily for now.....   -- If heart rate increases and/or you get heart palpitations.... may need to increase the metoprolol up to 50 mg 3 times daily or even possibly 100 mg twice daily.       Atrial Fibrillation - Type: Paroxysmal Atrial Fibrillation, chronic, ongoing.  +  Hypercoagulable state due to atrial fibrillation.  Continues with apixaban (ELIQUIS) for oral anticoagulation.  Heart rates are controlled with metoprolol.  Tolerating well.  Denies excessive bleeding issues.  Minimal bruising. Medication list reviewed/updated. Refills completed as needed.       The longitudinal plan of care for the diagnosis(es)/condition(s) as documented were addressed during this visit. Due to the added complexity in care, I will continue to support Sherri in the subsequent management and with ongoing continuity of care.                   Return for follow-up as needed with Dr. Scott., Appointments: [phone].      Leonides Scott MD  [facility]    Review of Systems   Constitutional:  Negative for chills and fever.   HENT:  Negative for congestion and hearing loss.    Eyes:  Negative for visual disturbance.   Respiratory:  Negative for cough, shortness of breath and wheezing.    Cardiovascular:  Negative for chest pain and palpitations.   Gastrointestinal:  Negative for abdominal pain, diarrhea, nausea and vomiting.   Endocrine: Negative for cold intolerance and heat intolerance.   Genitourinary:  Negative for dysuria and hematuria.   Musculoskeletal:  Positive for arthralgias (Right thumb and left knee pain, right knee doing better after steroid injection with orthopedics -> but today with much worse knee pain). Negative for myalgias.   Skin:  Negative for rash and wound.   Allergic/Immunologic: Negative for immunocompromised state.   Neurological:  Negative for dizziness and light-headedness.        Minimal dysarthria when tired - following stroke   Hematological:  Bruises/bleeds easily.   Psychiatric/Behavioral:  Negative for agitation and confusion.          Sarah Polanco is a 79 year old, presenting for the following health issues:  Recheck Medication        3/18/2025     1:25 PM   Additional Questions   Roomed by RADHA Wellington     History of Present Illness  "      Reason for visit:  Medication changes + Leg swelling    She eats 4 or more servings of fruits and vegetables daily.She consumes 0 sweetened beverage(s) daily.She exercises with enough effort to increase her heart rate 20 to 29 minutes per day.                        Objective    /68   Pulse 78   Resp 14   Ht 1.588 m (5' 2.5\")   Wt 55.3 kg (122 lb)   LMP 1998 (Approximate)   SpO2 96%   Breastfeeding No   BMI 21.96 kg/m    Body mass index is 21.96 kg/m .  Physical Exam  Constitutional:       General: She is not in acute distress.     Appearance: Normal appearance. She is well-developed. She is not ill-appearing.   Eyes:      General: No scleral icterus.     Conjunctiva/sclera: Conjunctivae normal.   Neck:      Vascular: No carotid bruit.   Cardiovascular:      Rate and Rhythm: Regular rhythm. Bradycardia present.      Pulses: Normal pulses.   Pulmonary:      Effort: Pulmonary effort is normal. No respiratory distress.      Breath sounds: No wheezing.   Abdominal:      Palpations: Abdomen is soft.      Tenderness: There is no abdominal tenderness.   Musculoskeletal:         General: Deformity (Right basilar thumb joint and left knee arthritis) present. No tenderness.      Right lower le+ Pitting Edema present.      Left lower le+ Pitting Edema present.   Skin:     General: Skin is warm and dry.      Findings: No rash.   Neurological:      Mental Status: She is alert. Mental status is at baseline.      Cranial Nerves: No cranial nerve deficit.   Psychiatric:         Mood and Affect: Mood normal.         Behavior: Behavior normal.                    Signed Electronically by: Leonides Scott MD    "

## 2025-03-18 NOTE — NURSING NOTE
"Chief Complaint   Patient presents with    Recheck Medication       Initial BP (!) 148/96   Pulse 78   Resp 14   Ht 1.588 m (5' 2.5\")   Wt 55.3 kg (122 lb)   LMP 09/12/1998 (Approximate)   SpO2 96%   Breastfeeding No   BMI 21.96 kg/m   Estimated body mass index is 21.96 kg/m  as calculated from the following:    Height as of this encounter: 1.588 m (5' 2.5\").    Weight as of this encounter: 55.3 kg (122 lb).  Medication Review: complete    The next two questions are to help us understand your food security.  If you are feeling you need any assistance in this area, we have resources available to support you today.          2/26/2025   SDOH- Food Insecurity   Within the past 12 months, did you worry that your food would run out before you got money to buy more? N   Within the past 12 months, did the food you bought just not last and you didn t have money to get more? N         Health Care Directive:  Patient does not have a Health Care Directive: Discussed advance care planning with patient; however, patient declined at this time.    Aura Logan LPN      "

## 2025-03-18 NOTE — PATIENT INSTRUCTIONS
Blood pressure is better.... but pulse and leg swelling are still off.     -- Stop Diltiazem completely....       -- Consider Tumeric - for joint pain.         Start Colchicine -- once daily for joint pain.     If needed, can increase to 2x daily... but may need to watch for diarrhea, and would need to reduce dose of your pravastatin.         Continue Metoprolol at 50 mg twice daily for now.....   -- If heart rate increases and/or you get heart palpitations.... may need to increase the metoprolol up to 50 mg 3 times daily or even possibly 100 mg twice daily.      Return as needed for follow-up with Dr. Scott.    Clinic : [phone]  Appointment line: [phone]

## 2025-03-20 ENCOUNTER — APPOINTMENT (OUTPATIENT)
Dept: GENERAL RADIOLOGY | Facility: OTHER | Age: 80
End: 2025-03-20
Attending: FAMILY MEDICINE
Payer: MEDICARE

## 2025-03-20 ENCOUNTER — HOSPITAL ENCOUNTER (EMERGENCY)
Facility: OTHER | Age: 80
Discharge: HOME | End: 2025-03-20
Attending: FAMILY MEDICINE
Payer: MEDICARE

## 2025-03-20 VITALS
TEMPERATURE: 99 F | HEIGHT: 62 IN | WEIGHT: 122 LBS | SYSTOLIC BLOOD PRESSURE: 153 MMHG | RESPIRATION RATE: 16 BRPM | DIASTOLIC BLOOD PRESSURE: 76 MMHG | HEART RATE: 63 BPM | BODY MASS INDEX: 22.45 KG/M2 | OXYGEN SATURATION: 94 %

## 2025-03-20 DIAGNOSIS — R50.9 FEVER, UNSPECIFIED FEVER CAUSE: ICD-10-CM

## 2025-03-20 LAB
ALBUMIN SERPL BCG-MCNC: 4.5 G/DL (ref 3.5–5.2)
ALBUMIN UR-MCNC: NEGATIVE MG/DL
ALP SERPL-CCNC: 77 U/L (ref 40–150)
ALT SERPL W P-5'-P-CCNC: 17 U/L (ref 0–50)
ANION GAP SERPL CALCULATED.3IONS-SCNC: 13 MMOL/L (ref 7–15)
APPEARANCE UR: CLEAR
AST SERPL W P-5'-P-CCNC: 24 U/L (ref 0–45)
BASOPHILS # BLD AUTO: 0 10E3/UL (ref 0–0.2)
BASOPHILS NFR BLD AUTO: 1 %
BILIRUB SERPL-MCNC: 0.5 MG/DL
BILIRUB UR QL STRIP: NEGATIVE
BUN SERPL-MCNC: 9 MG/DL (ref 8–23)
CALCIUM SERPL-MCNC: 9.2 MG/DL (ref 8.8–10.4)
CHLORIDE SERPL-SCNC: 99 MMOL/L (ref 98–107)
COLOR UR AUTO: YELLOW
CREAT SERPL-MCNC: 0.52 MG/DL (ref 0.51–0.95)
EGFRCR SERPLBLD CKD-EPI 2021: >90 ML/MIN/1.73M2
EOSINOPHIL # BLD AUTO: 0 10E3/UL (ref 0–0.7)
EOSINOPHIL NFR BLD AUTO: 0 %
ERYTHROCYTE [DISTWIDTH] IN BLOOD BY AUTOMATED COUNT: 12 % (ref 10–15)
FLUAV RNA SPEC QL NAA+PROBE: NEGATIVE
FLUBV RNA RESP QL NAA+PROBE: NEGATIVE
GLUCOSE SERPL-MCNC: 133 MG/DL (ref 70–99)
GLUCOSE UR STRIP-MCNC: NEGATIVE MG/DL
HCO3 SERPL-SCNC: 25 MMOL/L (ref 22–29)
HCT VFR BLD AUTO: 39.6 % (ref 35–47)
HGB BLD-MCNC: 13.8 G/DL (ref 11.7–15.7)
HGB UR QL STRIP: ABNORMAL
HOLD SPECIMEN: NORMAL
IMM GRANULOCYTES # BLD: 0.1 10E3/UL
IMM GRANULOCYTES NFR BLD: 1 %
KETONES UR STRIP-MCNC: NEGATIVE MG/DL
LEUKOCYTE ESTERASE UR QL STRIP: NEGATIVE
LIPASE SERPL-CCNC: 44 U/L (ref 13–60)
LYMPHOCYTES # BLD AUTO: 1.2 10E3/UL (ref 0.8–5.3)
LYMPHOCYTES NFR BLD AUTO: 19 %
MCH RBC QN AUTO: 31.2 PG (ref 26.5–33)
MCHC RBC AUTO-ENTMCNC: 34.8 G/DL (ref 31.5–36.5)
MCV RBC AUTO: 89 FL (ref 78–100)
MONOCYTES # BLD AUTO: 0.9 10E3/UL (ref 0–1.3)
MONOCYTES NFR BLD AUTO: 13 %
NEUTROPHILS # BLD AUTO: 4.3 10E3/UL (ref 1.6–8.3)
NEUTROPHILS NFR BLD AUTO: 66 %
NITRATE UR QL: NEGATIVE
NRBC # BLD AUTO: 0 10E3/UL
NRBC BLD AUTO-RTO: 0 /100
PH UR STRIP: 7.5 [PH] (ref 5–9)
PLATELET # BLD AUTO: 229 10E3/UL (ref 150–450)
POTASSIUM SERPL-SCNC: 3.7 MMOL/L (ref 3.4–5.3)
PROCALCITONIN SERPL IA-MCNC: 0.04 NG/ML
PROT SERPL-MCNC: 7.4 G/DL (ref 6.4–8.3)
RBC # BLD AUTO: 4.43 10E6/UL (ref 3.8–5.2)
RBC URINE: 1 /HPF
RSV RNA SPEC NAA+PROBE: NEGATIVE
SARS-COV-2 RNA RESP QL NAA+PROBE: NEGATIVE
SODIUM SERPL-SCNC: 137 MMOL/L (ref 135–145)
SP GR UR STRIP: 1 (ref 1–1.03)
UROBILINOGEN UR STRIP-MCNC: NORMAL MG/DL
WBC # BLD AUTO: 6.6 10E3/UL (ref 4–11)
WBC URINE: <1 /HPF

## 2025-03-20 PROCEDURE — 250N000013 HC RX MED GY IP 250 OP 250 PS 637: Performed by: FAMILY MEDICINE

## 2025-03-20 PROCEDURE — 87637 SARSCOV2&INF A&B&RSV AMP PRB: CPT | Performed by: FAMILY MEDICINE

## 2025-03-20 PROCEDURE — 71045 X-RAY EXAM CHEST 1 VIEW: CPT

## 2025-03-20 PROCEDURE — 84145 PROCALCITONIN (PCT): CPT | Performed by: FAMILY MEDICINE

## 2025-03-20 PROCEDURE — 36415 COLL VENOUS BLD VENIPUNCTURE: CPT | Performed by: FAMILY MEDICINE

## 2025-03-20 PROCEDURE — 99283 EMERGENCY DEPT VISIT LOW MDM: CPT | Performed by: FAMILY MEDICINE

## 2025-03-20 PROCEDURE — 85025 COMPLETE CBC W/AUTO DIFF WBC: CPT | Performed by: FAMILY MEDICINE

## 2025-03-20 PROCEDURE — 80053 COMPREHEN METABOLIC PANEL: CPT | Performed by: FAMILY MEDICINE

## 2025-03-20 PROCEDURE — 81001 URINALYSIS AUTO W/SCOPE: CPT | Performed by: FAMILY MEDICINE

## 2025-03-20 PROCEDURE — 99284 EMERGENCY DEPT VISIT MOD MDM: CPT | Mod: 25 | Performed by: FAMILY MEDICINE

## 2025-03-20 PROCEDURE — 87040 BLOOD CULTURE FOR BACTERIA: CPT | Performed by: FAMILY MEDICINE

## 2025-03-20 PROCEDURE — 83690 ASSAY OF LIPASE: CPT | Performed by: FAMILY MEDICINE

## 2025-03-20 RX ORDER — ACETAMINOPHEN 500 MG
1000 TABLET ORAL EVERY 6 HOURS PRN
Status: DISCONTINUED | OUTPATIENT
Start: 2025-03-20 | End: 2025-03-21 | Stop reason: HOSPADM

## 2025-03-20 RX ADMIN — ACETAMINOPHEN 1000 MG: 500 TABLET, FILM COATED ORAL at 22:20

## 2025-03-20 ASSESSMENT — ENCOUNTER SYMPTOMS
APPETITE CHANGE: 1
HEADACHES: 1
FREQUENCY: 1
VOMITING: 0
NAUSEA: 0
DYSURIA: 0
FEVER: 1
CHILLS: 1

## 2025-03-20 ASSESSMENT — ACTIVITIES OF DAILY LIVING (ADL)
ADLS_ACUITY_SCORE: 57
ADLS_ACUITY_SCORE: 57

## 2025-03-21 NOTE — DISCHARGE INSTRUCTIONS
Sherri    Your labs and chest xray were all normal.     Thank you for choosing our Emergency Department for your care.     You may receive a phone call or letter for a survey about your care in our ED.  Please complete this as this is how we improve care for our patients.     If you have any questions after leaving the ED you can call or text me on my cell phone at [phone].  I am not on call so if I do not answer my phone please leave a message- I will get back to you.  If you are not doing well please return to the ED.     Sincerely,    Dr Holden Nguyen M.D.  Medical Director  [facility] Emergency Department

## 2025-03-21 NOTE — ED PROVIDER NOTES
"  History     Chief Complaint   Patient presents with    Fever     The history is provided by the patient, the spouse and medical records.     Sherri Bennett is a 79 year old female who had a fever of 101.3  F at home. She had a septic urine infection in June 2024 without symptoms to they check her temperature every night.  She has a headache but otherwise no pain. No N/V, no dysuria.  She had decreased appetite and chills at supper time.  She has not taken any meds for this.  She has not been on antibiotics recently.      She recently stopped her diltiazem for LE edema and the edema has resolved.  She has HTN (on lisinopril, metoprolol), atrial fib (on Eliquis), mild intermittent asthma.     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       Osteopenia, unspecified location 06/09/2023     Priority: Medium    Fibrocystic breast disease 02/05/2018     Priority: Medium    Benign essential hypertension 02/05/2018     Priority: Medium    Asthma, mild intermittent, well-controlled 11/10/2016     Priority: Medium     triggers: chemicals, [place], smoke      Backache 09/05/2013     Priority: Medium    Primary osteoarthritis of first carpometacarpal joint of right hand 12/19/2012     Priority: Medium    Raynaud's syndrome 12/19/2012     Priority: Medium    Arthritis of hand 05/24/2011     Priority: Medium        Past Medical History:    Past Medical History:   Diagnosis Date    Hormone replacement therapy (postmenopausal)        Past Surgical History:    Past Surgical History:   Procedure Laterality Date    BIOPSY BREAST       2002, left breast    EXTRACAPSULAR CATARACT EXTRATION WITH INTRAOCULAR LENS IMPLANT      No Comments Provided    HYSTERECTOMY TOTAL ABDOMINAL, BILATERAL SALPINGO-OOPHORECTOMY, COMBINED      because of endometriosis    RELEASE CARPAL TUNNEL      No Comments Provided       Family History:    Family History   Problem Relation Age of Onset    Other - See Comments Father         Stroke    Heart Disease Father         Heart Disease    Unknown/Adopted Mother         Unknown    Other - See Comments Other         No history of female cancers, thyroid cancer, diabetes, no significant early heart disease    Breast Cancer No family hx of         Cancer-breast       Social History:  Marital Status:   [2]  Social History     Tobacco Use    Smoking status: Never     Passive exposure: Never    Smokeless tobacco: Never   Vaping Use    Vaping status: Never Used   Substance Use Topics    Alcohol use: Yes     Comment: 2 oz Rum per day and 1 wine per week    Drug use: Never        Medications:    apixaban ANTICOAGULANT (ELIQUIS) 5 MG tablet  baclofen (LIORESAL) 10 MG  "tablet  colchicine (COLCRYS) 0.6 MG tablet  fluticasone (FLONASE) 50 MCG/ACT nasal spray  lisinopril (ZESTRIL) 10 MG tablet  metoprolol tartrate (LOPRESSOR) 100 MG tablet  montelukast (SINGULAIR) 10 MG tablet  potassium chloride maame ER (KLOR-CON M10) 10 MEQ CR tablet  pravastatin (PRAVACHOL) 20 MG tablet  Vitamin D3 (VITAMIN D, CHOLECALCIFEROL,) 25 mcg (1000 units) tablet          Review of Systems   Constitutional:  Positive for appetite change, chills and fever.   Gastrointestinal:  Negative for nausea and vomiting.   Genitourinary:  Positive for frequency. Negative for dysuria.   Neurological:  Positive for headaches.   All other systems reviewed and are negative.      Physical Exam   BP: (!) 203/92  Pulse: 70  Temp: 99  F (37.2  C)  Resp: 16  Height: 157.5 cm (5' 2\")  Weight: 55.3 kg (122 lb)  SpO2: 98 %      Physical Exam  Vitals and nursing note reviewed.   Constitutional:       General: She is not in acute distress.     Appearance: Normal appearance. She is not ill-appearing.   Eyes:      Extraocular Movements: Extraocular movements intact.      Conjunctiva/sclera: Conjunctivae normal.   Cardiovascular:      Rate and Rhythm: Normal rate and regular rhythm.      Pulses: Normal pulses.      Heart sounds: Normal heart sounds.   Pulmonary:      Effort: Pulmonary effort is normal. No respiratory distress.      Breath sounds: Normal breath sounds. No wheezing, rhonchi or rales.   Abdominal:      General: Abdomen is flat. Bowel sounds are normal. There is no distension.      Palpations: Abdomen is soft.      Tenderness: There is no abdominal tenderness. There is no guarding.   Musculoskeletal:      Cervical back: Normal range of motion and neck supple.      Right lower leg: No edema.      Left lower leg: No edema.   Skin:     General: Skin is warm and dry.   Neurological:      General: No focal deficit present.      Mental Status: She is alert and oriented to person, place, and time.         Results for orders " placed or performed during the hospital encounter of 03/20/25 (from the past 24 hours)   UA with Microscopic reflex to Culture    Specimen: Urine, Midstream   Result Value Ref Range    Color Urine Yellow Colorless, Straw, Light Yellow, Yellow    Appearance Urine Clear Clear    Glucose Urine Negative Negative mg/dL    Bilirubin Urine Negative Negative    Ketones Urine Negative Negative mg/dL    Specific Gravity Urine 1.003 1.000 - 1.030    Blood Urine Small (A) Negative    pH Urine 7.5 5.0 - 9.0    Protein Albumin Urine Negative Negative mg/dL    Urobilinogen Urine Normal Normal, 2.0 mg/dL    Nitrite Urine Negative Negative    Leukocyte Esterase Urine Negative Negative    RBC Urine 1 <=2 /HPF    WBC Urine <1 <=5 /HPF    Narrative    Urine Culture not indicated   Influenza A/B, RSV and SARS-CoV2 PCR (COVID-19) Nose    Specimen: Nose; Swab   Result Value Ref Range    Influenza A PCR Negative Negative    Influenza B PCR Negative Negative    RSV PCR Negative Negative    SARS CoV2 PCR Negative Negative    Narrative    Testing was performed using the Xpert Xpress CoV2/Flu/RSV Assay on the [employer] GeneXpert Instrument. This test should be ordered for the detection of SARS-CoV2, influenza, and RSV viruses in individuals with signs and symptoms of respiratory tract infection. This test is for in vitro diagnostic use under the [place] FDA for laboratories certified under CLIA to perform high or moderate complexity testing. This test has been [place] FDA cleared. A negative result does not rule out the presence of PCR inhibitors in the specimen or target RNA in concentration below the limit of detection for the assay. If only one viral target is positive but coinfection with multiple targets is suspected, the sample should be re-tested with another FDA cleared, approved, or authorized test, if coninfection would change clinical management. This test was validated by the [facility]. These laboratories are certified  under the Clinical Laboratory Improvement Amendments of 1988 (CLIA-88) as qualified to perfom high complexity laboratory testing.   CBC with platelets differential    Narrative    The following orders were created for panel order CBC with platelets differential.  Procedure                               Abnormality         Status                     ---------                               -----------         ------                     CBC with platelets and ...[[id number]]                      Final result                 Please view results for these tests on the individual orders.   Comprehensive metabolic panel   Result Value Ref Range    Sodium 137 135 - 145 mmol/L    Potassium 3.7 3.4 - 5.3 mmol/L    Carbon Dioxide (CO2) 25 22 - 29 mmol/L    Anion Gap 13 7 - 15 mmol/L    Urea Nitrogen 9.0 8.0 - 23.0 mg/dL    Creatinine 0.52 0.51 - 0.95 mg/dL    GFR Estimate >90 >60 mL/min/1.73m2    Calcium 9.2 8.8 - 10.4 mg/dL    Chloride 99 98 - 107 mmol/L    Glucose 133 (H) 70 - 99 mg/dL    Alkaline Phosphatase 77 40 - 150 U/L    AST 24 0 - 45 U/L    ALT 17 0 - 50 U/L    Protein Total 7.4 6.4 - 8.3 g/dL    Albumin 4.5 3.5 - 5.2 g/dL    Bilirubin Total 0.5 <=1.2 mg/dL   Lipase   Result Value Ref Range    Lipase 44 13 - 60 U/L   Procalcitonin   Result Value Ref Range    Procalcitonin 0.04 <0.50 ng/mL   Echo Draw    Narrative    The following orders were created for panel order Echo Draw.  Procedure                               Abnormality         Status                     ---------                               -----------         ------                     Extra Blue Top Tube[[id number]]                             In process                 Extra Red Top Tube[[id number]]                              In process                 Extra Green Top (Lithiu...[[id number]]                      In process                   Please view results for these tests on the individual orders.   CBC with platelets and differential  "  Result Value Ref Range    WBC Count 6.6 4.0 - 11.0 10e3/uL    RBC Count 4.43 3.80 - 5.20 10e6/uL    Hemoglobin 13.8 11.7 - 15.7 g/dL    Hematocrit 39.6 35.0 - 47.0 %    MCV 89 78 - 100 fL    MCH 31.2 26.5 - 33.0 pg    MCHC 34.8 31.5 - 36.5 g/dL    RDW 12.0 10.0 - 15.0 %    Platelet Count 229 150 - 450 10e3/uL    % Neutrophils 66 %    % Lymphocytes 19 %    % Monocytes 13 %    % Eosinophils 0 %    % Basophils 1 %    % Immature Granulocytes 1 %    NRBCs per 100 WBC 0 <1 /100    Absolute Neutrophils 4.3 1.6 - 8.3 10e3/uL    Absolute Lymphocytes 1.2 0.8 - 5.3 10e3/uL    Absolute Monocytes 0.9 0.0 - 1.3 10e3/uL    Absolute Eosinophils 0.0 0.0 - 0.7 10e3/uL    Absolute Basophils 0.0 0.0 - 0.2 10e3/uL    Absolute Immature Granulocytes 0.1 <=0.4 10e3/uL    Absolute NRBCs 0.0 10e3/uL   XR Chest Port 1 View    Narrative    EXAM: XR CHEST PORT 1 VIEW  LOCATION: [facility] AND \[facility]\""  DATE: 3/20/2025    INDICATION: fever  COMPARISON: Chest CT from 11/07/2024.      Impression    IMPRESSION:   1.  Clear lungs.  2.  No pleural effusion or pneumothorax.  3.  Mild cardiomegaly.       Medications   acetaminophen (TYLENOL) tablet 1,000 mg (1,000 mg Oral $Given 3/20/25 2220)       Assessments & Plan (with Medical Decision Making)  Sherri Bennett is a 79 year old female who had a fever of 101.3  F at home. She had a septic urine infection in June 2024 without symptoms to they check her temperature every night.  She has a headache but otherwise no pain. No N/V, no dysuria.  She had decreased appetite and chills at supper time.  She has not taken any meds for this.  She has not been on antibiotics recently.    She recently stopped her diltiazem for LE edema and the edema has resolved.  She has HTN (on lisinopril, metoprolol), atrial fib (on Eliquis), mild intermittent asthma.   VS in the ED BP (!) 149/95   Pulse 64   Temp 99  F (37.2  C) (Tympanic)   Resp 16   Ht 1.575 m (5' 2\")   Wt 55.3 kg (122 lb)   LMP 09/12/1998 " (Approximate)   SpO2 92%   BMI 22.31 kg/m    Exam shows no concerns.  We gave Tylenol   Labs show CBC normal, CMP stable, lipase normal, PCT normal, 4 Plex negative, UA negative.  BC x 2 pending.  Chest xray stable.  10:59 PM She feels well. She has not had fever here. She feels ready to go home.        I have reviewed the nursing notes.    I have reviewed the findings, diagnosis, plan and need for follow up with the patient.  Medical Decision Making  The patient's presentation was of low complexity (an acute and uncomplicated illness or injury).    The patient's evaluation involved:  an assessment requiring an independent historian (see separate area of note for details)  ordering and/or review of 3+ test(s) in this encounter (see separate area of note for details)    The patient's management necessitated only low risk treatment.      Final diagnoses:   Fever, unspecified fever cause - fever at home, none in ED       3/20/2025   [facility]  03/20/25 [id number]

## 2025-03-21 NOTE — ED TRIAGE NOTES
Pt presents with a fever tonight.  Feeling general malaise for the past couple days, decreased appetite at dinner.  Pt has hx of sepsis with pyelonephritis  with no symptoms, pt reports urinary  frequency, and wanted to be tested.  BP in triage 203/92, recently stopped diltiazem, hx of stroke in 2024.

## 2025-03-25 LAB
BACTERIA BLD CULT: NO GROWTH
BACTERIA BLD CULT: NO GROWTH

## 2025-04-01 ENCOUNTER — MYC MEDICAL ADVICE (OUTPATIENT)
Dept: INTERNAL MEDICINE | Facility: OTHER | Age: 80
End: 2025-04-01
Payer: COMMERCIAL

## 2025-04-01 NOTE — TELEPHONE ENCOUNTER
Update since 3/18 OV:  Stopped Diltiazem. LE swelling improved and Pulse is 50-68.    Turmeric Curcumin capsules helped with knee/back/hand pain.     3/20 went to ED for Fever, elevated b/p.     Nehemiah Berger RN on 4/1/2025 at 3:44 PM

## 2025-04-02 NOTE — TELEPHONE ENCOUNTER
Continue to monitor- reviewed ER labwork which is reassuring. If anything new comes up- let us know. Glad her pain is improving.

## 2025-05-12 ENCOUNTER — TELEPHONE (OUTPATIENT)
Dept: PHARMACY | Facility: OTHER | Age: 80
End: 2025-05-12
Payer: COMMERCIAL

## 2025-05-12 NOTE — TELEPHONE ENCOUNTER
[facility] Recruitment: [facility]  insurance     Referral outreach attempt #1 on May 12, 2025      Outcome: left voicemail- Call back number [phone] and MyChart message sent    [facility]

## 2025-05-17 ENCOUNTER — MYC MEDICAL ADVICE (OUTPATIENT)
Dept: INTERNAL MEDICINE | Facility: OTHER | Age: 80
End: 2025-05-17
Payer: COMMERCIAL

## 2025-05-17 DIAGNOSIS — I10 BENIGN ESSENTIAL HYPERTENSION: ICD-10-CM

## 2025-05-19 ENCOUNTER — TELEPHONE (OUTPATIENT)
Dept: PHARMACY | Facility: OTHER | Age: 80
End: 2025-05-19
Payer: COMMERCIAL

## 2025-05-19 RX ORDER — LISINOPRIL 20 MG/1
20 TABLET ORAL 2 TIMES DAILY
Qty: 180 TABLET | Refills: 4 | Status: SHIPPED | OUTPATIENT
Start: 2025-05-19

## 2025-05-19 NOTE — TELEPHONE ENCOUNTER
[facility] Recruitment: [facility]  insurance     Referral outreach attempt #2 on May 19, 2025      Outcome: left voicemail- Call back number [phone] and MyChart message sent    [facility]

## 2025-05-19 NOTE — TELEPHONE ENCOUNTER
Current resting HR ranges from 50-62. Has not increased Metoprolol. Still taking 50 mg BID. HR did not increase since stopping Diltiazem. B/P is high and has swelling in her legs. Two days ago b/p was 142/83    Lisinopril was decreased from 40 mg daily to 10 mg daily when in the hospital. Wondering if dose can be increased back up?  Do you recommend she take the time for the Medication Therapy Management program recommended by Medicare?     Per OV  3/18/25:  Blood pressure is better.... but pulse and leg swelling are still off.      -- Stop Diltiazem completely....      -- Consider Tumeric - for joint pain.       Start Colchicine -- once daily for joint pain.      If needed, can increase to 2x daily... but may need to watch for diarrhea, and would need to reduce dose of your pravastatin.       Continue Metoprolol at 50 mg twice daily for now.....   -- If heart rate increases and/or you get heart palpitations.... may need to increase the metoprolol up to 50 mg 3 times daily or even possibly 100 mg twice daily.    Routing to provider to review and respond.  Nehemiah Berger RN on 5/19/2025 at 11:00 AM

## 2025-06-09 ENCOUNTER — HOSPITAL ENCOUNTER (OUTPATIENT)
Dept: MAMMOGRAPHY | Facility: OTHER | Age: 80
Discharge: HOME | End: 2025-06-09
Attending: INTERNAL MEDICINE | Admitting: INTERNAL MEDICINE
Payer: MEDICARE

## 2025-06-09 DIAGNOSIS — Z12.31 ENCOUNTER FOR SCREENING MAMMOGRAM FOR BREAST CANCER: ICD-10-CM

## 2025-06-09 PROCEDURE — 77063 BREAST TOMOSYNTHESIS BI: CPT | Mod: 26 | Performed by: RADIOLOGY

## 2025-06-09 PROCEDURE — 77067 SCR MAMMO BI INCL CAD: CPT

## 2025-06-09 PROCEDURE — 77067 SCR MAMMO BI INCL CAD: CPT | Mod: 26 | Performed by: RADIOLOGY

## 2025-06-12 ENCOUNTER — VIRTUAL VISIT (OUTPATIENT)
Dept: PHARMACY | Facility: CLINIC | Age: 80
End: 2025-06-12
Payer: COMMERCIAL

## 2025-06-12 DIAGNOSIS — M25.561 CHRONIC PAIN OF BOTH KNEES: ICD-10-CM

## 2025-06-12 DIAGNOSIS — J45.20 ASTHMA, MILD INTERMITTENT, WELL-CONTROLLED: ICD-10-CM

## 2025-06-12 DIAGNOSIS — Z78.9 TAKES DIETARY SUPPLEMENTS: ICD-10-CM

## 2025-06-12 DIAGNOSIS — G47.00 INSOMNIA, UNSPECIFIED TYPE: ICD-10-CM

## 2025-06-12 DIAGNOSIS — M25.562 CHRONIC PAIN OF BOTH KNEES: ICD-10-CM

## 2025-06-12 DIAGNOSIS — D68.69 HYPERCOAGULABLE STATE DUE TO PAROXYSMAL ATRIAL FIBRILLATION (H): ICD-10-CM

## 2025-06-12 DIAGNOSIS — G89.29 CHRONIC PAIN OF BOTH KNEES: ICD-10-CM

## 2025-06-12 DIAGNOSIS — J30.2 SEASONAL ALLERGIC RHINITIS, UNSPECIFIED TRIGGER: ICD-10-CM

## 2025-06-12 DIAGNOSIS — I69.898 HISTORY OF LATERALIZING MOTOR DEFICIT FOLLOWING CEREBROVASCULAR ACCIDENT (CVA): ICD-10-CM

## 2025-06-12 DIAGNOSIS — I48.0 HYPERCOAGULABLE STATE DUE TO PAROXYSMAL ATRIAL FIBRILLATION (H): ICD-10-CM

## 2025-06-12 DIAGNOSIS — I10 BENIGN ESSENTIAL HYPERTENSION: Primary | ICD-10-CM

## 2025-06-12 DIAGNOSIS — E78.2 MIXED HYPERLIPIDEMIA: ICD-10-CM

## 2025-06-12 RX ORDER — METOPROLOL TARTRATE 50 MG
50 TABLET ORAL 2 TIMES DAILY
Qty: 180 TABLET | Refills: 1 | Status: CANCELLED | OUTPATIENT
Start: 2025-06-12

## 2025-06-12 RX ORDER — MULTIVITAMIN WITH IRON
1 TABLET ORAL DAILY
COMMUNITY

## 2025-06-12 RX ORDER — CHOLECALCIFEROL (VITAMIN D3) 50 MCG
1 TABLET ORAL 2 TIMES DAILY
COMMUNITY

## 2025-06-12 RX ORDER — ALBUTEROL SULFATE 90 UG/1
2 INHALANT RESPIRATORY (INHALATION) EVERY 6 HOURS PRN
COMMUNITY

## 2025-06-12 RX ORDER — AMLODIPINE BESYLATE 2.5 MG/1
2.5 TABLET ORAL DAILY
Qty: 90 TABLET | Refills: 1 | Status: CANCELLED | OUTPATIENT
Start: 2025-06-12

## 2025-06-12 NOTE — LETTER
_  Medication List        Prepared on: Jun 12, 2025     Bring your Medication List when you go to the doctor, hospital, or   emergency room. And, share it with your family or caregivers.     Note any changes to how you take your medications.  Cross out medications when you no longer use them.    Medication How I take it Why I use it Prescriber   albuterol (PROAIR HFA/PROVENTIL HFA/VENTOLIN HFA) 108 (90 Base) MCG/ACT inhaler Inhale 2 puffs into the lungs every 6 hours as needed for shortness of breath, wheezing or cough.  Asthma Patient Reported   apixaban ANTICOAGULANT (ELIQUIS) 5 MG tablet Take 1 tablet (5 mg) by mouth 2 times daily. 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 Chronic Neck Pain Leonides Scott MD   fluticasone (FLONASE) 50 MCG/ACT nasal spray Spray 1 spray into both nostrils daily  Allergies Entered By History Unknown   HEMP OIL OR EXTRACT OR OTHER CBD CANNABINOID, NOT MEDICAL CANNABIS, Take 10 mg by mouth at bedtime.  Insomnia Patient Reported   lisinopril (ZESTRIL) 20 MG tablet Take 1 tablet (20 mg) by mouth 2 times daily. Benign Essential Hypertension Leonides Scott MD   magnesium 250 MG tablet Take 1 tablet by mouth daily.  General Health Patient Reported   metoprolol tartrate (LOPRESSOR) 100 MG tablet Take 0.5-1 tablets ( mg) by mouth 2 times daily. Hypercoagulable state due to paroxysmal atrial fibrillation (H) Leonides Scott MD   [facility] Natural Products (TURMERIC, CURCUMIN, PO) Take 1,500 mg by mouth daily.  Pain Patient Reported   montelukast (SINGULAIR) 10 MG tablet Take 1 tablet (10 mg) by mouth at bedtime. Environmental Allergies Leonides Scott MD   potassium chloride maame ER (KLOR-CON M10) 10 MEQ CR tablet Take 1 tablet (10 mEq) by mouth daily. Hypokalemia Leonides  MD Tyler   pravastatin (PRAVACHOL) 20 MG tablet Take 1 tablet (20 mg) by mouth daily. 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  General Health Patient Reported         Add new medications, over-the-counter drugs, herbals, vitamins, or  minerals in the blank rows below.    Medication How I take it Why I use it Prescriber                                      Allergies:      - Milk Protein - GI Disturbance  - Amoxicillin-pot Clavulanate - Diarrhea, GI Disturbance  - Aspirin  - Hydrochlorothiazide  - Milk (cow) - GI Disturbance        Side effects I have had:      Not on File        Other Information:              My notes and questions:

## 2025-06-12 NOTE — PATIENT INSTRUCTIONS
"Recommendations from today's MTM visit:                                                    MTM (medication therapy management) is a service provided by a clinical pharmacist designed to help you get the most of out of your medicines.   Today we reviewed what your medicines are for, how to know if they are working, that your medicines are safe and how to make your medicine regimen as easy as possible.      I will check with Dr. Scott about switching your metoprolol to a 50 mg tablet so you can just take a full tablet every day  I will check with Dr. Scott about having you start amlodipine 2.5 mg daily  I'll check with Dr. Scott about putting in an order for a cholesterol check - you can get this done with your labs that are scheduled in July. This will be a fasting lab.    Follow-up: phone call 7/3 at 8:30AM    It was great speaking with you today.  I value your experience and would be very thankful for your time in providing feedback in our clinic survey. In the next few days, you may receive an email or text message from [employer] with a link to a survey related to your  clinical pharmacist.\"     To schedule another MTM appointment, please call the clinic directly or you may call the MTM scheduling line at [phone].    My Clinical Pharmacist's contact information:                                                      Please feel free to contact me with any questions or concerns you have.      Elin Rios, PharmD  Medication Therapy Management Pharmacy Resident  [phone]   "

## 2025-06-12 NOTE — LETTER
June 12, 2025  Sherri Bennett  [address]   [place] [facility] [address]    Dear Ms. Bennett, [facility]     Thank you for talking with me on Jun 12, 2025 about your health and medications. As a follow-up to our conversation, I have included two documents:      Your Recommended To-Do List has steps you should take to get the best results from your medications.  Your Medication List will help you keep track of your medications and how to take them.    If you want to talk about these documents, please call Elin Rios RPH at phone: [phone], Monday-Friday 8-4:30pm.    I look forward to working with you and your doctors to make sure your medications work well for you.    Sincerely,  Elin Rios RPH  [facility] Pharmacist, [facility]

## 2025-06-12 NOTE — LETTER
"Recommended To-Do List      Prepared on: Jun 12, 2025       You can get the best results from your medications by completing the items on this \"To-Do List.\"      Bring your To-Do List when you go to your doctor. And, share it with your family or caregivers.    My To-Do List:  What we talked about: What I should do:   A new medication that may be of benefit to you    I will talk with Dr. Scott about having you start taking amLODIPine (NORVASC)          What we talked about: What I should do:   A medication that is not working    I will talk with Dr. Scott about having you switch the strength of metoprolol tartrate (LOPRESSOR) tablets to 50 mg          What we talked about: What I should do:                     "

## 2025-06-12 NOTE — PROGRESS NOTES
Medication Therapy Management (MTM) Encounter    ASSESSMENT:                            Medication Adherence/Access: No issues identified.    Hypertension /Afib/H/o CVA  Blood pressure not at goal <130/80 mmHg. Would not recommend increasing metoprolol further due to heart rate running on the lower side. Since patient previously tolerated amlodipine well at low doses, would be reasonable to retry this to help lower blood pressure further. Would not recommend using an amlodipine dose higher than 5 mg for this patient and will start low with slow titration if needed.   Patient may benefit from changing to 50 mg tablets of metoprolol for convenience of not having to split tablets.    Hyperlipidemia   Last LDL at goal <70 mg/dL, due for recheck at the end of this month.     Allergy   Stable.    Asthma   Stable. If inhaler use becomes more frequent, could consider switching to ICS/formoterol rather than albuterol to align with current ROBINA guidelines.     Supplements   Tumeric can increase bleeding risk, would be cautious to monitor for signs of bleeding while taking with Eliquis.     Pain   Stable.    Insomnia   Stable.     PLAN:                            I will check with Dr. Scott about switching your metoprolol to a 50 mg tablet so you can just take a full tablet every day  I will check with Dr. Scott about having you start amlodipine 2.5 mg daily  I'll check with Dr. Scott about putting in an order for a cholesterol check - you can get this done with your labs that are scheduled in July. This will be a fasting lab.    Follow-up: phone call 7/3 at 8:30AM    SUBJECTIVE/OBJECTIVE:                          Sherri Bennett is a 79 year old female called for an initial visit. She was referred to me from her healthcare plan.      Reason for visit: comprehensive medication review.    Allergies/ADRs: Reviewed in chart  Past Medical History: Reviewed in chart  Tobacco: She reports that she has never smoked. She has never  been exposed to tobacco smoke. She has never used smokeless tobacco.  Alcohol: 1.5 ounces per day  Caffeine: a few cups of coffee every morning  Medication Adherence/Access: Patient takes medications directly from bottles and has assistance with medication administration: .  Patient takes medications 2 time(s) per day.   Per patient, misses medication 0 time(s) per week.     Hypertension /Afib/H/o CVA  Eliquis 5 mg twice daily  Lisinopril 20 mg twice daily  Metoprolol tartrate 50 mg twice daily - reports she has the 100 mg tablets at home because they were considering increasing her dose if needed. Would prefer not to have to split tablets.  Patient reports did have a nose bleed this week, lasted ~10 minutes. This is rare.  Patient self monitors blood pressure.  Home BP monitoring 142/72 mmHg. HR 54 BPM. Reports the highest HR she's seen recently has been 70 BPM and this is when she was up and moving around.    Previously was taking diltiazem - stopped due to edema and bradycardia. Edema has mainly resolved, some residual swelling that patient is treating by wearing compression socks.  Previously on amlodipine - tolerated 5 mg dose well but got dizzy when increased to 10 mg. No edema occurred while taking this.     Hyperlipidemia   Pravastatin 20 mg daily  Patient reports no significant myalgias or other side effects.     Allergy   Flonase (fluticasone) nasal spray - 1 spray(s) each nostril once daily  Patient reports no current medication side effects.    Asthma triggers: smoke  Patient feels that current therapy is effective.     Asthma   Montelukast 10 mg at bedtime  Albuterol inhaler as needed - has not needed recently  Patient reports the following medication side effects: when she uses albuterol will get a headache after, but she is not too concerned about this since she doesn't really need it anymore.    Patient reports the following symptoms: none.     Supplements   Potassium chloride 10 mEq  daily  Vitamin D 2000 units twice daily  Magnesium 250 mg daily  No reported issues at this time.      Pain   Baclofen 10 mg twice daily as needed - used one half tablet this week  Turmeric 1500 mg daily  Pain type/location: baclofen used for neck pain, turmeric used for inflammation/pain in her knees. Reports both of these are helpful.  Patient reports the following side effects: no medication side effects     Insomnia   CBN cannabinoid 10 mg chewable tablets at bedtime  Patient reports this works well, no concerns.        Today's Vitals: LMP 09/12/1998 (Approximate)   ----------------      I spent 30 minutes with this patient today. All changes were made via collaborative practice agreement with Leonides Scott MD. A copy of the visit note was provided to the patient's provider(s).    A summary of these recommendations was sent via [employer].    Elin Rios PharmD  Medication Therapy Management Pharmacy Resident  [phone]    Preceptor cosignature: Sherri Bennett was seen independently by Dr. Rios. I have reviewed the assessment and plan. Adela Richardson PharmD, JEFFERY, BCACP    Telemedicine Visit Details  The patient's medications can be safely assessed via a telemedicine encounter.  Type of service:  Telephone visit  Originating Location (pt. Location): Home    Distant Location (provider location):  On-site  Start Time: 9:31 AM  End Time: 10:01 AM    Preceptor cosignature: Sherri Bennett was seen independently by Dr. Rios. I have reviewed the assessment and plan. Adela Richardson PharmD, JEFFERY, BCACP       Medication Therapy Recommendations  Benign essential hypertension   1 Current Medication: lisinopril (ZESTRIL) 20 MG tablet   Current Medication Sig: Take 1 tablet (20 mg) by mouth 2 times daily.   Rationale: Synergistic therapy - Needs additional medication therapy - Indication   Recommendation: Start Medication - amLODIPine 2.5 MG tablet   Status: Contact Provider - Awaiting Response   Identified  Date: 6/12/2025         2 Current Medication: metoprolol tartrate (LOPRESSOR) 100 MG tablet   Current Medication Sig: Take 0.5-1 tablets ( mg) by mouth 2 times daily.   Rationale: Dosage form inappropriate - Ineffective medication - Effectiveness   Recommendation: Change Medication Formulation  - metoprolol tartrate 50 MG tablet   Status: Contact Provider - Awaiting Response   Identified Date: 6/12/2025

## 2025-06-12 NOTE — Clinical Note
FYI - saw mutual patient for an MTM visit per insurance's request.  Elin Rios, PharmD Medication Therapy Management Pharmacy Resident

## 2025-07-01 ENCOUNTER — HOSPITAL ENCOUNTER (OUTPATIENT)
Dept: MRI IMAGING | Facility: OTHER | Age: 80
Discharge: HOME | End: 2025-07-01
Attending: PSYCHIATRY & NEUROLOGY
Payer: MEDICARE

## 2025-07-01 DIAGNOSIS — D32.9 MENINGIOMA (H): ICD-10-CM

## 2025-07-01 PROCEDURE — 255N000002 HC RX 255 OP 636: Performed by: PSYCHIATRY & NEUROLOGY

## 2025-07-01 PROCEDURE — 70553 MRI BRAIN STEM W/O & W/DYE: CPT

## 2025-07-01 PROCEDURE — 70553 MRI BRAIN STEM W/O & W/DYE: CPT | Mod: 26 | Performed by: RADIOLOGY

## 2025-07-01 PROCEDURE — A9575 INJ GADOTERATE MEGLUMI 0.1ML: HCPCS | Performed by: PSYCHIATRY & NEUROLOGY

## 2025-07-01 RX ORDER — GADOTERATE MEGLUMINE 376.9 MG/ML
15 INJECTION INTRAVENOUS ONCE
Status: COMPLETED | OUTPATIENT
Start: 2025-07-01 | End: 2025-07-01

## 2025-07-01 RX ADMIN — GADOTERATE MEGLUMINE 11 ML: 376.9 INJECTION INTRAVENOUS at 11:30

## 2025-07-03 ENCOUNTER — TELEPHONE (OUTPATIENT)
Dept: PHARMACY | Facility: CLINIC | Age: 80
End: 2025-07-03

## 2025-07-03 ENCOUNTER — RESULTS FOLLOW-UP (OUTPATIENT)
Dept: NEUROLOGY | Facility: OTHER | Age: 80
End: 2025-07-03

## 2025-07-03 NOTE — TELEPHONE ENCOUNTER
Called patient regarding appointment that was scheduled this morning. She has not made any changes to medications since I have not yet gotten the okay from her PCP to do so, therefore appointment today was not needed. Will update patient via [employer] when I hear back from PCP and if changes are approved will schedule follow up visit for ~3 weeks later.    She reports there has not been much change in her blood pressures since our last visit.    Elin Rios, PharmD  Medication Therapy Management Pharmacist  [facility]  [phone]

## 2025-07-14 DIAGNOSIS — I10 BENIGN ESSENTIAL HYPERTENSION: Primary | ICD-10-CM

## 2025-07-14 DIAGNOSIS — E78.2 MIXED HYPERLIPIDEMIA: ICD-10-CM

## 2025-07-14 RX ORDER — AMLODIPINE BESYLATE 2.5 MG/1
2.5 TABLET ORAL DAILY
Qty: 30 TABLET | Refills: 2 | Status: SHIPPED | OUTPATIENT
Start: 2025-07-14

## 2025-07-14 RX ORDER — METOPROLOL TARTRATE 50 MG
50 TABLET ORAL 2 TIMES DAILY
Qty: 180 TABLET | Refills: 3 | Status: SHIPPED | OUTPATIENT
Start: 2025-07-14

## 2025-07-14 NOTE — TELEPHONE ENCOUNTER
Pending orders and routing to PCP per recommendations in 6/12 MTM visit.    Elin Rios, PharmD  Medication Therapy Management Pharmacist  [facility]  [phone]

## 2025-07-31 ENCOUNTER — LAB (OUTPATIENT)
Dept: LAB | Facility: OTHER | Age: 80
End: 2025-07-31
Attending: INTERNAL MEDICINE
Payer: MEDICARE

## 2025-07-31 DIAGNOSIS — E78.5 DYSLIPIDEMIA: ICD-10-CM

## 2025-07-31 DIAGNOSIS — I48.91 ATRIAL FIBRILLATION WITH RAPID VENTRICULAR RESPONSE (H): ICD-10-CM

## 2025-07-31 LAB
ANION GAP SERPL CALCULATED.3IONS-SCNC: 11 MMOL/L (ref 7–15)
BUN SERPL-MCNC: 11.5 MG/DL (ref 8–23)
CALCIUM SERPL-MCNC: 9.6 MG/DL (ref 8.8–10.4)
CHLORIDE SERPL-SCNC: 101 MMOL/L (ref 98–107)
CHOLEST SERPL-MCNC: 178 MG/DL
CREAT SERPL-MCNC: 0.61 MG/DL (ref 0.51–0.95)
EGFRCR SERPLBLD CKD-EPI 2021: >90 ML/MIN/1.73M2
ERYTHROCYTE [DISTWIDTH] IN BLOOD BY AUTOMATED COUNT: 12 % (ref 10–15)
FASTING STATUS PATIENT QL REPORTED: YES
FASTING STATUS PATIENT QL REPORTED: YES
GLUCOSE SERPL-MCNC: 113 MG/DL (ref 70–99)
HCO3 SERPL-SCNC: 29 MMOL/L (ref 22–29)
HCT VFR BLD AUTO: 45.7 % (ref 35–47)
HDLC SERPL-MCNC: 66 MG/DL
HGB BLD-MCNC: 15.4 G/DL (ref 11.7–15.7)
LDLC SERPL CALC-MCNC: 93 MG/DL
MCH RBC QN AUTO: 31.1 PG (ref 26.5–33)
MCHC RBC AUTO-ENTMCNC: 33.7 G/DL (ref 31.5–36.5)
MCV RBC AUTO: 92 FL (ref 78–100)
NONHDLC SERPL-MCNC: 112 MG/DL
PLATELET # BLD AUTO: 249 10E3/UL (ref 150–450)
POTASSIUM SERPL-SCNC: 3.7 MMOL/L (ref 3.4–5.3)
RBC # BLD AUTO: 4.95 10E6/UL (ref 3.8–5.2)
SODIUM SERPL-SCNC: 141 MMOL/L (ref 135–145)
TRIGL SERPL-MCNC: 97 MG/DL
WBC # BLD AUTO: 4.4 10E3/UL (ref 4–11)

## 2025-07-31 PROCEDURE — 36415 COLL VENOUS BLD VENIPUNCTURE: CPT | Mod: ZL

## 2025-07-31 PROCEDURE — 85027 COMPLETE CBC AUTOMATED: CPT | Mod: ZL

## 2025-07-31 PROCEDURE — 80061 LIPID PANEL: CPT | Mod: ZL

## 2025-07-31 PROCEDURE — 82374 ASSAY BLOOD CARBON DIOXIDE: CPT | Mod: ZL

## 2025-07-31 PROCEDURE — 80048 BASIC METABOLIC PNL TOTAL CA: CPT | Mod: ZL

## 2025-08-07 ENCOUNTER — VIRTUAL VISIT (OUTPATIENT)
Dept: CARDIOLOGY | Facility: OTHER | Age: 80
End: 2025-08-07
Attending: INTERNAL MEDICINE
Payer: MEDICARE

## 2025-08-07 ENCOUNTER — VIRTUAL VISIT (OUTPATIENT)
Dept: PHARMACY | Facility: CLINIC | Age: 80
End: 2025-08-07
Payer: COMMERCIAL

## 2025-08-07 VITALS
BODY MASS INDEX: 22.31 KG/M2 | SYSTOLIC BLOOD PRESSURE: 139 MMHG | DIASTOLIC BLOOD PRESSURE: 77 MMHG | WEIGHT: 122 LBS | HEART RATE: 53 BPM

## 2025-08-07 DIAGNOSIS — I10 BENIGN ESSENTIAL HYPERTENSION: Primary | ICD-10-CM

## 2025-08-07 DIAGNOSIS — E78.2 MIXED HYPERLIPIDEMIA: ICD-10-CM

## 2025-08-07 DIAGNOSIS — I48.0 HYPERCOAGULABLE STATE DUE TO PAROXYSMAL ATRIAL FIBRILLATION (H): ICD-10-CM

## 2025-08-07 DIAGNOSIS — D68.69 HYPERCOAGULABLE STATE DUE TO PAROXYSMAL ATRIAL FIBRILLATION (H): ICD-10-CM

## 2025-08-07 DIAGNOSIS — I69.898 HISTORY OF LATERALIZING MOTOR DEFICIT FOLLOWING CEREBROVASCULAR ACCIDENT (CVA): ICD-10-CM

## 2025-08-07 RX ORDER — PRAVASTATIN SODIUM 20 MG
40 TABLET ORAL DAILY
Qty: 180 TABLET | Refills: 3 | Status: SHIPPED | OUTPATIENT
Start: 2025-08-07

## 2025-08-07 ASSESSMENT — PAIN SCALES - GENERAL: PAINLEVEL_OUTOF10: MODERATE PAIN (5)

## (undated) RX ORDER — DILTIAZEM HYDROCHLORIDE 5 MG/ML
INJECTION INTRAVENOUS
Status: DISPENSED
Start: 2024-06-21

## (undated) RX ORDER — LIDOCAINE HYDROCHLORIDE 10 MG/ML
INJECTION, SOLUTION INFILTRATION; PERINEURAL
Status: DISPENSED
Start: 2024-10-21

## (undated) RX ORDER — CALCIUM GLUCONATE 94 MG/ML
INJECTION, SOLUTION INTRAVENOUS
Status: DISPENSED
Start: 2024-06-18

## (undated) RX ORDER — DILTIAZEM HYDROCHLORIDE 5 MG/ML
INJECTION INTRAVENOUS
Status: DISPENSED
Start: 2024-06-18

## (undated) RX ORDER — METOPROLOL TARTRATE 1 MG/ML
INJECTION, SOLUTION INTRAVENOUS
Status: DISPENSED
Start: 2024-06-21

## (undated) RX ORDER — MAGNESIUM SULFATE HEPTAHYDRATE 40 MG/ML
INJECTION, SOLUTION INTRAVENOUS
Status: DISPENSED
Start: 2024-06-18

## (undated) RX ORDER — TRIAMCINOLONE ACETONIDE 40 MG/ML
INJECTION, SUSPENSION INTRA-ARTICULAR; INTRAMUSCULAR
Status: DISPENSED
Start: 2024-10-21

## (undated) RX ORDER — CEFTRIAXONE SODIUM 1 G
VIAL (EA) INJECTION
Status: DISPENSED
Start: 2024-06-18

## (undated) RX ORDER — NITROGLYCERIN 20 MG/100ML
INJECTION INTRAVENOUS
Status: DISPENSED
Start: 2024-06-19

## (undated) RX ORDER — DILTIAZEM HYDROCHLORIDE 5 MG/ML
INJECTION INTRAVENOUS
Status: DISPENSED
Start: 2024-06-23

## (undated) RX ORDER — CALCIUM CHLORIDE 100 MG/ML
INJECTION INTRAVENOUS; INTRAVENTRICULAR
Status: DISPENSED
Start: 2024-06-18

## (undated) RX ORDER — DILTIAZEM HCL/D5W 125 MG/125
PLASTIC BAG, INJECTION (ML) INTRAVENOUS
Status: DISPENSED
Start: 2024-06-18

## (undated) RX ORDER — POTASSIUM CHLORIDE 7.45 MG/ML
INJECTION INTRAVENOUS
Status: DISPENSED
Start: 2024-06-18

## (undated) RX ORDER — ONDANSETRON 2 MG/ML
INJECTION INTRAMUSCULAR; INTRAVENOUS
Status: DISPENSED
Start: 2024-06-18

## (undated) RX ORDER — FUROSEMIDE 10 MG/ML
INJECTION INTRAMUSCULAR; INTRAVENOUS
Status: DISPENSED
Start: 2024-06-19

## (undated) RX ORDER — ACETAMINOPHEN 500 MG
TABLET ORAL
Status: DISPENSED
Start: 2025-03-20

## (undated) RX ORDER — KETOROLAC TROMETHAMINE 15 MG/ML
INJECTION, SOLUTION INTRAMUSCULAR; INTRAVENOUS
Status: DISPENSED
Start: 2024-06-18